# Patient Record
Sex: FEMALE | Race: WHITE | Employment: UNEMPLOYED | URBAN - METROPOLITAN AREA
[De-identification: names, ages, dates, MRNs, and addresses within clinical notes are randomized per-mention and may not be internally consistent; named-entity substitution may affect disease eponyms.]

---

## 2022-11-21 PROCEDURE — 96365 THER/PROPH/DIAG IV INF INIT: CPT

## 2022-11-21 PROCEDURE — 99285 EMERGENCY DEPT VISIT HI MDM: CPT

## 2022-11-22 ENCOUNTER — HOSPITAL ENCOUNTER (INPATIENT)
Age: 52
LOS: 1 days | Discharge: LEFT BEFORE DISCHARGE | DRG: 380 | End: 2022-11-22
Attending: INTERNAL MEDICINE | Admitting: INTERNAL MEDICINE
Payer: COMMERCIAL

## 2022-11-22 ENCOUNTER — APPOINTMENT (OUTPATIENT)
Dept: GENERAL RADIOLOGY | Age: 52
DRG: 380 | End: 2022-11-22
Payer: COMMERCIAL

## 2022-11-22 ENCOUNTER — APPOINTMENT (OUTPATIENT)
Dept: CT IMAGING | Age: 52
DRG: 380 | End: 2022-11-22
Payer: COMMERCIAL

## 2022-11-22 ENCOUNTER — HOSPITAL ENCOUNTER (EMERGENCY)
Age: 52
Discharge: ANOTHER HOSPITAL | DRG: 380 | End: 2022-11-22
Attending: EMERGENCY MEDICINE
Payer: COMMERCIAL

## 2022-11-22 ENCOUNTER — HOSPITAL ENCOUNTER (INPATIENT)
Age: 52
LOS: 1 days | Discharge: HOME | DRG: 380 | End: 2022-11-23
Attending: INTERNAL MEDICINE | Admitting: INTERNAL MEDICINE
Payer: COMMERCIAL

## 2022-11-22 VITALS
HEIGHT: 63 IN | TEMPERATURE: 98 F | SYSTOLIC BLOOD PRESSURE: 139 MMHG | RESPIRATION RATE: 18 BRPM | DIASTOLIC BLOOD PRESSURE: 66 MMHG | WEIGHT: 198 LBS | BODY MASS INDEX: 35.08 KG/M2 | HEART RATE: 75 BPM | OXYGEN SATURATION: 90 %

## 2022-11-22 VITALS
RESPIRATION RATE: 18 BRPM | DIASTOLIC BLOOD PRESSURE: 70 MMHG | TEMPERATURE: 98.5 F | OXYGEN SATURATION: 92 % | HEART RATE: 94 BPM | SYSTOLIC BLOOD PRESSURE: 168 MMHG

## 2022-11-22 DIAGNOSIS — L03.319 CELLULITIS OF TRUNK, UNSPECIFIED SITE OF TRUNK: ICD-10-CM

## 2022-11-22 DIAGNOSIS — L89.319 PRESSURE INJURY OF SKIN OF RIGHT BUTTOCK, UNSPECIFIED INJURY STAGE: Primary | ICD-10-CM

## 2022-11-22 DIAGNOSIS — L89.329 PRESSURE INJURY OF SKIN OF LEFT BUTTOCK, UNSPECIFIED INJURY STAGE: Primary | ICD-10-CM

## 2022-11-22 PROBLEM — L03.317 CELLULITIS AND ABSCESS OF BUTTOCK: Status: ACTIVE | Noted: 2022-11-22

## 2022-11-22 PROBLEM — L03.317 CELLULITIS OF BUTTOCK: Status: ACTIVE | Noted: 2022-11-22

## 2022-11-22 PROBLEM — L02.31 CELLULITIS AND ABSCESS OF BUTTOCK: Status: ACTIVE | Noted: 2022-11-22

## 2022-11-22 PROBLEM — L89.309 DECUBITUS ULCER, BUTTOCK: Status: ACTIVE | Noted: 2022-11-22

## 2022-11-22 LAB
A/G RATIO: 0.9 (ref 1.1–2.2)
A/G RATIO: 0.9 (ref 1.1–2.2)
ALBUMIN SERPL-MCNC: 3.3 G/DL (ref 3.4–5)
ALBUMIN SERPL-MCNC: 3.7 G/DL (ref 3.4–5)
ALP BLD-CCNC: 119 U/L (ref 40–129)
ALP BLD-CCNC: 97 U/L (ref 40–129)
ALT SERPL-CCNC: 12 U/L (ref 10–40)
ALT SERPL-CCNC: 8 U/L (ref 10–40)
ANION GAP SERPL CALCULATED.3IONS-SCNC: 7 MMOL/L (ref 3–16)
ANION GAP SERPL CALCULATED.3IONS-SCNC: 8 MMOL/L (ref 3–16)
AST SERPL-CCNC: 15 U/L (ref 15–37)
AST SERPL-CCNC: 16 U/L (ref 15–37)
BACTERIA: ABNORMAL /HPF
BASOPHILS ABSOLUTE: 0 K/UL (ref 0–0.2)
BASOPHILS ABSOLUTE: 0.1 K/UL (ref 0–0.2)
BASOPHILS RELATIVE PERCENT: 0.3 %
BASOPHILS RELATIVE PERCENT: 1.3 %
BILIRUB SERPL-MCNC: 0.8 MG/DL (ref 0–1)
BILIRUB SERPL-MCNC: 1 MG/DL (ref 0–1)
BILIRUBIN URINE: NEGATIVE
BLOOD, URINE: NEGATIVE
BUN BLDV-MCNC: 18 MG/DL (ref 7–20)
BUN BLDV-MCNC: 23 MG/DL (ref 7–20)
CALCIUM SERPL-MCNC: 9 MG/DL (ref 8.3–10.6)
CALCIUM SERPL-MCNC: 9.4 MG/DL (ref 8.3–10.6)
CHLORIDE BLD-SCNC: 94 MMOL/L (ref 99–110)
CHLORIDE BLD-SCNC: 97 MMOL/L (ref 99–110)
CLARITY: CLEAR
CO2: 33 MMOL/L (ref 21–32)
CO2: 36 MMOL/L (ref 21–32)
COLOR: YELLOW
CREAT SERPL-MCNC: 0.7 MG/DL (ref 0.6–1.1)
CREAT SERPL-MCNC: 0.8 MG/DL (ref 0.6–1.1)
EOSINOPHILS ABSOLUTE: 0.1 K/UL (ref 0–0.6)
EOSINOPHILS ABSOLUTE: 0.1 K/UL (ref 0–0.6)
EOSINOPHILS RELATIVE PERCENT: 1.7 %
EOSINOPHILS RELATIVE PERCENT: 1.8 %
EPITHELIAL CELLS, UA: ABNORMAL /HPF (ref 0–5)
GFR SERPL CREATININE-BSD FRML MDRD: >60 ML/MIN/{1.73_M2}
GFR SERPL CREATININE-BSD FRML MDRD: >60 ML/MIN/{1.73_M2}
GLUCOSE BLD-MCNC: 106 MG/DL (ref 70–99)
GLUCOSE BLD-MCNC: 115 MG/DL (ref 70–99)
GLUCOSE BLD-MCNC: 211 MG/DL (ref 70–99)
GLUCOSE BLD-MCNC: 216 MG/DL (ref 70–99)
GLUCOSE BLD-MCNC: 94 MG/DL (ref 70–99)
GLUCOSE URINE: NEGATIVE MG/DL
HCT VFR BLD CALC: 56.3 % (ref 36–48)
HCT VFR BLD CALC: 56.9 % (ref 36–48)
HEMOGLOBIN: 15.8 G/DL (ref 12–16)
HEMOGLOBIN: 16.6 G/DL (ref 12–16)
KETONES, URINE: NEGATIVE MG/DL
LEUKOCYTE ESTERASE, URINE: NEGATIVE
LIPASE: 9 U/L (ref 13–60)
LYMPHOCYTES ABSOLUTE: 0.6 K/UL (ref 1–5.1)
LYMPHOCYTES ABSOLUTE: 0.7 K/UL (ref 1–5.1)
LYMPHOCYTES RELATIVE PERCENT: 10.6 %
LYMPHOCYTES RELATIVE PERCENT: 12.6 %
MCH RBC QN AUTO: 20.9 PG (ref 26–34)
MCH RBC QN AUTO: 21.2 PG (ref 26–34)
MCHC RBC AUTO-ENTMCNC: 28.1 G/DL (ref 31–36)
MCHC RBC AUTO-ENTMCNC: 29.2 G/DL (ref 31–36)
MCV RBC AUTO: 72.7 FL (ref 80–100)
MCV RBC AUTO: 74.4 FL (ref 80–100)
MICROSCOPIC EXAMINATION: YES
MONOCYTES ABSOLUTE: 0.4 K/UL (ref 0–1.3)
MONOCYTES ABSOLUTE: 0.6 K/UL (ref 0–1.3)
MONOCYTES RELATIVE PERCENT: 6.6 %
MONOCYTES RELATIVE PERCENT: 9.6 %
NEUTROPHILS ABSOLUTE: 4.4 K/UL (ref 1.7–7.7)
NEUTROPHILS ABSOLUTE: 4.9 K/UL (ref 1.7–7.7)
NEUTROPHILS RELATIVE PERCENT: 74.7 %
NEUTROPHILS RELATIVE PERCENT: 80.8 %
NITRITE, URINE: NEGATIVE
PDW BLD-RTO: 22.2 % (ref 12.4–15.4)
PDW BLD-RTO: 22.2 % (ref 12.4–15.4)
PERFORMED ON: ABNORMAL
PERFORMED ON: ABNORMAL
PERFORMED ON: NORMAL
PH UA: 7.5 (ref 5–8)
PLATELET # BLD: 159 K/UL (ref 135–450)
PLATELET # BLD: 163 K/UL (ref 135–450)
PMV BLD AUTO: 8.2 FL (ref 5–10.5)
PMV BLD AUTO: 8.4 FL (ref 5–10.5)
POTASSIUM REFLEX MAGNESIUM: 4.4 MMOL/L (ref 3.5–5.1)
POTASSIUM REFLEX MAGNESIUM: 5 MMOL/L (ref 3.5–5.1)
PROTEIN UA: 30 MG/DL
RBC # BLD: 7.56 M/UL (ref 4–5.2)
RBC # BLD: 7.84 M/UL (ref 4–5.2)
RBC UA: ABNORMAL /HPF (ref 0–4)
SODIUM BLD-SCNC: 137 MMOL/L (ref 136–145)
SODIUM BLD-SCNC: 138 MMOL/L (ref 136–145)
SPECIFIC GRAVITY UA: 1.01 (ref 1–1.03)
TOTAL PROTEIN: 7.1 G/DL (ref 6.4–8.2)
TOTAL PROTEIN: 7.8 G/DL (ref 6.4–8.2)
URINE REFLEX TO CULTURE: ABNORMAL
URINE TYPE: ABNORMAL
UROBILINOGEN, URINE: 0.2 E.U./DL
WBC # BLD: 5.9 K/UL (ref 4–11)
WBC # BLD: 6.1 K/UL (ref 4–11)
WBC UA: ABNORMAL /HPF (ref 0–5)

## 2022-11-22 PROCEDURE — G0378 HOSPITAL OBSERVATION PER HR: HCPCS

## 2022-11-22 PROCEDURE — 2060000000 HC ICU INTERMEDIATE R&B

## 2022-11-22 PROCEDURE — 72193 CT PELVIS W/DYE: CPT

## 2022-11-22 PROCEDURE — 2580000003 HC RX 258: Performed by: PHYSICIAN ASSISTANT

## 2022-11-22 PROCEDURE — APPSS30 APP SPLIT SHARED TIME 16-30 MINUTES: Performed by: CLINICAL NURSE SPECIALIST

## 2022-11-22 PROCEDURE — 6360000004 HC RX CONTRAST MEDICATION: Performed by: EMERGENCY MEDICINE

## 2022-11-22 PROCEDURE — 81001 URINALYSIS AUTO W/SCOPE: CPT

## 2022-11-22 PROCEDURE — 87070 CULTURE OTHR SPECIMN AEROBIC: CPT

## 2022-11-22 PROCEDURE — 6360000002 HC RX W HCPCS: Performed by: PHYSICIAN ASSISTANT

## 2022-11-22 PROCEDURE — 6370000000 HC RX 637 (ALT 250 FOR IP): Performed by: INTERNAL MEDICINE

## 2022-11-22 PROCEDURE — G0379 DIRECT REFER HOSPITAL OBSERV: HCPCS

## 2022-11-22 PROCEDURE — 87186 SC STD MICRODIL/AGAR DIL: CPT

## 2022-11-22 PROCEDURE — 71045 X-RAY EXAM CHEST 1 VIEW: CPT

## 2022-11-22 PROCEDURE — 87205 SMEAR GRAM STAIN: CPT

## 2022-11-22 PROCEDURE — 0JB90ZZ EXCISION OF BUTTOCK SUBCUTANEOUS TISSUE AND FASCIA, OPEN APPROACH: ICD-10-PCS | Performed by: INTERNAL MEDICINE

## 2022-11-22 PROCEDURE — 99285 EMERGENCY DEPT VISIT HI MDM: CPT

## 2022-11-22 PROCEDURE — 87077 CULTURE AEROBIC IDENTIFY: CPT

## 2022-11-22 PROCEDURE — 1200000000 HC SEMI PRIVATE

## 2022-11-22 PROCEDURE — 2580000003 HC RX 258: Performed by: INTERNAL MEDICINE

## 2022-11-22 PROCEDURE — 36415 COLL VENOUS BLD VENIPUNCTURE: CPT

## 2022-11-22 PROCEDURE — 94761 N-INVAS EAR/PLS OXIMETRY MLT: CPT

## 2022-11-22 PROCEDURE — 80053 COMPREHEN METABOLIC PANEL: CPT

## 2022-11-22 PROCEDURE — 6360000002 HC RX W HCPCS: Performed by: EMERGENCY MEDICINE

## 2022-11-22 PROCEDURE — 96365 THER/PROPH/DIAG IV INF INIT: CPT

## 2022-11-22 PROCEDURE — 6360000002 HC RX W HCPCS: Performed by: INTERNAL MEDICINE

## 2022-11-22 PROCEDURE — 85025 COMPLETE CBC W/AUTO DIFF WBC: CPT

## 2022-11-22 PROCEDURE — 83690 ASSAY OF LIPASE: CPT

## 2022-11-22 PROCEDURE — 99254 IP/OBS CNSLTJ NEW/EST MOD 60: CPT | Performed by: SURGERY

## 2022-11-22 PROCEDURE — 94640 AIRWAY INHALATION TREATMENT: CPT

## 2022-11-22 PROCEDURE — 2580000003 HC RX 258: Performed by: EMERGENCY MEDICINE

## 2022-11-22 PROCEDURE — APPNB60 APP NON BILLABLE TIME 46-60 MINS: Performed by: CLINICAL NURSE SPECIALIST

## 2022-11-22 PROCEDURE — 2700000000 HC OXYGEN THERAPY PER DAY

## 2022-11-22 RX ORDER — INSULIN GLARGINE 100 [IU]/ML
20 INJECTION, SOLUTION SUBCUTANEOUS
COMMUNITY
Start: 2021-03-20

## 2022-11-22 RX ORDER — LEVOTHYROXINE SODIUM 0.03 MG/1
25 TABLET ORAL
Status: DISCONTINUED | OUTPATIENT
Start: 2022-11-22 | End: 2022-11-22 | Stop reason: HOSPADM

## 2022-11-22 RX ORDER — ATORVASTATIN CALCIUM 40 MG/1
40 TABLET, FILM COATED ORAL NIGHTLY
Status: DISCONTINUED | OUTPATIENT
Start: 2022-11-22 | End: 2022-11-23 | Stop reason: HOSPADM

## 2022-11-22 RX ORDER — SODIUM CHLORIDE 9 MG/ML
INJECTION, SOLUTION INTRAVENOUS PRN
Status: DISCONTINUED | OUTPATIENT
Start: 2022-11-22 | End: 2022-11-22 | Stop reason: HOSPADM

## 2022-11-22 RX ORDER — TORSEMIDE 20 MG/1
20 TABLET ORAL DAILY
Status: DISCONTINUED | OUTPATIENT
Start: 2022-11-22 | End: 2022-11-22 | Stop reason: HOSPADM

## 2022-11-22 RX ORDER — LEVOTHYROXINE SODIUM 0.03 MG/1
25 TABLET ORAL
Status: DISCONTINUED | OUTPATIENT
Start: 2022-11-23 | End: 2022-11-23 | Stop reason: HOSPADM

## 2022-11-22 RX ORDER — SODIUM CHLORIDE 0.9 % (FLUSH) 0.9 %
5-40 SYRINGE (ML) INJECTION PRN
Status: DISCONTINUED | OUTPATIENT
Start: 2022-11-22 | End: 2022-11-22 | Stop reason: HOSPADM

## 2022-11-22 RX ORDER — POLYETHYLENE GLYCOL 3350 17 G/17G
17 POWDER, FOR SOLUTION ORAL DAILY PRN
Status: DISCONTINUED | OUTPATIENT
Start: 2022-11-22 | End: 2022-11-22 | Stop reason: HOSPADM

## 2022-11-22 RX ORDER — ONDANSETRON 2 MG/ML
4 INJECTION INTRAMUSCULAR; INTRAVENOUS EVERY 6 HOURS PRN
Status: DISCONTINUED | OUTPATIENT
Start: 2022-11-22 | End: 2022-11-23 | Stop reason: HOSPADM

## 2022-11-22 RX ORDER — CARVEDILOL 6.25 MG/1
6.25 TABLET ORAL 2 TIMES DAILY WITH MEALS
Status: DISCONTINUED | OUTPATIENT
Start: 2022-11-22 | End: 2022-11-22 | Stop reason: HOSPADM

## 2022-11-22 RX ORDER — ALBUTEROL SULFATE 90 UG/1
1 AEROSOL, METERED RESPIRATORY (INHALATION) EVERY 4 HOURS PRN
Status: DISCONTINUED | OUTPATIENT
Start: 2022-11-22 | End: 2022-11-22 | Stop reason: HOSPADM

## 2022-11-22 RX ORDER — CARVEDILOL 6.25 MG/1
12.5 TABLET ORAL 2 TIMES DAILY WITH MEALS
Status: DISCONTINUED | OUTPATIENT
Start: 2022-11-22 | End: 2022-11-22

## 2022-11-22 RX ORDER — ENOXAPARIN SODIUM 100 MG/ML
40 INJECTION SUBCUTANEOUS DAILY
Status: DISCONTINUED | OUTPATIENT
Start: 2022-11-23 | End: 2022-11-23 | Stop reason: HOSPADM

## 2022-11-22 RX ORDER — LACTOBACILLUS RHAMNOSUS GG 10B CELL
1 CAPSULE ORAL
Status: DISCONTINUED | OUTPATIENT
Start: 2022-11-23 | End: 2022-11-23 | Stop reason: HOSPADM

## 2022-11-22 RX ORDER — SODIUM CHLORIDE 0.9 % (FLUSH) 0.9 %
5-40 SYRINGE (ML) INJECTION EVERY 12 HOURS SCHEDULED
Status: DISCONTINUED | OUTPATIENT
Start: 2022-11-22 | End: 2022-11-23 | Stop reason: HOSPADM

## 2022-11-22 RX ORDER — TORSEMIDE 20 MG/1
1 TABLET ORAL DAILY
COMMUNITY
Start: 2022-10-31

## 2022-11-22 RX ORDER — DULAGLUTIDE 0.75 MG/.5ML
0.75 INJECTION, SOLUTION SUBCUTANEOUS WEEKLY
COMMUNITY

## 2022-11-22 RX ORDER — ENOXAPARIN SODIUM 100 MG/ML
40 INJECTION SUBCUTANEOUS DAILY
Status: DISCONTINUED | OUTPATIENT
Start: 2022-11-22 | End: 2022-11-22 | Stop reason: HOSPADM

## 2022-11-22 RX ORDER — INSULIN GLARGINE 100 [IU]/ML
20 INJECTION, SOLUTION SUBCUTANEOUS DAILY
Status: DISCONTINUED | OUTPATIENT
Start: 2022-11-23 | End: 2022-11-23 | Stop reason: HOSPADM

## 2022-11-22 RX ORDER — INSULIN LISPRO 100 [IU]/ML
0-8 INJECTION, SOLUTION INTRAVENOUS; SUBCUTANEOUS
Status: DISCONTINUED | OUTPATIENT
Start: 2022-11-23 | End: 2022-11-23

## 2022-11-22 RX ORDER — ACETAMINOPHEN 325 MG/1
650 TABLET ORAL EVERY 6 HOURS PRN
Status: DISCONTINUED | OUTPATIENT
Start: 2022-11-22 | End: 2022-11-23 | Stop reason: HOSPADM

## 2022-11-22 RX ORDER — INSULIN LISPRO 100 [IU]/ML
7 INJECTION, SOLUTION INTRAVENOUS; SUBCUTANEOUS
COMMUNITY
Start: 2022-06-15

## 2022-11-22 RX ORDER — LORATADINE 10 MG/1
1 TABLET ORAL DAILY PRN
COMMUNITY
Start: 2022-07-01

## 2022-11-22 RX ORDER — INSULIN LISPRO 100 [IU]/ML
0-4 INJECTION, SOLUTION INTRAVENOUS; SUBCUTANEOUS NIGHTLY
Status: DISCONTINUED | OUTPATIENT
Start: 2022-11-22 | End: 2022-11-23

## 2022-11-22 RX ORDER — METHADONE HYDROCHLORIDE 10 MG/1
70 TABLET ORAL DAILY
Status: DISCONTINUED | OUTPATIENT
Start: 2022-11-22 | End: 2022-11-22

## 2022-11-22 RX ORDER — ASPIRIN 81 MG/1
81 TABLET ORAL DAILY
Status: DISCONTINUED | OUTPATIENT
Start: 2022-11-22 | End: 2022-11-22 | Stop reason: HOSPADM

## 2022-11-22 RX ORDER — ACETAMINOPHEN 650 MG/1
650 SUPPOSITORY RECTAL EVERY 6 HOURS PRN
Status: DISCONTINUED | OUTPATIENT
Start: 2022-11-22 | End: 2022-11-23 | Stop reason: HOSPADM

## 2022-11-22 RX ORDER — OXYCODONE HYDROCHLORIDE 5 MG/1
5 TABLET ORAL EVERY 4 HOURS PRN
Status: DISCONTINUED | OUTPATIENT
Start: 2022-11-22 | End: 2022-11-22 | Stop reason: HOSPADM

## 2022-11-22 RX ORDER — OLMESARTAN MEDOXOMIL 40 MG/1
1 TABLET ORAL DAILY
COMMUNITY
Start: 2022-10-31

## 2022-11-22 RX ORDER — SODIUM CHLORIDE 0.9 % (FLUSH) 0.9 %
5-40 SYRINGE (ML) INJECTION PRN
Status: DISCONTINUED | OUTPATIENT
Start: 2022-11-22 | End: 2022-11-23 | Stop reason: HOSPADM

## 2022-11-22 RX ORDER — LOSARTAN POTASSIUM 100 MG/1
100 TABLET ORAL DAILY
Status: DISCONTINUED | OUTPATIENT
Start: 2022-11-23 | End: 2022-11-23 | Stop reason: HOSPADM

## 2022-11-22 RX ORDER — POLYETHYLENE GLYCOL 3350 17 G/17G
17 POWDER, FOR SOLUTION ORAL DAILY PRN
Status: DISCONTINUED | OUTPATIENT
Start: 2022-11-22 | End: 2022-11-23 | Stop reason: HOSPADM

## 2022-11-22 RX ORDER — ALBUTEROL SULFATE 90 UG/1
1 AEROSOL, METERED RESPIRATORY (INHALATION) EVERY 4 HOURS PRN
Status: DISCONTINUED | OUTPATIENT
Start: 2022-11-22 | End: 2022-11-23 | Stop reason: HOSPADM

## 2022-11-22 RX ORDER — ONDANSETRON 4 MG/1
4 TABLET, ORALLY DISINTEGRATING ORAL EVERY 8 HOURS PRN
Status: DISCONTINUED | OUTPATIENT
Start: 2022-11-22 | End: 2022-11-23 | Stop reason: HOSPADM

## 2022-11-22 RX ORDER — INSULIN LISPRO 100 [IU]/ML
0-4 INJECTION, SOLUTION INTRAVENOUS; SUBCUTANEOUS NIGHTLY
Status: DISCONTINUED | OUTPATIENT
Start: 2022-11-22 | End: 2022-11-22 | Stop reason: HOSPADM

## 2022-11-22 RX ORDER — ATORVASTATIN CALCIUM 40 MG/1
1 TABLET, FILM COATED ORAL NIGHTLY
COMMUNITY
Start: 2022-08-26

## 2022-11-22 RX ORDER — METHADONE HYDROCHLORIDE 10 MG/1
40 TABLET ORAL DAILY
Status: DISCONTINUED | OUTPATIENT
Start: 2022-11-22 | End: 2022-11-22 | Stop reason: HOSPADM

## 2022-11-22 RX ORDER — ACETAMINOPHEN 650 MG/1
650 SUPPOSITORY RECTAL EVERY 6 HOURS PRN
Status: DISCONTINUED | OUTPATIENT
Start: 2022-11-22 | End: 2022-11-22 | Stop reason: HOSPADM

## 2022-11-22 RX ORDER — PROCHLORPERAZINE EDISYLATE 5 MG/ML
10 INJECTION INTRAMUSCULAR; INTRAVENOUS EVERY 6 HOURS PRN
Status: DISCONTINUED | OUTPATIENT
Start: 2022-11-22 | End: 2022-11-22 | Stop reason: HOSPADM

## 2022-11-22 RX ORDER — SODIUM CHLORIDE 9 MG/ML
INJECTION, SOLUTION INTRAVENOUS PRN
Status: DISCONTINUED | OUTPATIENT
Start: 2022-11-22 | End: 2022-11-23 | Stop reason: HOSPADM

## 2022-11-22 RX ORDER — CARVEDILOL 6.25 MG/1
2 TABLET ORAL 2 TIMES DAILY WITH MEALS
COMMUNITY
Start: 2022-01-18

## 2022-11-22 RX ORDER — INSULIN GLARGINE 100 [IU]/ML
20 INJECTION, SOLUTION SUBCUTANEOUS EVERY MORNING
Status: DISCONTINUED | OUTPATIENT
Start: 2022-11-22 | End: 2022-11-22 | Stop reason: HOSPADM

## 2022-11-22 RX ORDER — LOSARTAN POTASSIUM 100 MG/1
100 TABLET ORAL DAILY
Status: DISCONTINUED | OUTPATIENT
Start: 2022-11-22 | End: 2022-11-22 | Stop reason: HOSPADM

## 2022-11-22 RX ORDER — LEVOTHYROXINE SODIUM 0.03 MG/1
1 TABLET ORAL
COMMUNITY
Start: 2022-10-17

## 2022-11-22 RX ORDER — INSULIN LISPRO 100 [IU]/ML
7 INJECTION, SOLUTION INTRAVENOUS; SUBCUTANEOUS
Status: DISCONTINUED | OUTPATIENT
Start: 2022-11-22 | End: 2022-11-22

## 2022-11-22 RX ORDER — HYDRALAZINE HYDROCHLORIDE 20 MG/ML
5 INJECTION INTRAMUSCULAR; INTRAVENOUS EVERY 4 HOURS PRN
Status: DISCONTINUED | OUTPATIENT
Start: 2022-11-22 | End: 2022-11-22 | Stop reason: HOSPADM

## 2022-11-22 RX ORDER — SODIUM CHLORIDE 0.9 % (FLUSH) 0.9 %
5-40 SYRINGE (ML) INJECTION EVERY 12 HOURS SCHEDULED
Status: DISCONTINUED | OUTPATIENT
Start: 2022-11-22 | End: 2022-11-22 | Stop reason: HOSPADM

## 2022-11-22 RX ORDER — CARVEDILOL 12.5 MG/1
12.5 TABLET ORAL 2 TIMES DAILY WITH MEALS
Status: DISCONTINUED | OUTPATIENT
Start: 2022-11-23 | End: 2022-11-23 | Stop reason: HOSPADM

## 2022-11-22 RX ORDER — ATORVASTATIN CALCIUM 40 MG/1
40 TABLET, FILM COATED ORAL NIGHTLY
Status: DISCONTINUED | OUTPATIENT
Start: 2022-11-22 | End: 2022-11-22 | Stop reason: HOSPADM

## 2022-11-22 RX ORDER — TORSEMIDE 20 MG/1
20 TABLET ORAL DAILY
Status: DISCONTINUED | OUTPATIENT
Start: 2022-11-23 | End: 2022-11-23 | Stop reason: HOSPADM

## 2022-11-22 RX ORDER — ALBUTEROL SULFATE 90 UG/1
1 AEROSOL, METERED RESPIRATORY (INHALATION) EVERY 4 HOURS PRN
COMMUNITY
Start: 2017-12-07

## 2022-11-22 RX ORDER — DEXTROSE MONOHYDRATE 100 MG/ML
INJECTION, SOLUTION INTRAVENOUS CONTINUOUS PRN
Status: DISCONTINUED | OUTPATIENT
Start: 2022-11-22 | End: 2022-11-23 | Stop reason: HOSPADM

## 2022-11-22 RX ORDER — METHADONE HYDROCHLORIDE 10 MG/1
40 TABLET ORAL DAILY
Status: DISCONTINUED | OUTPATIENT
Start: 2022-11-23 | End: 2022-11-23 | Stop reason: HOSPADM

## 2022-11-22 RX ORDER — INSULIN LISPRO 100 [IU]/ML
0-8 INJECTION, SOLUTION INTRAVENOUS; SUBCUTANEOUS
Status: DISCONTINUED | OUTPATIENT
Start: 2022-11-22 | End: 2022-11-22 | Stop reason: HOSPADM

## 2022-11-22 RX ORDER — DEXTROSE MONOHYDRATE 100 MG/ML
INJECTION, SOLUTION INTRAVENOUS CONTINUOUS PRN
Status: DISCONTINUED | OUTPATIENT
Start: 2022-11-22 | End: 2022-11-22 | Stop reason: HOSPADM

## 2022-11-22 RX ORDER — ACETAMINOPHEN 325 MG/1
650 TABLET ORAL EVERY 6 HOURS PRN
Status: DISCONTINUED | OUTPATIENT
Start: 2022-11-22 | End: 2022-11-22 | Stop reason: HOSPADM

## 2022-11-22 RX ADMIN — LEVOTHYROXINE SODIUM 25 MCG: 0.03 TABLET ORAL at 10:12

## 2022-11-22 RX ADMIN — Medication 10 ML: at 10:27

## 2022-11-22 RX ADMIN — Medication 2 PUFF: at 08:19

## 2022-11-22 RX ADMIN — IOPAMIDOL 100 ML: 755 INJECTION, SOLUTION INTRAVENOUS at 02:36

## 2022-11-22 RX ADMIN — CEFEPIME 2000 MG: 2 INJECTION, POWDER, FOR SOLUTION INTRAVENOUS at 19:30

## 2022-11-22 RX ADMIN — ASPIRIN 81 MG: 81 TABLET, COATED ORAL at 10:11

## 2022-11-22 RX ADMIN — VANCOMYCIN HYDROCHLORIDE 1000 MG: 1 INJECTION, POWDER, LYOPHILIZED, FOR SOLUTION INTRAVENOUS at 10:24

## 2022-11-22 RX ADMIN — TIOTROPIUM BROMIDE AND OLODATEROL 1 PUFF: 3.124; 2.736 SPRAY, METERED RESPIRATORY (INHALATION) at 08:20

## 2022-11-22 RX ADMIN — METHADONE HYDROCHLORIDE 40 MG: 10 TABLET ORAL at 10:12

## 2022-11-22 RX ADMIN — LOSARTAN POTASSIUM 100 MG: 100 TABLET, FILM COATED ORAL at 10:12

## 2022-11-22 RX ADMIN — CARVEDILOL 6.25 MG: 6.25 TABLET, FILM COATED ORAL at 10:11

## 2022-11-22 RX ADMIN — CEFEPIME 2000 MG: 1 INJECTION, POWDER, FOR SOLUTION INTRAMUSCULAR; INTRAVENOUS at 04:56

## 2022-11-22 RX ADMIN — INSULIN GLARGINE 20 UNITS: 100 INJECTION, SOLUTION SUBCUTANEOUS at 10:15

## 2022-11-22 RX ADMIN — TORSEMIDE 20 MG: 20 TABLET ORAL at 10:12

## 2022-11-22 ASSESSMENT — ENCOUNTER SYMPTOMS
ABDOMINAL PAIN: 0
NAUSEA: 0
VOMITING: 0
BACK PAIN: 0
SORE THROAT: 0
COUGH: 0
EYE PAIN: 0
VOMITING: 0
ABDOMINAL PAIN: 0
CONSTIPATION: 0
DIARRHEA: 0
NAUSEA: 0
SHORTNESS OF BREATH: 0

## 2022-11-22 ASSESSMENT — PAIN DESCRIPTION - LOCATION: LOCATION: BUTTOCKS

## 2022-11-22 ASSESSMENT — PAIN SCALES - GENERAL
PAINLEVEL_OUTOF10: 8
PAINLEVEL_OUTOF10: 4

## 2022-11-22 ASSESSMENT — PAIN - FUNCTIONAL ASSESSMENT: PAIN_FUNCTIONAL_ASSESSMENT: 0-10

## 2022-11-22 NOTE — ED PROVIDER NOTES
[id number] [facility]  [facility]      Pt Name: Lupis Jones  MRN: [id number]  Armsyogigfandres 1970  Date ofevaluation: 11/21/2022  Provider: Aldo Maravilla MD    CHIEF COMPLAINT       Chief Complaint   Patient presents with    Abscess     On buttocks x2 weeks, draining, foul smell. HISTORY OF PRESENT ILLNESS   (Location/Symptom, Timing/Onset,Context/Setting, Quality, Duration, Modifying Factors, Severity)  Note limiting factors. Lupis Jones is a 45 y.o. female  who  has a past medical history of Anemia, Asthma, Cerebral artery occlusion with cerebral infarction ([address] CHF (congestive heart failure) ([address] COPD (chronic obstructive pulmonary disease) ([address] Diabetes ([address] Edema, Foot ulcer ([address] HTN (hypertension), benign, and Hyperlipidemia. who presents to the emergency department for evaluation of wound to the buttocks. Patient reports having an abscess to her buttocks which began draining a few days previously. She states that is persistently draining and is foul-smelling and wanted to be evaluated for concerns of infection. She denies fevers or changes in bowel or urine function. Denies a history of previous abscesses to the buttocks. On evaluation patient is an area of large ulceration with surrounding erythema and induration. Patient reports that she had not seen the area of abscess in the past few days. She reports that it has increased significantly is now a large ulceration when previously it appeared to be a small abscess with a head on it. Patient has a call to her PCP from 11/14 that states that at the time the abscess was a size of a quarter. HPI    NursingNotes were reviewed. REVIEW OF SYSTEMS    (2-9 systems for level 4, 10 or more for level 5)     Review of Systems   Constitutional:  Negative for fever. Gastrointestinal:  Negative for abdominal pain, nausea and vomiting. Skin:  Positive for wound.      Except as noted above the remainder of the review of systems was reviewed and negative. PAST MEDICAL HISTORY     Past Medical History:   Diagnosis Date    Anemia     Asthma     Cerebral artery occlusion with cerebral infarction (HCC)     CHF (congestive heart failure) (HCC)     COPD (chronic obstructive pulmonary disease) [facility]     Diabetes ([facility]     Edema     Foot ulcer ([facility]     HTN (hypertension), benign 11/19/2013    Hyperlipidemia          SURGICALHISTORY       Past Surgical History:   Procedure Laterality Date    HERNIA REPAIR      TUBAL LIGATION           CURRENT MEDICATIONS       Previous Medications    ACETAMINOPHEN (APAP EXTRA STRENGTH) 500 MG TABLET    Take 2 tablets by mouth every 6 hours as needed for Pain DO NOT TAKE WITH OTHER MEDICATIONS CONTAINING ACETAMINOPHEN. ASPIRIN 81 MG EC TABLET    Take 1 tablet by mouth daily for 30 days. BUDESONIDE-FORMOTEROL (SYMBICORT) 160-4.5 MCG/ACT AERO    Inhale 2 puffs into the lungs 2 times daily    IBUPROFEN (ADVIL;MOTRIN) 600 MG TABLET    Take 1 tablet by mouth every 6 hours as needed for Pain    IBUPROFEN (ADVIL;MOTRIN) 600 MG TABLET    Take 1 tablet by mouth every 6 hours as needed for Pain    INSULIN REGULAR (HUMULIN R;NOVOLIN R) 100 UNIT/ML INJECTION    Inject 15 Units into the skin See Admin Instructions Takes 15 units in the AM then 5 units before each meal  Weaning as pt loses weight    METHADONE (DOLOPHINE) 10 MG TABLET    Take 70 mg by mouth daily.             Codeine and Pcn [penicillins]    FAMILY HISTORY       Family History   Problem Relation Age of Onset    COPD Mother     Early Death Brother     Cancer Maternal Aunt     Cancer Maternal Grandmother           SOCIAL HISTORY       Social History     Socioeconomic History    Marital status: Single     Spouse name: None    Number of children: None    Years of education: None    Highest education level: None   Tobacco Use    Smoking status: Former     Packs/day: 1.00     Years: 25.00     Pack years: 25.00 Types: Cigarettes    Smokeless tobacco: Never    Tobacco comments:     quit 3 weeks ago   Substance and Sexual Activity    Alcohol use: No    Drug use: No    Sexual activity: Never     Partners: Male       SCREENINGS    Kamaljit Coma Scale  Eye Opening: Spontaneous  Best Verbal Response: Oriented  Best Motor Response: Obeys commands  Kamaljit Coma Scale Score: 15        PHYSICAL EXAM    (up to 7 for level 4, 8 or more for level 5)     ED Triage Vitals   BP Temp Temp Source Heart Rate Resp SpO2 Height Weight   11/22/22 0015 11/22/22 0013 11/22/22 0013 11/22/22 0013 11/22/22 0013 11/22/22 0013 -- --   (!) 175/84 98.5 °F (36.9 °C) Oral 94 18 90 %         Physical Exam  Constitutional:       Appearance: She is well-developed. HENT:      Head: Normocephalic and atraumatic. Mouth/Throat:      Mouth: Mucous membranes are moist.   Eyes:      Extraocular Movements: Extraocular movements intact. Conjunctiva/sclera: Conjunctivae normal.      Pupils: Pupils are equal, round, and reactive to light. Neck:      Trachea: No tracheal deviation. Cardiovascular:      Rate and Rhythm: Normal rate and regular rhythm. Heart sounds: Normal heart sounds. Pulmonary:      Effort: Pulmonary effort is normal.      Breath sounds: Normal breath sounds. Abdominal:      General: There is no distension. Palpations: Abdomen is soft. Tenderness: There is no abdominal tenderness. Musculoskeletal:         General: Normal range of motion. Cervical back: Normal range of motion. Skin:     General: Skin is warm and dry. Capillary Refill: Capillary refill takes less than 2 seconds. Findings: Lesion present. Neurological:      Mental Status: She is alert.        RESULTS     EKG: All EKG's are interpreted by the Emergency Department Physician who either signs or Co-signsthis chart in the absence of a cardiologist.      RADIOLOGY:   Non-plain filmimages such as CT, Ultrasound and MRI are read by the petra Valenzuela radiographic images are visualized and preliminarily interpreted by the emergency physician with the below findings:      Interpretation per the Radiologist below, if available at the time ofthis note:    XR CHEST PORTABLE   Final Result   No acute airspace disease identified. CT PELVIS W CONTRAST Additional Contrast? None    (Results Pending)         ED BEDSIDE ULTRASOUND:   Performed by ED Physician - none    LABS:  Labs Reviewed   CBC WITH AUTO DIFFERENTIAL - Abnormal; Notable for the following components:       Result Value    RBC 7.84 (*)     Hemoglobin 16.6 (*)     Hematocrit 56.9 (*)     MCV 72.7 (*)     MCH 21.2 (*)     MCHC 29.2 (*)     RDW 22.2 (*)     Lymphocytes Absolute 0.6 (*)     All other components within normal limits   COMPREHENSIVE METABOLIC PANEL W/ REFLEX TO MG FOR LOW K - Abnormal; Notable for the following components:    Chloride 97 (*)     CO2 33 (*)     Glucose 115 (*)     BUN 23 (*)     Albumin/Globulin Ratio 0.9 (*)     ALT 8 (*)     All other components within normal limits   LIPASE - Abnormal; Notable for the following components:    Lipase 9.0 (*)     All other components within normal limits   URINALYSIS WITH REFLEX TO CULTURE - Abnormal; Notable for the following components:    Protein, UA 30 (*)     All other components within normal limits   MICROSCOPIC URINALYSIS - Abnormal; Notable for the following components:    Bacteria, UA Rare (*)     All other components within normal limits   CULTURE, WOUND       All other labs were within normal range or not returned as of this dictation.     EMERGENCY DEPARTMENT COURSE and DIFFERENTIAL DIAGNOSIS/MDM:   Vitals:    Vitals:    11/22/22 0013 11/22/22 0015   BP:  (!) 175/84   Pulse: 94    Resp: 18    Temp: 98.5 °F (36.9 °C)    TempSrc: Oral    SpO2: 90%        Patient was given thefollowing medications:  Medications - No data to display    ED COURSE & MEDICAL DECISION MAKING    Pertinent Labs & Imaging studies reviewed. (See chart for details)   -  Patient seen and evaluated in the emergency department. -  Triage and nursing notes reviewed and incorporated. -  Old chart records reviewed and incorporated. -  Differential diagnosis includes: Differential diagnosis: necrotizing fasciitis, deep space soft tissue bacterial skin infection, viral rash, systemic infectious rash, aseptic cyst, malignancy, other    -  Work-up included:  See above  -  ED treatment included: See above  -  Results discussed with patient. Patient resents ED for evaluation of reported abscess to her right gluteal cheek. On exam patient has a very large open abscess with appears to be a necrotic eschar. Do not appreciate any crepitus on exam.  Laboratory work-up and imaging obtained to rule out necrotizing infection. labs show no emergent laboratory normalities. Imaging studies show no signs of subcutaneous air or deep space infection. Patient started on broad-spectrum antibiotics. Wound cultures were obtained. Due to the progressive nature of the patient's symptoms as well as diffuse cellulitis discussed admission with the hospitalist who agreed to accept the patient. patient feels well on reevaluation. The patient is agreeable with plan of care and disposition. Is this patient to be included in the SEP-1 Core Measure due to severe sepsis or septic shock? No   Exclusion criteria - the patient is NOT to be included for SEP-1 Core Measure due to:  2+ SIRS criteria are not met      REASSESSMENT          CRITICAL CARE TIME   Total Critical Care time was 10 minutes, excluding separately reportable procedures. There was a high probability of clinically significant/life threatening deterioration in the patient's condition which required my urgent intervention. CONSULTS:  None    PROCEDURES:  Unless otherwise noted below, none     Procedures    FINAL IMPRESSION      1. Pressure injury of skin of left buttock, unspecified injury stage    2. Cellulitis of trunk, unspecified site of trunk          DISPOSITION/PLAN   DISPOSITION        PATIENT REFERREDTO:  No follow-up provider specified.     DISCHARGEMEDICATIONS:  New Prescriptions    No medications on file          (Please note that portions of this note were completed with a voice recognition program.  Efforts were made to edit the dictations but occasionally words are mis-transcribed.)    Abel Watts MD (electronically signed)  Attending Emergency Physician          Abel Watts MD  11/22/22 3630

## 2022-11-22 NOTE — PROGRESS NOTES
Followed up with Gely Foss on 11/22/2022 at 12:59 PM. Patient leaving with a disposition of AMA. Patient cited personal obligations and long distance as reason. Notified attending. AMA form printed and signed by rn and pt. Advised patient to follow up with a primary care physician or return to the Emergency Department if symptoms worsen.    Elizabeth Kirkpatrick RN

## 2022-11-22 NOTE — H&P
Hospital Medicine History & Physical      PCP: [facility]    Date of Admission: 11/22/2022    Date of Service: Pt seen/examined on 11/22/22 and Admitted to Inpatient with expected LOS greater than two midnights due to medical therapy. Chief Complaint:  R buttock ulcer    History Of Present Illness: The patient is a pleasant 45 Y F with a h/o COPD on 3LNC, HTN, HLD, DM2 with partial L foot amputation, CAD, CHF, and CVA. Starting about a month ago the patient noticed a boil on her R buttock. Over the next couple of weeks it gradually developed into a quarter-sized wound. The area became very red, tender, and firm. She used warm compresses and says that she was able to get lots of pus to drain. Over the last few days, however, the wound had rapidly increased in size. There is now black, malodorous tissue sloughing from the wound. Surrounded by erythema. She hasn't had any fevers/chills, and denies any malaise or lethargy. She went to the [facility] ED because of the rapid worsening of the wound and foul odor. She was transferred here overnight. Past Medical History:          Diagnosis Date    Anemia     Asthma     Cerebral artery occlusion with cerebral infarction (HCC)     CHF (congestive heart failure) (HCC)     COPD (chronic obstructive pulmonary disease) ([facility]     Diabetes ([facility]     Edema     Foot ulcer ([facility]     HTN (hypertension), benign 11/19/2013    Hyperlipidemia        Past Surgical History:          Procedure Laterality Date    HERNIA REPAIR      TUBAL LIGATION         Medications Prior to Admission:      Prior to Admission medications    Medication Sig Start Date End Date Taking?  Authorizing Provider   tiotropium-olodaterol (STIOLTO RESPIMAT) 2.5-2.5 MCG/ACT AERS Inhale 1 puff into the lungs in the morning and at bedtime 3/10/21  Yes Historical Provider, MD   olmesartan (BENICAR) 40 MG tablet Take 1 tablet by mouth daily 10/31/22  Yes Historical Provider, MD   torsemide (DEMADEX) 20 MG tablet Take 1 tablet by mouth daily 10/31/22  Yes Historical Provider, MD   levothyroxine (SYNTHROID) 25 MCG tablet Take 1 tablet by mouth every morning (before breakfast) 10/17/22  Yes Historical Provider, MD   albuterol sulfate HFA (PROVENTIL;VENTOLIN;PROAIR) 108 (90 Base) MCG/ACT inhaler Inhale 1 puff into the lungs every 4 hours as needed 12/7/17  Yes Historical Provider, MD   insulin glargine (LANTUS SOLOSTAR) 100 UNIT/ML injection pen Inject 20 Units into the skin nightly 3/20/21  Yes Historical Provider, MD   atorvastatin (LIPITOR) 40 MG tablet Take 1 tablet by mouth nightly 8/26/22  Yes Historical Provider, MD   loratadine (CLARITIN) 10 MG tablet Take 1 tablet by mouth daily as needed 7/1/22  Yes Historical Provider, MD   insulin lispro (HUMALOG) 100 UNIT/ML SOLN injection vial Inject 7 Units into the skin 3 times daily (before meals) 6/15/22  Yes Historical Provider, MD   carvedilol (COREG) 6.25 MG tablet Take 2 tablets by mouth 2 times daily (with meals) 1/18/22  Yes Historical Provider, MD   budesonide-formoterol (SYMBICORT) 160-4.5 MCG/ACT AERO Inhale 2 puffs into the lungs 2 times daily 9/7/15   Ursula Earl MD   methadone (DOLOPHINE) 10 MG tablet Take 70 mg by mouth daily. Historical Provider, MD       Allergies:  Codeine and Pcn [penicillins]    Social History:      The patient currently lives at home    TOBACCO:   reports that she has quit smoking. Her smoking use included cigarettes. She has a 25.00 pack-year smoking history. She has never used smokeless tobacco.  ETOH:   reports no history of alcohol use. E-cigarette/Vaping       Questions Responses    E-cigarette/Vaping Use     Start Date     Passive Exposure     Quit Date     Counseling Given     Comments               Family History:      Reviewed and negative in regards to presenting illness/complaint.         Problem Relation Age of Onset    COPD Mother     Early Death Brother     Cancer Maternal Aunt     Cancer Maternal Grandmother        REVIEW OF SYSTEMS COMPLETED:   Pertinent positives as noted in the HPI. All other systems reviewed and negative. PHYSICAL EXAM PERFORMED:    BP (!) 173/67   Pulse 73   Temp 98 °F (36.7 °C) (Oral)   Resp 18   Ht 5' 3\" (1.6 m)   Wt 198 lb (89.8 kg)   SpO2 90%   BMI 35.07 kg/m²     General appearance:  No apparent distress, appears stated age and cooperative. HEENT:  Normal cephalic, atraumatic without obvious deformity. Pupils equal, round, and reactive to light. Extra ocular muscles intact. Conjunctivae/corneas clear. Neck: Supple, with full range of motion. No jugular venous distention. Trachea midline. Respiratory:  Normal respiratory effort. Clear to auscultation, bilaterally without Rales/Wheezes/Rhonchi. Cardiovascular:  Regular rate and rhythm with normal S1/S2 without murmurs, rubs or gallops. Abdomen: Soft, non-tender, non-distended with normal bowel sounds. Musculoskeletal:  No clubbing, cyanosis. Tense, weeping, BLE pitting edema. Full range of motion. Partial L foot amputation. Skin: Skin color, texture, turgor normal.  R buttock has a necrotic, malodorous wound, perhaps about 6 cm, surrounded by erythema. Neurologic:  Neurovascularly intact without any focal sensory/motor deficits. Cranial nerves: II-XII intact, grossly non-focal.  Psychiatric:  Alert and oriented, thought content appropriate, normal insight  Capillary Refill: Brisk,3 seconds, normal  Peripheral Pulses: +2 palpable, equal bilaterally       Labs:     Recent Labs     11/22/22  0120   WBC 6.1   HGB 16.6*   HCT 56.9*        Recent Labs     11/22/22  0140      K 5.0   CL 97*   CO2 33*   BUN 23*   CREATININE 0.8   CALCIUM 9.4     Recent Labs     11/22/22  0140   AST 15   ALT 8*   BILITOT 1.0   ALKPHOS 119     No results for input(s): INR in the last 72 hours. No results for input(s): Aleene Sauger in the last 72 hours.     Urinalysis:      Lab Results   Component Value Date/Time NITRU Negative 11/22/2022 01:20 AM    WBCUA 0-2 11/22/2022 01:20 AM    BACTERIA Rare 11/22/2022 01:20 AM    RBCUA 0-2 11/22/2022 01:20 AM    BLOODU Negative 11/22/2022 01:20 AM    SPECGRAV 1.015 11/22/2022 01:20 AM    GLUCOSEU Negative 11/22/2022 01:20 AM    GLUCOSEU 250 06/24/2010 04:50 AM       Radiology:     CXR: I have reviewed the CXR with the following interpretation: clear lungs  EKG:  I have reviewed the EKG with the following interpretation: nonspecific abnormalities    No orders to display       Consults:    [address]:    CYNDEE/Jes Sandoval [address] Problems    Diagnosis Date Noted    Cellulitis of buttock [B32.649] 11/22/2022     Priority: Medium         PLAN:      Infected R buttock wound, in part due to uncontrolled diabetes. No abscess or gas on CT. No SIRS. Empiric vanc and cefepime. F/u wound culture. General surgery consulted. DM2. Recent A1c 10.4. I do question her compliance, continue her same insulin regimen for now and reassess. She is on weekly dulaglutide as outpatient. Despite her CHF we will avoid SGLT2i's given her h/o partial foot amputation. HTN, chronic diastolic CHF. Cardiomyopathy has improved over the years. Continue torsemide, ARB per formulary, carvedilol. Of note, the patient says that since starting her water pills back in 9/2022 she has lost over 60 lbs. She says she used to be extremely swollen. Her legs are still very edematous but we will stick with her current PO regimen and let this continue to play out as outpatient. Her lungs are clear on exam and CXR, and much of her edema is likely from venous insufficiency. Elevation, compression, and avoidance of sodium will be key. CAD, h/o CVA. Unclear why she isn't on aspirin, started this. Continue statin. COPD. Baseline is 3LNC. Inhaled bronchodilators. Microcytosis.   In light of her weight loss, f/u iron studies and consider outpatient colonoscopy referral.        DVT Prophylaxis: enoxaparin  Diet: ADULT DIET; Regular; 4 carb choices (60 gm/meal); Low Sodium (2 gm)  Code Status: Full Code    PT/OT Eval Status: not indicated    Dispo - perhaps 11/24, pending improvement in cellulitis and general surgery input. She lives at home. Chapis Du MD    Thank you [facility] for the opportunity to be involved in this patient's care. If you have any questions or concerns please feel free to contact me at [phone].

## 2022-11-22 NOTE — CARE COORDINATION
CASE MANAGEMENT INITIAL ASSESSMENT      Reviewed chart and completed assessment with patient:Pt  Family present: None  Explained Case Management role/services. Yes    Primary contact information:Pt's daughter Ailyn Cedeño :   Primary Decision Maker: Joshua Austin - Child - [phone]    Secondary Decision Maker: Leigha Kenyon - Child - [phone]          Admit date/status:11/22/22  Diagnosis: abscess    Is this a Readmission?:  No      Insurance:[facility] required for SNF: Yes       3 night stay required: No    Living arrangements, Adls, care needs, prior to admission:Pt lives at home alone and states she is independent of ADL'S at home     [facility] at home:  Walker__Cane__RTS__ BSC__Shower Chair__  02_x 3LNC [employer] _ HHN__ CPAP__  BiPap__  Hospital Bed__ W/C___ Other_____    Services in the home and/or outpatient, prior to admission:None               Medications: Prescription coverage? No Will pt require financial assistance with medications No     Transportation needs: Family will transport      Dialysis Facility (if applicable)   Name:  Address:  Dialysis Schedule:  Phone:  Fax:    PT/OT recs: Not yet ordered     Hospital Exemption Notification (HEN):Needed for SNF     Barriers to discharge:None     Plan/comments:11/22/22 Pt's from home alone, independent of ADL'S per pt.  Pt is on 3LNC o2 at home, here for abscess general surgery consulted, watch for possible antibiotic/ wound needs       ECOC on chart for MD signature

## 2022-11-22 NOTE — ED NOTES
Report called called to Nguyen Lobo at [facility]. RN made aware  Of out standing  Vancomycin order.  Report also called given to EMS team that is to transport pt     Lisset Oscar, [facility]  11/22/22 2739

## 2022-11-22 NOTE — ED TRIAGE NOTES
Pt arrives via hospital wheelchair for eval of abscess which she was admitted to [facility] from [facility] and left ama  before surgery . Pt  is a/ox4, rsp nonlabored and pwd.

## 2022-11-22 NOTE — RT PROTOCOL NOTE
RT Inhaler-Nebulizer Bronchodilator Protocol Note    There is a bronchodilator order in the chart from a provider indicating to follow the RT Bronchodilator Protocol and there is an Initiate RT Inhaler-Nebulizer Bronchodilator Protocol order as well (see protocol at bottom of note). CXR Findings:  XR CHEST PORTABLE    Result Date: 11/22/2022  No acute airspace disease identified. The findings from the last RT Protocol Assessment were as follows:   History Pulmonary Disease: Smoker 15 pack years or more  Respiratory Pattern: Regular pattern and RR 12-20 bpm  Breath Sounds: Slightly diminished and/or crackles  Cough: Strong, spontaneous, non-productive  Indication for Bronchodilator Therapy: On home bronchodilators  Bronchodilator Assessment Score: 3    Aerosolized bronchodilator medication orders have been revised according to the RT Inhaler-Nebulizer Bronchodilator Protocol below. Respiratory Therapist to perform RT Therapy Protocol Assessment initially then follow the protocol. Repeat RT Therapy Protocol Assessment PRN for score 0-3 or on second treatment, BID, and PRN for scores above 3. No Indications - adjust the frequency to every 6 hours PRN wheezing or bronchospasm, if no treatments needed after 48 hours then discontinue using Per Protocol order mode. If indication present, adjust the RT bronchodilator orders based on the Bronchodilator Assessment Score as indicated below. Use Inhaler orders unless patient has one or more of the following: on home nebulizer, not able to hold breath for 10 seconds, is not alert and oriented, cannot activate and use MDI correctly, or respiratory rate 25 breaths per minute or more, then use the equivalent nebulizer order(s) with same Frequency and PRN reasons based on the score. If a patient is on this medication at home then do not decrease Frequency below that used at home.     0-3 - enter or revise RT bronchodilator order(s) to equivalent RT Bronchodilator order with Frequency of every 4 hours PRN for wheezing or increased work of breathing using Per Protocol order mode. 4-6 - enter or revise RT Bronchodilator order(s) to two equivalent RT bronchodilator orders with one order with BID Frequency and one order with Frequency of every 4 hours PRN wheezing or increased work of breathing using Per Protocol order mode. 7-10 - enter or revise RT Bronchodilator order(s) to two equivalent RT bronchodilator orders with one order with TID Frequency and one order with Frequency of every 4 hours PRN wheezing or increased work of breathing using Per Protocol order mode. 11-13 - enter or revise RT Bronchodilator order(s) to one equivalent RT bronchodilator order with QID Frequency and an Albuterol order with Frequency of every 4 hours PRN wheezing or increased work of breathing using Per Protocol order mode. Greater than 13 - enter or revise RT Bronchodilator order(s) to one equivalent RT bronchodilator order with every 4 hours Frequency and an Albuterol order with Frequency of every 2 hours PRN wheezing or increased work of breathing using Per Protocol order mode. RT to enter RT Home Evaluation for COPD & MDI Assessment order using Per Protocol order mode.     Electronically signed by Lori Lopez RCP on 11/22/2022 at 8:26 AM

## 2022-11-22 NOTE — CONSULTS
Department of General Surgery Consult    PATIENT NAME: Bianca Figueroa   YOB: 1970    ADMISSION DATE: 11/22/2022  6:16 AM      TODAY'S DATE: 11/22/2022    Reason for Consult:  buttock wound    Chief Complaint: pain, odor buttock    Requesting Physician:  Donna    HISTORY OF PRESENT ILLNESS:              The patient is a 45 y.o. female who presents with complaints of increase size of buttock wound with foul smelling odor. States this has been increasing in sie over the past week. She reports she has been sitting more lately. She denies fever or chills.      Past Medical History:        Diagnosis Date    Anemia     Asthma     Cerebral artery occlusion with cerebral infarction (HCC)     CHF (congestive heart failure) (HCC)     COPD (chronic obstructive pulmonary disease) ([facility] [address]     Diabetes ([facility] [address]     Edema     Foot ulcer ([facility] [address]     HTN (hypertension), benign 11/19/2013    Hyperlipidemia        Past Surgical History:        Procedure Laterality Date    HERNIA REPAIR      TUBAL LIGATION         Current Medications:   Current Facility-Administered Medications: albuterol sulfate HFA (PROVENTIL;VENTOLIN;PROAIR) 108 (90 Base) MCG/ACT inhaler 1 puff, 1 puff, Inhalation, Q4H PRN  atorvastatin (LIPITOR) tablet 40 mg, 40 mg, Oral, Nightly  aspirin EC tablet 81 mg, 81 mg, Oral, Daily  mometasone-formoterol (DULERA) 200-5 MCG/ACT inhaler 2 puff, 2 puff, Inhalation, BID  insulin glargine (LANTUS) injection vial 20 Units, 20 Units, SubCUTAneous, QAM  glucose chewable tablet 16 g, 4 tablet, Oral, PRN  dextrose bolus 10% 125 mL, 125 mL, IntraVENous, PRN **OR** dextrose bolus 10% 250 mL, 250 mL, IntraVENous, PRN  glucagon (rDNA) injection 1 mg, 1 mg, SubCUTAneous, PRN  dextrose 10 % infusion, , IntraVENous, Continuous PRN  sodium chloride flush 0.9 % injection 5-40 mL, 5-40 mL, IntraVENous, 2 times per day  sodium chloride flush 0.9 % injection 5-40 mL, 5-40 mL, IntraVENous, PRN  0.9 % sodium chloride infusion, , IntraVENous, PRN  enoxaparin (LOVENOX) injection 40 mg, 40 mg, SubCUTAneous, Daily  polyethylene glycol (GLYCOLAX) packet 17 g, 17 g, Oral, Daily PRN  acetaminophen (TYLENOL) tablet 650 mg, 650 mg, Oral, Q6H PRN **OR** acetaminophen (TYLENOL) suppository 650 mg, 650 mg, Rectal, Q6H PRN  levothyroxine (SYNTHROID) tablet 25 mcg, 25 mcg, Oral, QAM AC  losartan (COZAAR) tablet 100 mg, 100 mg, Oral, Daily  tiotropium-olodaterol (STIOLTO) 2.5-2.5 MCG/ACT inhaler 1 puff, 1 puff, Inhalation, BID  torsemide (DEMADEX) tablet 20 mg, 20 mg, Oral, Daily  prochlorperazine (COMPAZINE) injection 10 mg, 10 mg, IntraVENous, Q6H PRN  insulin lispro (HUMALOG) injection vial 0-8 Units, 0-8 Units, SubCUTAneous, TID WC  insulin lispro (HUMALOG) injection vial 0-4 Units, 0-4 Units, SubCUTAneous, Nightly  hydrALAZINE (APRESOLINE) injection 5 mg, 5 mg, IntraVENous, Q4H PRN  oxyCODONE (ROXICODONE) immediate release tablet 5 mg, 5 mg, Oral, Q4H PRN  methadone (DOLOPHINE) tablet 40 mg, 40 mg, Oral, Daily  cefepime (MAXIPIME) 2000 mg IVPB minibag, 2,000 mg, IntraVENous, Q12H  carvedilol (COREG) tablet 6.25 mg, 6.25 mg, Oral, BID WC  vancomycin 1000 mg IVPB in 250 mL D5W addavial, 1,000 mg, IntraVENous, Q12H  Prior to Admission medications    Medication Sig Start Date End Date Taking?  Authorizing Provider   tiotropium-olodaterol (STIOLTO RESPIMAT) 2.5-2.5 MCG/ACT AERS Inhale 1 puff into the lungs in the morning and at bedtime 3/10/21  Yes Historical Provider, MD   olmesartan (BENICAR) 40 MG tablet Take 1 tablet by mouth daily 10/31/22  Yes Historical Provider, MD   torsemide (DEMADEX) 20 MG tablet Take 1 tablet by mouth daily 10/31/22  Yes Historical Provider, MD   levothyroxine (SYNTHROID) 25 MCG tablet Take 1 tablet by mouth every morning (before breakfast) 10/17/22  Yes Historical Provider, MD   albuterol sulfate HFA (PROVENTIL;VENTOLIN;PROAIR) 108 (90 Base) MCG/ACT inhaler Inhale 1 puff into the lungs every 4 hours as needed 12/7/17  Yes Historical Provider, MD   insulin glargine (LANTUS SOLOSTAR) 100 UNIT/ML injection pen Inject 20 Units into the skin nightly 3/20/21  Yes Historical Provider, MD   atorvastatin (LIPITOR) 40 MG tablet Take 1 tablet by mouth nightly 8/26/22  Yes Historical Provider, MD   loratadine (CLARITIN) 10 MG tablet Take 1 tablet by mouth daily as needed 7/1/22  Yes Historical Provider, MD   insulin lispro (HUMALOG) 100 UNIT/ML SOLN injection vial Inject 7 Units into the skin 3 times daily (before meals) 6/15/22  Yes Historical Provider, MD   carvedilol (COREG) 6.25 MG tablet Take 2 tablets by mouth 2 times daily (with meals) 1/18/22  Yes Historical Provider, MD   methadone (DOLOPHINE) 10 MG tablet Take 40 mg by mouth daily. Historical Provider, MD        Allergies:  Codeine and Pcn [penicillins]    Social History:   TOBACCO:   reports that she has quit smoking. Her smoking use included cigarettes. She has a 25.00 pack-year smoking history. She has never used smokeless tobacco.  ETOH:   reports no history of alcohol use. DRUGS:   reports no history of drug use. Family History:        Problem Relation Age of Onset    COPD Mother     Early Death Brother     Cancer Maternal Aunt     Cancer Maternal Grandmother        REVIEW OF SYSTEMS:  CONSTITUTIONAL:  negative  HEENT:  negative  RESPIRATORY:  negative  CARDIOVASCULAR:  negative  GASTROINTESTINAL:  negative  GENITOURINARY:  negative  HEMATOLOGIC/LYMPHATIC:  negative  NEUROLOGICAL:  Negative  * All other ROS reviewed and negative. PHYSICAL EXAM:  VITALS:  BP (!) 148/71   Pulse 75   Temp 98 °F (36.7 °C) (Oral)   Resp 18   Ht 5' 3\" (1.6 m)   Wt 198 lb (89.8 kg)   SpO2 90%   BMI 35.07 kg/m²   24HR INTAKE/OUTPUT:    No intake/output data recorded. No intake/output data recorded.       CONSTITUTIONAL:  alert, no apparent distress and moderately obese  EYES:  PERRL, sclera clear  ENT:  Normocephalic,atraumatic, without obvious abnormality  NECK: supple, symmetrical, trachea midline  LUNGS: Resp effort easy and unlabored, no crackles or wheezing  CARDIOVASCULAR:  NO JVD, regular rate and rhythm   ABDOMEN: obese, benign  MUSCULOSKELETAL: right lower buttock with approx 5-6cm circumferential area of eschar with some fibrinous slough medially, foul odor, mild surrounding erythema  NEUROLOGIC:  Mental Status Exam:  Level of Alertness:   awake  PSYCHIATRIC:   person, place, time      DATA:    CBC:   Recent Labs     11/22/22 0120   WBC 6.1   HGB 16.6*   HCT 56.9*        BMP:    Recent Labs     11/22/22 0140      K 5.0   CL 97*   CO2 33*   BUN 23*   CREATININE 0.8   GLUCOSE 115*     Hepatic:   Recent Labs     11/22/22 0140   AST 15   ALT 8*   BILITOT 1.0   ALKPHOS 119     Mag:    No results for input(s): MG in the last 72 hours. Phos:   No results for input(s): PHOS in the last 72 hours. INR: No results for input(s): INR in the last 72 hours. Radiology Review: Images personally reviewed by me. EXAMINATION:   CT OF THE PELVIS WITH CONTRAST 11/22/2022 2:38 am       TECHNIQUE:   CT of the pelvis was performed with the administration of intravenous   contrast. Multiplanar reformatted images are provided for review. Automated   exposure control, iterative reconstruction, and/or weight based adjustment of   the mA/kV was utilized to reduce the radiation dose to as low as reasonably   achievable. COMPARISON:   None. HISTORY:   ORDERING SYSTEM PROVIDED HISTORY: gluteal abscess   TECHNOLOGIST PROVIDED HISTORY:   Reason for exam:->gluteal abscess   Additional Contrast?->None   Decision Support Exception - unselect if not a suspected or confirmed   emergency medical condition->Emergency Medical Condition (MA)   Reason for Exam: Patient says she has an abscess on her right buttocks x's 2   weeks   Relevant Medical/Surgical History: DM HTN       FINDINGS:   Mildly diffuse induration of the subcutaneous tissue.   No fluid collection or   gas identified. No perianal fluid collection identified. Mildly enlarged   bilateral inguinal lymph nodes are noted. Layering sludge versus stones in the gallbladder. No wall thickening   identified. Moderate stool burden throughout the colon. No bowel dilatation   or wall thickening identified in the field of view. Calcified atheromatous plaque. Occluded left SFA stent. Diffuse mild   induration of the subcutaneous fat. No soft tissue gas or fluid collection   identified           Impression   No fluid collection or soft tissue gas identified. IMPRESSION/RECOMMENDATIONS:    Right buttock ulcer with eschar and foul odor. Will need formal debridement in OR later today - pt did unfortunately eat breakfast early this AM, so will need to be done later this afternoon. Continue with IV antibiotics and off loading. Electronically signed by Edel Mathew [address] [place] [address] Surgery  [id number]    Surgery Staff    I have examined this patient, and read and agree with the note by Edel Mathew CNP from today; more than half of the total time was spent by me on the encounter. Moderate-sized decubitus wound of the right buttock along the reza cleft, with necrotic eschar. This will benefit from surgical debridement to clean the wound and better allow for full healing in the future. We discussed this plan in detail, including risks and complications such as bleeding and need for further debridement in the future. Patient indicates she understands, asks appropriate questions, and agrees to proceed.     Geraldine Chin MD

## 2022-11-22 NOTE — FLOWSHEET NOTE
11/22/22 0630   Vital Signs   Temp 98 °F (36.7 °C)   Temp Source Oral   Heart Rate 73   Heart Rate Source Monitor   Resp 18   BP (!) 173/67   MAP (Calculated) 102   MAP (mmHg) 102   BP Location Left upper arm   BP Method Automatic   Patient Position Sitting   Level of Consciousness 0   MEWS Score 1   Pain Assessment   Pain Assessment 0-10   Opioid-Induced Sedation   POSS Score 1   RASS   Cardenas Agitation Sedation Scale (RASS) 0   Oxygen Therapy   SpO2 90 %   Pulse Oximetry Type Intermittent   Pulse Oximeter Device Mode Intermittent   Pulse Oximeter Device Location Left;Hand   O2 Device Nasal cannula   Skin Assessment Clean, dry, & intact   O2 Flow Rate (L/min) 4 L/min   Height and Weight   Height 5' 3\" (1.6 m)   Weight 198 lb (89.8 kg)   Weight Method Stated   BSA (Calculated - sq m) 2 sq meters   BMI (Calculated) 35.1   Patient Observation   Observations admission   Patient arrived from [facility] ED, states she was supposed to go to [facility] [address] [facility] and not [facility] because this hospital is far from her family. Patient states she told the nurse at the ED [facility] [address] is the only hospital she would agree to be admitted at. VSS, no distress noted, no c/o pain.

## 2022-11-22 NOTE — PROGRESS NOTES
Patient leaving AMA. IV removed, telemetry box and leads removed and returned. All belongings gathered and returned to patient. No further needs.

## 2022-11-22 NOTE — ED PROVIDER NOTES
[address] [facility]        Pt Name: Anna Garcia  MRN: [id number]  [place] 1970  Date of evaluation: 11/22/2022  Provider: Suman Salmeron PA-C  PCP: [facility]  Note Started: 6:38 PM EST11/22/2022       BUTCH. I have evaluated this patient. My supervising physician was available for consultation. Triage CHIEF COMPLAINT       Chief Complaint   Patient presents with    Abscess     Pt arrives via hospital wheelchair for eval of abscess which she was admitted to [facility] from [address] [place] [address] and left ama  before surgery          HISTORY OF PRESENT ILLNESS   (Location/Symptom, Timing/Onset, Context/Setting, Quality, Duration, Modifying Factors, Severity)  Note limiting factors. Chief Complaint: ulcer    Anna Garcia is a 45 y.o. female who presents to the emergency department for reevaluation of the wound to her left buttocks that has been present over the past several weeks. Since 11/14 it has worsened. She states that it is started draining for the past several days and has a foul smelling odor. This is not happened in the past.  She endorses sitting quite often. She continues to deny fevers or chills, change in urination or bowel movements    Nursing Notes were all reviewed and agreed with or any disagreements were addressed in the HPI. REVIEW OF SYSTEMS    (2-9 systems for level 4, 10 or more for level 5)     Review of Systems   Constitutional:  Negative for chills and fever. HENT:  Negative for ear pain and sore throat. Eyes:  Negative for pain and visual disturbance. Respiratory:  Negative for cough and shortness of breath. Cardiovascular:  Negative for chest pain and leg swelling. Gastrointestinal:  Negative for abdominal pain, constipation, diarrhea, nausea and vomiting. Genitourinary:  Negative for dysuria and hematuria. Musculoskeletal:  Negative for back pain and neck pain.    Skin: Positive for rash and wound. Neurological:  Negative for light-headedness and headaches. PAST MEDICAL HISTORY     Past Medical History:   Diagnosis Date    Anemia     Asthma     Cerebral artery occlusion with cerebral infarction (HCC)     CHF (congestive heart failure) (HCC)     COPD (chronic obstructive pulmonary disease) ([facility]     Diabetes ([facility]     Edema     Foot ulcer ([facility]     HTN (hypertension), benign 11/19/2013    Hyperlipidemia        SURGICAL HISTORY     Past Surgical History:   Procedure Laterality Date    HERNIA REPAIR      TUBAL LIGATION         CURRENTMEDICATIONS       Previous Medications    ALBUTEROL SULFATE HFA (PROVENTIL;VENTOLIN;PROAIR) 108 (90 BASE) MCG/ACT INHALER    Inhale 1 puff into the lungs every 4 hours as needed    ATORVASTATIN (LIPITOR) 40 MG TABLET    Take 1 tablet by mouth nightly    CARVEDILOL (COREG) 6.25 MG TABLET    Take 2 tablets by mouth 2 times daily (with meals)    INSULIN GLARGINE (LANTUS SOLOSTAR) 100 UNIT/ML INJECTION PEN    Inject 20 Units into the skin nightly    INSULIN LISPRO (HUMALOG) 100 UNIT/ML SOLN INJECTION VIAL    Inject 7 Units into the skin 3 times daily (before meals)    LEVOTHYROXINE (SYNTHROID) 25 MCG TABLET    Take 1 tablet by mouth every morning (before breakfast)    LORATADINE (CLARITIN) 10 MG TABLET    Take 1 tablet by mouth daily as needed    METHADONE (DOLOPHINE) 10 MG TABLET    Take 40 mg by mouth daily.     OLMESARTAN (BENICAR) 40 MG TABLET    Take 1 tablet by mouth daily    TIOTROPIUM-OLODATEROL (STIOLTO RESPIMAT) 2.5-2.5 MCG/ACT AERS    Inhale 1 puff into the lungs in the morning and at bedtime    TORSEMIDE (DEMADEX) 20 MG TABLET    Take 1 tablet by mouth daily       ALLERGIES     Codeine and Pcn [penicillins]    FAMILYHISTORY       Family History   Problem Relation Age of Onset    COPD Mother     Early Death Brother     Cancer Maternal Aunt     Cancer Maternal Grandmother         SOCIAL HISTORY       Social History     Socioeconomic History Marital status: Single     Spouse name: None    Number of children: None    Years of education: None    Highest education level: None   Tobacco Use    Smoking status: Former     Packs/day: 1.00     Years: 25.00     Pack years: 25.00     Types: Cigarettes    Smokeless tobacco: Never    Tobacco comments:     quit 3 weeks ago   Substance and Sexual Activity    Alcohol use: No    Drug use: No    Sexual activity: Never     Partners: Male       SCREENINGS    Kamaljit Coma Scale  Eye Opening: Spontaneous  Best Verbal Response: Oriented  Best Motor Response: Obeys commands  [place] Coma Scale Score: 15        PHYSICAL EXAM    (up to 7 for level 4, 8 or more for level 5)     ED Triage Vitals [11/22/22 1700]   BP Temp Temp src Heart Rate Resp SpO2 Height Weight   (!) 154/77 98 °F (36.7 °C) -- 74 16 95 % -- 192 lb 14.4 oz (87.5 kg)       Physical Exam  Constitutional:       General: She is not in acute distress. Appearance: Normal appearance. She is not ill-appearing, toxic-appearing or diaphoretic. HENT:      Head: Normocephalic and atraumatic. Right Ear: External ear normal.      Left Ear: External ear normal.      Nose: Nose normal.   Eyes:      General:         Right eye: No discharge. Left eye: No discharge. Pulmonary:      Effort: Pulmonary effort is normal. No respiratory distress. Musculoskeletal:         General: Normal range of motion. Cervical back: Normal range of motion. Skin:     General: Skin is warm and dry. Neurological:      General: No focal deficit present. Mental Status: She is alert and oriented to person, place, and time.    Psychiatric:         Mood and Affect: Mood normal.         Behavior: Behavior normal.         DIAGNOSTIC RESULTS   LABS:    Labs Reviewed   CBC WITH AUTO DIFFERENTIAL - Abnormal; Notable for the following components:       Result Value    RBC 7.56 (*)     Hematocrit 56.3 (*)     MCV 74.4 (*)     MCH 20.9 (*)     MCHC 28.1 (*)     RDW 22.2 (*) Lymphocytes Absolute 0.7 (*)     All other components within normal limits   COMPREHENSIVE METABOLIC PANEL W/ REFLEX TO MG FOR LOW K - Abnormal; Notable for the following components:    Chloride 94 (*)     CO2 36 (*)     Glucose 216 (*)     Albumin 3.3 (*)     Albumin/Globulin Ratio 0.9 (*)     All other components within normal limits       When ordered, only abnormal lab results are displayed. All other labs were within normal range or not returned as of this dictation. EKG: When ordered, EKG's are interpreted by the Emergency Department Physician in the absence of a cardiologist.  Please see their note for interpretation of EKG. RADIOLOGY:   Non-plain film images such as CT, Ultrasound and MRI are read by the radiologist. Plain radiographic images are visualized and preliminarily interpreted by the  ED Provider with the below findings:        Interpretation perthe Radiologist below, if available at the time of this note:    No orders to display     CT PELVIS W CONTRAST Additional Contrast? None    Result Date: 11/22/2022  EXAMINATION: CT OF THE PELVIS WITH CONTRAST 11/22/2022 2:38 am TECHNIQUE: CT of the pelvis was performed with the administration of intravenous contrast. Multiplanar reformatted images are provided for review. Automated exposure control, iterative reconstruction, and/or weight based adjustment of the mA/kV was utilized to reduce the radiation dose to as low as reasonably achievable. COMPARISON: None. HISTORY: ORDERING SYSTEM PROVIDED HISTORY: gluteal abscess TECHNOLOGIST PROVIDED HISTORY: Reason for exam:->gluteal abscess Additional Contrast?->None Decision Support Exception - unselect if not a suspected or confirmed emergency medical condition->Emergency Medical Condition (MA) Reason for Exam: Patient says she has an abscess on her right buttocks x's 2 weeks Relevant Medical/Surgical History: DM HTN FINDINGS: Mildly diffuse induration of the subcutaneous tissue.   No fluid collection or gas identified. No perianal fluid collection identified. Mildly enlarged bilateral inguinal lymph nodes are noted. Layering sludge versus stones in the gallbladder. No wall thickening identified. Moderate stool burden throughout the colon. No bowel dilatation or wall thickening identified in the field of view. Calcified atheromatous plaque. Occluded left SFA stent. Diffuse mild induration of the subcutaneous fat. No soft tissue gas or fluid collection identified     No fluid collection or soft tissue gas identified. XR CHEST PORTABLE    Result Date: 11/22/2022  EXAMINATION: ONE XRAY VIEW OF THE CHEST 11/22/2022 1:01 am COMPARISON: 07/18/2017 HISTORY: ORDERING SYSTEM PROVIDED HISTORY: sob TECHNOLOGIST PROVIDED HISTORY: Reason for exam:->sob Reason for Exam: Abscess on buttocks x's 2 weeks, SOB, diabetic FINDINGS: Mild cardiac silhouette enlargement. There is no consolidation, pneumothorax or evidence for edema. No evidence for effusion. No acute osseous abnormality is identified. No acute airspace disease identified. PROCEDURES   Unless otherwise noted below, none     Procedures        CONSULTS:  IP CONSULT TO HOSPITALIST      EMERGENCY DEPARTMENT COURSE and DIFFERENTIAL DIAGNOSIS/MDM:   Vitals:    Vitals:    11/22/22 1700   BP: (!) 154/77   Pulse: 74   Resp: 16   Temp: 98 °F (36.7 °C)   SpO2: 95%   Weight: 192 lb 14.4 oz (87.5 kg)       Patient was given the following medications:  Medications   cefepime (MAXIPIME) 2000 mg IVPB minibag (has no administration in time range)         Is this patient to be included in the SEP-1 Core Measure due to severe sepsis or septic shock? No   Exclusion criteria - the patient is NOT to be included for SEP-1 Core Measure due to:  2+ SIRS criteria are not met    This is a 45y.o. year old female who presents to the ED with complaint of pressure ulcer that has been present for the past several weeks.  Patient was transferred from [address] [place] [address] last night after admission to [facility] where she was supposed to get surgery today. However patient left AGAINST MEDICAL ADVICE as she wanted to come to [facility] since it was closer to her house. She feels like she did not know she was being transferred to [facility] and that is why she was not comfortable there. States her family is over here. .   Vitals upon arrival show mildly hypertensive, otherwise within normal limits. Physical exam shows as above. Blood work was performed:  -No leukocytosis    Imaging was ordered and performed and reviewed and read by radiologist as above showing no fluid collection or soft tissue gas identified, which was performed at 0100 earlier today. On her initial visit in the ED. Patient was supposed to go to the operating room today with general surgery for debridement, however she left [address] prior to this. She comes here requesting admission as she would like the same treatment. She was last given cefepime at 0456 and vancomycin at 1024 today. Another dose of the cefepime was ordered, and the vancomycin was not ordered as she is not due. Discussed admission with her and she states that she only wanted to come to [facility] and she is willing to stay and have surgery if needed. She was admitted in stable condition. I am the Primary Clinician of Record. FINAL IMPRESSION      1. Pressure injury of skin of right buttock, unspecified injury stage          DISPOSITION/PLAN   DISPOSITION Admitted 11/22/2022 07:25:57 PM      PATIENT REFERREDTO:  No follow-up provider specified.     DISCHARGE MEDICATIONS:  New Prescriptions    No medications on file       DISCONTINUED MEDICATIONS:  Discontinued Medications    No medications on file              (Please note that portions ofthis note were completed with a voice recognition program.  Efforts were made to edit the dictations but occasionally words are mis-transcribed.)    Suman Salmeron PA-C (electronically signed)              Ry Bradshaw PA-C  11/22/22 1926

## 2022-11-22 NOTE — CONSULTS
Clinical Pharmacy Note  Vancomycin Consult    Pharmacy consult received for one-time dose of vancomycin in the Emergency Department per Dr. Rashaun Gaines. Ht Readings from Last 1 Encounters:   09/14/18 5' 3\" (1.6 m)        Wt Readings from Last 1 Encounters:   09/14/18 182 lb (82.6 kg)         Assessment/Plan:  Vancomycin 1500 mg x 1 in ED. If Vancomycin is to continue on admission and pharmacy is to manage dosing, please re-consult with admission orders.     Gregg Ca, PharmD

## 2022-11-22 NOTE — CONSULTS
Pharmacy Note  Vancomycin Consult    Marixa Waller is a 45 y.o. female started on Vancomycin for skin and soft tissue infection; consult received from Dr. Yajaira Nelson to manage therapy. Also receiving the following antibiotics: Cefepime. Allergies:  Codeine and Pcn [penicillins]     Tmax: 98    Recent Labs     11/22/22  0140   CREATININE 0.8       Recent Labs     11/22/22  0120   WBC 6.1       Estimated Creatinine Clearance: 86 mL/min (based on SCr of 0.8 mg/dL). No intake or output data in the 24 hours ending 11/22/22 0832    Wt Readings from Last 1 Encounters:   11/22/22 198 lb (89.8 kg)         Body mass index is 35.07 kg/m². Loading dose (critically ill or in ICU, require dialysis or renal replacement therapy): Vancomycin 25 mg/kg IVPB x 1 (maximum 2500 mg). Maintenance dose: 15 mg/kg (maximum: 2000 mg/dose and 4500 mg/day) starting at the next dosing interval determined by renal function  Pulse dose: fluctuating renal function, MARICRUZ, ESRD   Goal Vancomycin trough: 10-15 mcg/mL or 15-20 mcg/mL   Goal Vancomycin AUC: 400-600     Assessment/Plan:  Will initiate Vancomycin 1000 mg IV every 12 hours. Calculated . Vancomycin level ordered for 11/23 08888. Timing of trough level will be determined based on culture results, renal function, and clinical response. Thank you for the consult.   Keily Barakat, PharmD  11/22/2022 at 8:33 AM

## 2022-11-22 NOTE — CARE COORDINATION
GUILLERMO Consult:  Patient and her son in [facility] ED Lobby. They requested to talk to GUILLERMO. Patient and son reported that - [facility] and she needed to be admitted and they sent her to [facility], Patient reports that she ONLY likes [facility] so they left [place] [address] and came to [address] [place] [address] Rd explained to patient and her son that she was sent to [facility] because there are no inpatient beds here at [address] [place] [address] currently have 12 patients waiting for bed in the ED now. Son reported that his mother is suppose to have surgery. Encouraged them to sign in at registration and the staff will bring her back as soon as possible. GUILLERMO informed Charge RN of patient's situation.

## 2022-11-23 VITALS
TEMPERATURE: 97.7 F | HEART RATE: 74 BPM | SYSTOLIC BLOOD PRESSURE: 121 MMHG | WEIGHT: 191.36 LBS | DIASTOLIC BLOOD PRESSURE: 68 MMHG | HEIGHT: 63 IN | OXYGEN SATURATION: 92 % | BODY MASS INDEX: 33.91 KG/M2 | RESPIRATION RATE: 16 BRPM

## 2022-11-23 LAB
ANION GAP SERPL CALCULATED.3IONS-SCNC: 10 MMOL/L (ref 3–16)
BASOPHILS ABSOLUTE: 0 K/UL (ref 0–0.2)
BASOPHILS RELATIVE PERCENT: 0.2 %
BUN BLDV-MCNC: 20 MG/DL (ref 7–20)
C-REACTIVE PROTEIN: 12.7 MG/L (ref 0–5.1)
CALCIUM SERPL-MCNC: 9 MG/DL (ref 8.3–10.6)
CHLORIDE BLD-SCNC: 95 MMOL/L (ref 99–110)
CO2: 35 MMOL/L (ref 21–32)
CREAT SERPL-MCNC: 0.8 MG/DL (ref 0.6–1.1)
EOSINOPHILS ABSOLUTE: 0.1 K/UL (ref 0–0.6)
EOSINOPHILS RELATIVE PERCENT: 1.7 %
GFR SERPL CREATININE-BSD FRML MDRD: >60 ML/MIN/{1.73_M2}
GLUCOSE BLD-MCNC: 143 MG/DL (ref 70–99)
GLUCOSE BLD-MCNC: 156 MG/DL (ref 70–99)
GLUCOSE BLD-MCNC: 165 MG/DL (ref 70–99)
HCT VFR BLD CALC: 56.5 % (ref 36–48)
HEMOGLOBIN: 15.9 G/DL (ref 12–16)
LYMPHOCYTES ABSOLUTE: 0.5 K/UL (ref 1–5.1)
LYMPHOCYTES RELATIVE PERCENT: 9.7 %
MCH RBC QN AUTO: 21 PG (ref 26–34)
MCHC RBC AUTO-ENTMCNC: 28.2 G/DL (ref 31–36)
MCV RBC AUTO: 74.4 FL (ref 80–100)
MONOCYTES ABSOLUTE: 0.4 K/UL (ref 0–1.3)
MONOCYTES RELATIVE PERCENT: 7.5 %
MRSA SCREEN RT-PCR: NORMAL
NEUTROPHILS ABSOLUTE: 4.4 K/UL (ref 1.7–7.7)
NEUTROPHILS RELATIVE PERCENT: 80.9 %
PDW BLD-RTO: 21.9 % (ref 12.4–15.4)
PERFORMED ON: ABNORMAL
PERFORMED ON: ABNORMAL
PLATELET # BLD: 159 K/UL (ref 135–450)
PMV BLD AUTO: 8.4 FL (ref 5–10.5)
POTASSIUM REFLEX MAGNESIUM: 4.8 MMOL/L (ref 3.5–5.1)
RBC # BLD: 7.59 M/UL (ref 4–5.2)
SEDIMENTATION RATE, ERYTHROCYTE: 30 MM/HR (ref 0–30)
SODIUM BLD-SCNC: 140 MMOL/L (ref 136–145)
WBC # BLD: 5.5 K/UL (ref 4–11)

## 2022-11-23 PROCEDURE — 6370000000 HC RX 637 (ALT 250 FOR IP): Performed by: FAMILY MEDICINE

## 2022-11-23 PROCEDURE — 36415 COLL VENOUS BLD VENIPUNCTURE: CPT

## 2022-11-23 PROCEDURE — 87641 MR-STAPH DNA AMP PROBE: CPT

## 2022-11-23 PROCEDURE — 85025 COMPLETE CBC W/AUTO DIFF WBC: CPT

## 2022-11-23 PROCEDURE — 6360000002 HC RX W HCPCS: Performed by: NURSE PRACTITIONER

## 2022-11-23 PROCEDURE — 6370000000 HC RX 637 (ALT 250 FOR IP): Performed by: NURSE PRACTITIONER

## 2022-11-23 PROCEDURE — 80048 BASIC METABOLIC PNL TOTAL CA: CPT

## 2022-11-23 PROCEDURE — 2580000003 HC RX 258: Performed by: NURSE PRACTITIONER

## 2022-11-23 PROCEDURE — APPNB15 APP NON BILLABLE TIME 0-15 MINS: Performed by: NURSE PRACTITIONER

## 2022-11-23 PROCEDURE — 85652 RBC SED RATE AUTOMATED: CPT

## 2022-11-23 PROCEDURE — 86140 C-REACTIVE PROTEIN: CPT

## 2022-11-23 RX ORDER — LINEZOLID 600 MG/1
600 TABLET, FILM COATED ORAL 2 TIMES DAILY
Qty: 20 TABLET | Refills: 0 | Status: SHIPPED | OUTPATIENT
Start: 2022-11-23 | End: 2022-12-03

## 2022-11-23 RX ORDER — INSULIN GLARGINE 100 [IU]/ML
10 INJECTION, SOLUTION SUBCUTANEOUS DAILY
Status: COMPLETED | OUTPATIENT
Start: 2022-11-23 | End: 2022-11-23

## 2022-11-23 RX ORDER — SACCHAROMYCES BOULARDII 250 MG
250 CAPSULE ORAL 2 TIMES DAILY
Qty: 28 CAPSULE | Refills: 0 | Status: SHIPPED | OUTPATIENT
Start: 2022-11-23 | End: 2022-12-07

## 2022-11-23 RX ORDER — INSULIN LISPRO 100 [IU]/ML
0-4 INJECTION, SOLUTION INTRAVENOUS; SUBCUTANEOUS
Status: DISCONTINUED | OUTPATIENT
Start: 2022-11-23 | End: 2022-11-23 | Stop reason: HOSPADM

## 2022-11-23 RX ORDER — INSULIN LISPRO 100 [IU]/ML
0-4 INJECTION, SOLUTION INTRAVENOUS; SUBCUTANEOUS NIGHTLY
Status: DISCONTINUED | OUTPATIENT
Start: 2022-11-23 | End: 2022-11-23 | Stop reason: HOSPADM

## 2022-11-23 RX ADMIN — LEVOTHYROXINE SODIUM 25 MCG: 0.03 TABLET ORAL at 06:10

## 2022-11-23 RX ADMIN — VANCOMYCIN HYDROCHLORIDE 1250 MG: 1.25 INJECTION, POWDER, LYOPHILIZED, FOR SOLUTION INTRAVENOUS at 01:42

## 2022-11-23 RX ADMIN — Medication 1 CAPSULE: at 08:23

## 2022-11-23 RX ADMIN — ATORVASTATIN CALCIUM 40 MG: 40 TABLET, FILM COATED ORAL at 01:43

## 2022-11-23 RX ADMIN — INSULIN GLARGINE 10 UNITS: 100 INJECTION, SOLUTION SUBCUTANEOUS at 08:20

## 2022-11-23 RX ADMIN — CEFEPIME 2000 MG: 2 INJECTION, POWDER, FOR SOLUTION INTRAVENOUS at 06:09

## 2022-11-23 RX ADMIN — TIOTROPIUM BROMIDE AND OLODATEROL 1 PUFF: 3.124; 2.736 SPRAY, METERED RESPIRATORY (INHALATION) at 08:17

## 2022-11-23 RX ADMIN — CARVEDILOL 12.5 MG: 12.5 TABLET, FILM COATED ORAL at 08:23

## 2022-11-23 RX ADMIN — LOSARTAN POTASSIUM 100 MG: 100 TABLET, FILM COATED ORAL at 08:23

## 2022-11-23 RX ADMIN — Medication 10 ML: at 01:43

## 2022-11-23 RX ADMIN — METHADONE HYDROCHLORIDE 40 MG: 10 TABLET ORAL at 08:23

## 2022-11-23 RX ADMIN — TORSEMIDE 20 MG: 20 TABLET ORAL at 08:23

## 2022-11-23 ASSESSMENT — PAIN SCALES - GENERAL: PAINLEVEL_OUTOF10: 0

## 2022-11-23 NOTE — PROGRESS NOTES
Comprehensive Nutrition Assessment    Type and Reason for Visit:  Initial, Wound    Nutrition Recommendations/Plan:   Add 2 gm Na to diet order due to CHF  Add Florin bid  Add Ensure HP bid  Monitor meal and supplement intake     Malnutrition Assessment:  Malnutrition Status:  No malnutrition (11/23/22 1309)    Context:  Chronic Illness     Nutrition Assessment:    Pt was seen today b/o stage 3/4 decubitus ulcer. Pt had surgical debridement today and is now discharged. Pt also has multiple diabetic ulcer on both legs and L 4th toe. PMH includes: COPD,HTN, HLD, CAD, CVA, CHF. Added 2 gm Na order to diet 2/2 CHF. Provided pt with diet information for CHF and [facility] diet. Will start Florin bid and Ensure HP if pt does not leave today. Nutrition Related Findings:    BM 11/21,generalized non pitting and BLE +2 pitting,weeping edema,  Wound Type: Stage III, Stage IV       Current Nutrition Intake & Therapies:    Average Meal Intake: %  Average Supplements Intake: None Ordered  ADULT DIET; Regular; 5 carb choices (75 gm/meal)  ADULT ORAL NUTRITION SUPPLEMENT; Breakfast, Dinner; Wound Healing Oral Supplement  ADULT ORAL NUTRITION SUPPLEMENT; Lunch, Breakfast; Low Calorie/High Protein Oral Supplement    Anthropometric Measures:  Height: 5' 3\" (160 cm)  Ideal Body Weight (IBW): 115 lbs (52 kg)    Current Body Weight: 191 lb 5.8 oz (86.8 kg), 166.4 % IBW. Weight Source: Standing Scale  Current BMI (kg/m2): 33.9  Weight Adjustment For: No Adjustment  BMI Categories: Obese Class 1 (BMI 30.0-34. 9)    Estimated Daily Nutrient Needs:  Energy (kcal/day): 9474-0201( 15-18 x CBW 87  Protein (g/day):   Fluid (ml/day): per provider    Nutrition Diagnosis:   Increased nutrient needs related to inadequate protein-energy intake as evidenced by wounds    Nutrition Interventions:   Food and/or Nutrient Delivery: Modify Current Diet  Nutrition Education/Counseling: Education completed  Coordination of Nutrition Care: Continue to monitor while inpatient    Goals:  Goals: Meet at least 75% of estimated needs       Nutrition Monitoring and Evaluation:   Behavioral-Environmental Outcomes: None Identified  Food/Nutrient Intake Outcomes: Food and Nutrient Intake, Supplement Intake  Physical Signs/Symptoms Outcomes: Biochemical Data, Fluid Status or Edema, Nutrition Focused Physical Findings, Skin, Weight    Discharge Planning:    Continue Oral Nutrition Supplement     Darcy Babb, [address] [place] [address],   Contact: [phone]

## 2022-11-23 NOTE — ED NOTES
Pt ambulated to restroom. tolerated well. No additional needs at this time. Connected pt to cardiac monitor, pulse ox, and bp cuff. Ok to start antibiotic per PA.       Mirta Lai RN  11/22/22 2900

## 2022-11-23 NOTE — CARE COORDINATION
[facility]    DC order noted, all docs needed have been faxed to [facility] for home care services.     Home care to see patient within 24-48 hrs    Edy Fischer RN, BSN CTN  [facility] [phone]

## 2022-11-23 NOTE — H&P
Hospital Medicine History & Physical      PCP: [facility]ick [facility]    Date of Admission: 11/22/2022    Date of Service: Pt seen/examined on 11/22/2022 and Admitted to Inpatient     Chief Complaint:  buttock ulcer      History Of Present Illness: The patient is a 45 y.o. female who presents to [facility] with PMHx: CVA, CHF, COPD oxygen dependent, DM, chronic edema, chronic lower extremity ulcers, HTN, HLD    Lives at home  Oxygen therapy typically 2 L nasal cannula  CODE STATUS: Full      Seen at [facility] ED yesterday, admitted to [facility]-> scheduled for OR this AM and left AMA. Represented to the ED wanting to be admitted here. Patient reports that she has had a buttock ulcer developing over the course of the last couple of months. States that she sits in a chair and has felt like there is some kind of pressure on the buttock being created from a chair. She has not sought any prior medical treatment for this ulcer/decubitus that has been developing. She denies fever. Denies chills. Chronic bilateral lower extremity ulcers and wound she states that she manages at home by herself. She has been affiliated with [facility] wound care clinic but has not been there in quite some time. Past Medical History:        Diagnosis Date    Anemia     Asthma     Cerebral artery occlusion with cerebral infarction (HCC)     CHF (congestive heart failure) (HCC)     COPD (chronic obstructive pulmonary disease) ([address]     Diabetes ([facility] [address]     Edema     Foot ulcer ([facility] [address]     HTN (hypertension), benign 11/19/2013    Hyperlipidemia        Past Surgical History:        Procedure Laterality Date    HERNIA REPAIR      TUBAL LIGATION         Medications Prior to Admission:    Prior to Admission medications    Medication Sig Start Date End Date Taking?  Authorizing Provider   Dulaglutide (TRULICITY) 0.75 MG/0.5ML SOPN Inject 0.75 mg into the skin once a week   Yes Historical Provider, MD   tiotropium-olodaterol (STIOLTO) 2.5-2.5 MCG/ACT AERS Inhale 1 puff into the lungs in the morning and at bedtime 3/10/21   Historical Provider, MD   olmesartan (BENICAR) 40 MG tablet Take 1 tablet by mouth daily 10/31/22   Historical Provider, MD   torsemide (DEMADEX) 20 MG tablet Take 1 tablet by mouth daily 10/31/22   Historical Provider, MD   levothyroxine (SYNTHROID) 25 MCG tablet Take 1 tablet by mouth every morning (before breakfast) 10/17/22   Historical Provider, MD   albuterol sulfate HFA (PROVENTIL;VENTOLIN;PROAIR) 108 (90 Base) MCG/ACT inhaler Inhale 1 puff into the lungs every 4 hours as needed 12/7/17   Historical Provider, MD   insulin glargine (LANTUS SOLOSTAR) 100 UNIT/ML injection pen Inject 20 Units into the skin every morning (before breakfast) 3/20/21   Historical Provider, MD   atorvastatin (LIPITOR) 40 MG tablet Take 1 tablet by mouth nightly 8/26/22   Historical Provider, MD   loratadine (CLARITIN) 10 MG tablet Take 1 tablet by mouth daily as needed 7/1/22   Historical Provider, MD   insulin lispro (HUMALOG) 100 UNIT/ML SOLN injection vial Inject 7 Units into the skin 3 times daily (before meals) 6/15/22   Historical Provider, MD   carvedilol (COREG) 6.25 MG tablet Take 2 tablets by mouth 2 times daily (with meals) 1/18/22   Historical Provider, MD   methadone (DOLOPHINE) 10 MG tablet Take 40 mg by mouth daily. Historical Provider, MD       Allergies:  Codeine and Pcn [penicillins]    Social History:  The patient currently lives at home    TOBACCO:   reports that she has quit smoking. Her smoking use included cigarettes. She has a 25.00 pack-year smoking history. She has never used smokeless tobacco.  ETOH:   reports no history of alcohol use. Family History:  Reviewed in detail and negative for DM, Early CAD, Cancer, CVA.  Positive as follows:        Problem Relation Age of Onset    COPD Mother Early Death Brother     Cancer Maternal Aunt     Cancer Maternal Grandmother        REVIEW OF SYSTEMS: as noted in the HPI. All other systems reviewed and negative. PHYSICAL EXAM:    BP (!) 182/93   Pulse 69   Temp 97.9 °F (36.6 °C) (Oral)   Resp 18   Ht 5' 3\" (1.6 m)   Wt 192 lb 14.4 oz (87.5 kg)   SpO2 91%   BMI 34.17 kg/m²     General appearance: No apparent distress appears much older than stated age and chronically ill. She is cooperative. HEENT normocephalic atraumatic: PERRLA. Neck: Soft, supple, no pain  Lungs: Respirations easy regular nonlabored: Oxygen therapy 2 L nasal cannula in place. Speaks in full sentences  Heart: Regular rate and rhythm. No murmur rubs or gallop    Extremities: No clubbing, cyanosis, bilateral lower extremity edema 1-2+ pitting. Skin: Patient has dressings in place to the bilateral lower extremities therefore I cannot evaluate the chronic skin ulcers, there is evidence of hemosiderin and chronic edema. I did not actually evaluate the buttock ulcer, but this picture listed below was taken within the last 24 hours. There is evidence of demarcation, satellite lesions developing, eschar. Neurologic: Alert and oriented X 3, neurovascularly intact with sensory/motor intact upper extremities/lower extremities, bilaterally. Cranial nerves: II-XII intact, grossly non-focal.  Mental status: Alert, oriented, thought content appropriate.   Capillary Refill: Acceptable  < 3 seconds  Peripheral Pulses: +3 Easily felt, not easily obliterated with pressure        CXR:  I have reviewed the CXR with the following interpretation: N/A  EKG:  I have reviewed the EKG with the following interpretation: N/A    CBC   Recent Labs     11/22/22  0120 11/22/22  1749   WBC 6.1 5.9   HGB 16.6* 15.8   HCT 56.9* 56.3*    163      RENAL  Recent Labs     11/22/22  0140 11/22/22  1749    137   K 5.0 4.4   CL 97* 94*   CO2 33* 36*   BUN 23* 18   CREATININE 0.8 0.7     LFT'S  Recent Labs     11/22/22  0140 11/22/22  1749   AST 15 16   ALT 8* 12   BILITOT 1.0 0.8   ALKPHOS 119 97     COAG  No results for input(s): INR in the last 72 hours. CARDIAC ENZYMES  No results for input(s): CKTOTAL, CKMB, CKMBINDEX, TROPONINI in the last 72 hours. U/A:    Lab Results   Component Value Date/Time    COLORU Yellow 11/22/2022 01:20 AM    WBCUA 0-2 11/22/2022 01:20 AM    RBCUA 0-2 11/22/2022 01:20 AM    BACTERIA Rare 11/22/2022 01:20 AM    CLARITYU Clear 11/22/2022 01:20 AM    SPECGRAV 1.015 11/22/2022 01:20 AM    LEUKOCYTESUR Negative 11/22/2022 01:20 AM    BLOODU Negative 11/22/2022 01:20 AM    GLUCOSEU Negative 11/22/2022 01:20 AM    GLUCOSEU 250 06/24/2010 04:50 AM       ABG    Lab Results   Component Value Date/Time    BWO0XCM 35.4 09/05/2015 12:30 AM    BEART 8.0 09/05/2015 12:30 AM    N3QSRKOQ 82.0 09/05/2015 12:30 AM    PHART 7.397 09/05/2015 12:30 AM    THGBART 16.2 06/26/2010 05:43 AM    ETG1VYC 57.5 09/05/2015 12:30 AM    PO2ART 48.0 09/05/2015 12:30 AM    XUC4ABQ 37.0 09/05/2015 12:30 AM           Active Hospital Problems    Diagnosis Date Noted    Decubitus ulcer, buttock [T47.372] 11/22/2022     Priority: Medium           PHYSICIANS CERTIFICATION:    I certify that Analia Saldana is expected to be hospitalized for more than 2 midnights based on the following assessment and plan:      ASSESSMENT/PLAN:    Decubitus ulcer: Buttock, stage III-IV, eschar, satellite lesion noting  Dry gangrene, necrotizing fasciitis, MRSA, strep, Klebsiella or pseudomonal organisms certainly possible  Continue vancomycin and cefepime  General surgery consult  Wound care consult  Currently afebrile without tachycardia or hypotension, WBC 5.9  Initiate probiotic    Chronic lower extremity wounds and ulcers: Secondary to edema,?   PVD, chronic edema  Wound care consult    DM: Continue nightly Lantus, Accu-Cheks, diabetic diet, hypoglycemic protocol    HTN, HLD: Continue statin, losartan, Demadex, carvedilol per outpatient regimen    Chronic pain: Continue home methadone dosing    Hypothyroid: Continue levothyroxine    COPD: Oxygen dependent: Continue albuterol inhaler and tiotropium inhaler, oxygen therapy to maintain saturation greater than 90%    DVT Prophylaxis: Lovenox  Diet: Diet NPO  Code Status: Full Code  PT/OT Eval Status:     Dispo -admit, inpatient       Rand Rivas, APRN - CNP    Thank you [facility]an for the opportunity to be involved in this patient's care. If you have any questions or concerns please feel free to contact me at [phone]. This dictation was performed with a verbal recognition program (DRAGON) and it was checked for errors. It is possible that there are still dictated errors within this office note. If so, please bring any errors to my attention for an addendum. All efforts were made to ensure that this office note is accurate.

## 2022-11-23 NOTE — DISCHARGE SUMMARY
Hospital Medicine Discharge Summary    Patient: Mihai Bess     Gender: female  : 1970   Age: 45 y.o. MRN: [id number]    Code Status: Full Code     Primary Care Provider: Karina Silvestre Vick Direct    Admit Date: 2022   Discharge Date:   2022    Admitting Physician: Lois Glaser MD  Discharge Physician: Darrell Trujillo DO     Discharge Diagnoses: Active Hospital Problems    Diagnosis Date Noted    Decubitus ulcer, buttock [L89.309] 2022     Priority: Medium       Hospital Course:   45 y.o. female with PMHx: CVA, CHF, COPD oxygen dependent 2L, DM, chronic edema, chronic lower extremity ulcers, HTN, HLD, was admitted with a sacral ulcer. Seen at [facility] ED , admitted to [place]-> scheduled for OR  and left AMA. She then came to [address] [place] [address],[address] ed for admission. She has Chronic bilateral lower extremity ulcers that she manages at home by herself. She has been affiliated with [facility] wound care clinic but has not been there in quite some time. Work up completed, and improved with treatment as below. was discharged today in stable condition. Decubitus ulcer: Buttock, stage III-IV, eschar, satellite lesion   Dry gangrene - no signs of sepsis  - mrsa probe negative  - wound cultures from [facility]  with light growth s aureus, rare growth e faecalis. - crp only 12.7 and esr not elevated- likely chronic wound per surgery  - vanc, cefepime - has received 2 doses vanc since admission to [facility]. Will discharge with linezolid here  - surgery consulted, no need to acute surgical intervention, had bedside debridement here. ok to dc and follow up in the wound care clinic. She is not bed bound and is able to walk independently. Pt ,ot evaluated her and deemed her to stable to require rehab/anf.       Chronic lower extremity wounds and ulcers: Secondary to chronic venous stasis and lymphedema, PVD  Wound care consulted     Chronic conditions - continue home meds unless otherwise stated  Cerebral atherosclerosis   dm  Anemia  Chf  Copd    Disposition:  Home    Exam:     /68   Pulse 74   Temp 97.7 °F (36.5 °C) (Oral)   Resp 16   Ht 5' 3\" (1.6 m)   Wt 191 lb 5.8 oz (86.8 kg)   SpO2 92%   BMI 33.90 kg/m²     General appearance:  No apparent distress, appears stated age and cooperative. HEENT:  Normal cephalic, atraumatic without obvious deformity. Pupils equal, round, and reactive to light. Extra ocular muscles intact. Conjunctivae/corneas clear. Neck: Supple, with full range of motion. No jugular venous distention. Trachea midline. Respiratory:  Normal respiratory effort. Clear to auscultation, bilaterally without Rales/Wheezes/Rhonchi. Cardiovascular:  Regular rate and rhythm with normal S1/S2 without murmurs, rubs or gallops. Abdomen: Soft, non-tender, non-distended with normal bowel sounds. Musculoskeletal:  No clubbing, cyanosis or edema bilaterally. Skin: Skin color, texture, turgor normal.  Sacral wound without drainage, no cellulitis. Neurologic:  Neurovascularly intact without any focal sensory/motor deficits. Cranial nerves: II-XII intact, grossly non-focal.  Psychiatric:  Alert and oriented, thought content appropriate, normal insight    Consults:     IP CONSULT TO HOSPITALIST  IP CONSULT TO SOCIAL WORK  IP CONSULT TO GENERAL SURGERY  IP CONSULT TO PHARMACY  IP CONSULT TO HOME CARE NEEDS    Labs:  For convenience and continuity at follow-up the following most recent labs are provided:    Lab Results   Component Value Date/Time    WBC 5.5 11/23/2022 05:34 AM    HGB 15.9 11/23/2022 05:34 AM    HCT 56.5 11/23/2022 05:34 AM    MCV 74.4 11/23/2022 05:34 AM     11/23/2022 05:34 AM     11/23/2022 05:34 AM    K 4.8 11/23/2022 05:34 AM    CL 95 11/23/2022 05:34 AM    CO2 35 11/23/2022 05:34 AM    BUN 20 11/23/2022 05:34 AM    CREATININE 0.8 11/23/2022 05:34 AM    CALCIUM 9.0 11/23/2022 05:34 AM    PHOS 1.9 06/26/2010 04:05 AM    BNP 42 10/14/2013 01:05 PM ALKPHOS 97 11/22/2022 05:49 PM    ALT 12 11/22/2022 05:49 PM    AST 16 11/22/2022 05:49 PM    BILITOT 0.8 11/22/2022 05:49 PM    LABALBU 3.3 11/22/2022 05:49 PM    LDLCALC 90 11/18/2013 05:48 AM    TRIG 131 11/18/2013 05:48 AM     Lab Results   Component Value Date    INR 1.01 10/14/2013    INR 1.41 (H) 06/24/2010       Radiology:  No orders to display       Discharge Medications:   Current Discharge Medication List        START taking these medications    Details   linezolid (ZYVOX) 600 MG tablet Take 1 tablet by mouth 2 times daily for 10 days  Qty: 20 tablet, Refills: 0      saccharomyces boulardii (FLORASTOR) 250 MG capsule Take 1 capsule by mouth 2 times daily for 14 days  Qty: 28 capsule, Refills: 0           Current Discharge Medication List        Current Discharge Medication List        CONTINUE these medications which have NOT CHANGED    Details   Dulaglutide (TRULICITY) 1.94 OP/6.8ZN SOPN Inject 0.75 mg into the skin once a week      tiotropium-olodaterol (STIOLTO) 2.5-2.5 MCG/ACT AERS Inhale 1 puff into the lungs in the morning and at bedtime      olmesartan (BENICAR) 40 MG tablet Take 1 tablet by mouth daily      torsemide (DEMADEX) 20 MG tablet Take 1 tablet by mouth daily      levothyroxine (SYNTHROID) 25 MCG tablet Take 1 tablet by mouth every morning (before breakfast)      albuterol sulfate HFA (PROVENTIL;VENTOLIN;PROAIR) 108 (90 Base) MCG/ACT inhaler Inhale 1 puff into the lungs every 4 hours as needed      insulin glargine (LANTUS SOLOSTAR) 100 UNIT/ML injection pen Inject 20 Units into the skin every morning (before breakfast)      atorvastatin (LIPITOR) 40 MG tablet Take 1 tablet by mouth nightly      loratadine (CLARITIN) 10 MG tablet Take 1 tablet by mouth daily as needed      insulin lispro (HUMALOG) 100 UNIT/ML SOLN injection vial Inject 7 Units into the skin 3 times daily (before meals)      carvedilol (COREG) 6.25 MG tablet Take 2 tablets by mouth 2 times daily (with meals) methadone (DOLOPHINE) 10 MG tablet Take 40 mg by mouth daily. Current Discharge Medication List          Follow-up appointments:  one week    Provider Follow-up:    Pcp, surgery, wound care clinic    Condition at Discharge:  Stable    The patient was seen and examined on day of discharge and this discharge summary is in conjunction with any daily progress note from day of discharge. Time Spent on discharge is 45 minutes  in the examination, evaluation, counseling and review of medications and discharge plan. Signed:    Noemi Head DO   11/23/2022      Thank you [facility] for the opportunity to be involved in this patient's care. If you have any questions or concerns please feel free to contact me at [phone].

## 2022-11-23 NOTE — PROGRESS NOTES
Pharmacy Medication Reconciliation Note     List of medications Elif Garcia is currently taking is complete. Source of information:   1. Conversation with patient at bedside  2. EMR    Notes regarding home medications:   1. Patient reports taking all AM meds PTA in the ED  2. Unable to verify methadone dose  3. No recent fill history for carvedilol, but reports taking BID  4.  Reports NOT taking sertraline even though it was just filled this month     Patient denies taking any OTC or herbal medications    Brett Eddy, Pharmacy Intern  11/22/2022  7:56 PM
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
## 2022-11-23 NOTE — PROGRESS NOTES
Physical Therapy  No eval/discharge  Gely Foss  [id number]    PT orders received for this pt admitted with non-healing wound on her buttocks. The pt was found to be fully dressed and standing at her bedside table upon arrival to the room. The pt reports she does not have any PT needs and that she has been getting up on her own to and from the bathroom w/o difficulty. The pt also reports she is able to manage her O2 line safely on her own and she does not have equipment needs. Will d/c from acute care PT services due to no needs.    Electronically signed by Opal Burris, PT [id number] on 11/23/2022 at 1:13 PM

## 2022-11-23 NOTE — ACP (ADVANCE CARE PLANNING)
11/23 Chart reviewed. Patient was seen in [place] ER and transferred to [facility]. She left [facility] and came to [facility] to [facility]. Surg for Buttock wound. PT/OT. Plan TBD. Electronically signed by Nam Ring RN on 11/23/2022 at 8:56 AM

## 2022-11-23 NOTE — PROGRESS NOTES
Nutrition Education    Educated on [employer]  Learners: Patient  Readiness: Eager  Method: Explanation and handouts  Response: Verbalizes Understanding  Contact name and number provided. Heart Failure Diet Education:    Per hospital protocol or consult, patient being seen for Heart Failure diet guidelines. Learners: [x]Patient []Family []Caregiver [] Other: ______________  Methods: [x]Verbal Education  [x]Handouts  []Teachback     Patient's Lifestyle Questions:   Is HF a new Diagnosis? []Yes [x]No    Has patient had prior education? []Yes [x]No      Instructed the Patient on:   [x] High/Low Sodium foods    [] Moderation/portion control  [] Eating out  [] Fluid Restriction    [] Label reading  [x] Carb Counting   [] Other:      Educational Materials Provided:   [x] Nutrition Care Manual (NCM) Heart Failure Nutrition Therapy   [x] NCM Sodium (Salt) Content of Foods  [] NCM Sodium Free Flavoring Tips    [] Heart Healthy Eating Label Reading Tips   [] Healthy Eating when Eating Out  [] Controlling Your Fluids   [] Fast Food Guide (from [employer])  [x] NCM Carbohydrate Counting for People with Diabetes   [] Managing Your Diabetes Plate Method     -Diet Recommendations: 2000 mg Sodium/day, Fluid restriction was not ordered         Monitoring/Evaluation:   -Barriers: family prepares meals  -Evaluation of education: Indicates understanding.  -Expected compliance: good.         Total time involved in patient education: 20 minutes        Electronically signed by Melina Nolen RD, LD on 11/23/2022 at 1:19 PM               Melina Nolen RD, LD  Contact Number: [phone]

## 2022-11-23 NOTE — PROGRESS NOTES
CLINICAL PHARMACY NOTE: MEDS TO BEDS    Total # of Prescriptions Filled: 2   The following medications were delivered to the patient:  Linezolid  probotic    Additional Documentation:  Tubed to Janel Lorenzo

## 2022-11-23 NOTE — CONSULTS
Clinical Pharmacy Note  Vancomycin Consult    Zeenat Altamirano is a 45 y.o. female ordered Vancomycin for ducubitus ulcer; consult received from [facility] to manage therapy. Also receiving cefepime. Allergies:  Codeine and Pcn [penicillins]     Temp max:  Temp (24hrs), Av.1 °F (36.7 °C), Min:97.9 °F (36.6 °C), Max:98.5 °F (36.9 °C)      Recent Labs     22  0120 22  1749   WBC 6.1 5.9       Recent Labs     22  0140 22  1749   BUN 23* 18   CREATININE 0.8 0.7       No intake or output data in the 24 hours ending 22 0002    Culture Results:      Ht Readings from Last 1 Encounters:   22 5' 3\" (1.6 m)        Wt Readings from Last 1 Encounters:   22 192 lb 14.4 oz (87.5 kg)         Estimated Creatinine Clearance: 96 mL/min (based on SCr of 0.7 mg/dL). Assessment/Plan:  Day # 1 of vancomycin. Vancomycin 1250 mg IV every 12 hours ordered. Goal -600  Predicted     Thank you for the consult.    Kendra Montilla, PharmD

## 2022-11-23 NOTE — ED NOTES
Report given to Steven Flores. SBAR discussed. All questions answered. RN verbalized understanding.       Lizzette Beckford RN  11/22/22 4401

## 2022-11-23 NOTE — H&P
Pt has unstageable wound @ L buttocks. Educated pt on importance of putting a dressing on her wound. Attempted to put abd dressing with skin tape or a mepilex, but pt refused. Pt stated that her doctor told her not to put any tape on her skin.  Abd pad placed, but with no adhesive

## 2022-11-23 NOTE — PLAN OF CARE
Problem: Discharge Planning  Goal: Discharge to home or other facility with appropriate resources  Outcome: Progressing     Problem: Safety - Adult  Goal: Free from fall injury  Outcome: Progressing     Problem: ABCDS Injury Assessment  Goal: Absence of physical injury  Outcome: Progressing     Problem: Respiratory - Adult  Goal: Achieves optimal ventilation and oxygenation  Outcome: Progressing     Problem: Skin/Tissue Integrity - Adult  Goal: Skin integrity remains intact  Outcome: Progressing  Goal: Incisions, wounds, or drain sites healing without S/S of infection  Outcome: Progressing     Problem: Musculoskeletal - Adult  Goal: Return mobility to safest level of function  Outcome: Progressing  Goal: Maintain proper alignment of affected body part  Outcome: Progressing  Goal: Return ADL status to a safe level of function  Outcome: Progressing     Problem: Infection - Adult  Goal: Absence of infection at discharge  Outcome: Progressing  Goal: Absence of infection during hospitalization  Outcome: Progressing  Goal: Absence of fever/infection during anticipated neutropenic period  Outcome: Progressing     Problem: Metabolic/Fluid and Electrolytes - Adult  Goal: Electrolytes maintained within normal limits  Outcome: Progressing  Goal: Hemodynamic stability and optimal renal function maintained  Outcome: Progressing  Goal: Glucose maintained within prescribed range  Outcome: Progressing

## 2022-11-23 NOTE — CARE COORDINATION
The Plan for Transition of Care is related to the following treatment goals: Wound healing/Buttocks Decubitus    The Patient was provided with a choice of provider and agrees   with the discharge plan. [x] Yes [] No    Freedom of choice list was provided with basic dialogue that supports the patient's individualized plan of care/goals, treatment preferences and shares the quality data associated with the providers. [x] Yes [] No     Patient is agreeable to [address] [place] [address] for wound care. She has no preference for agency. Referral sent to [facility].  Electronically signed by Malia Staton RN on 11/23/2022 at 11:32 AM

## 2022-11-23 NOTE — PROGRESS NOTES
Patient discharged home per md order. Patient verbalizes understanding of all discharge instructions. All belongings with patient at discharge. IV removed intact.

## 2022-11-23 NOTE — CARE COORDINATION
[facility]    Referral received from CM to follow for home care services. I will follow for needs, and speak with patient to verify demos. Unable to send referral until confirmation of signing MD. Macie Gambino and spoke with Berry Lozada at [facility] primary care,  she will send msg to Dr Tony Aguirre, who is newly assigned PCP as Dr Julia Patterson has left the practice. Waiting response. CM notified.     Surgical team has agreed to sign for [facility] [place]. until patient has follow up with wound clinic      Gwendolyn Monday RN, BSN [address] [facility] [phone]

## 2022-11-23 NOTE — CARE COORDINATION
[facility] Wound Ostomy Continence Nurse  Consult Note       NAME:  Analia Saldana  MEDICAL RECORD NUMBER:  [id number]  AGE: 45 y.o. GENDER: female  : 1970  TODAY'S DATE:  2022    Subjective   Reason for WOCN Evaluation and Assessment: unstageable to right buttock and BLE wounds-all POA      Analia Saldana is a 45 y.o. female referred by:   [] Physician  [x] Nursing  [] Other:     Wound Identification:  Wound Type: venous, diabetic, and pressure  Contributing Factors: venous stasis, diabetes, and decreased mobility    Wound History: chronic wounds, pt tells me she has followed at [facility] wound clinic, but I do not see a note. The last visit note is from PCP in 2022. Was a no show 22 at [facility] internal med.    Current Wound Care Treatment:  ointment on her legs    Patient Goal of Care:  [x] Wound Healing  [] Odor Control  [] Palliative Care  [] Pain Control   [] Other:         PAST MEDICAL HISTORY        Diagnosis Date    Anemia     Asthma     Cerebral artery occlusion with cerebral infarction (HCC)     CHF (congestive heart failure) (HCC)     COPD (chronic obstructive pulmonary disease) [facility]     Diabetes ([facility]     Edema     Foot ulcer (HCC)     HTN (hypertension), benign 2013    Hyperlipidemia        PAST SURGICAL HISTORY    Past Surgical History:   Procedure Laterality Date    HERNIA REPAIR      TUBAL LIGATION         FAMILY HISTORY    Family History   Problem Relation Age of Onset    COPD Mother     Early Death Brother     Cancer Maternal Aunt     Cancer Maternal Grandmother        SOCIAL HISTORY    Social History     Tobacco Use    Smoking status: Former     Packs/day: 1.00     Years: 25.00     Pack years: 25.00     Types: Cigarettes    Smokeless tobacco: Never    Tobacco comments:     quit 3 weeks ago   Substance Use Topics    Alcohol use: No    Drug use: No       ALLERGIES    Allergies   Allergen Reactions    Codeine     Pcn [Penicillins]        MEDICATIONS    No current facility-administered medications on file prior to encounter. Current Outpatient Medications on File Prior to Encounter   Medication Sig Dispense Refill    Dulaglutide (TRULICITY) 1.66 HV/4.5TF SOPN Inject 0.75 mg into the skin once a week      tiotropium-olodaterol (STIOLTO) 2.5-2.5 MCG/ACT AERS Inhale 1 puff into the lungs in the morning and at bedtime      olmesartan (BENICAR) 40 MG tablet Take 1 tablet by mouth daily      torsemide (DEMADEX) 20 MG tablet Take 1 tablet by mouth daily      levothyroxine (SYNTHROID) 25 MCG tablet Take 1 tablet by mouth every morning (before breakfast)      albuterol sulfate HFA (PROVENTIL;VENTOLIN;PROAIR) 108 (90 Base) MCG/ACT inhaler Inhale 1 puff into the lungs every 4 hours as needed      insulin glargine (LANTUS SOLOSTAR) 100 UNIT/ML injection pen Inject 20 Units into the skin every morning (before breakfast)      atorvastatin (LIPITOR) 40 MG tablet Take 1 tablet by mouth nightly      loratadine (CLARITIN) 10 MG tablet Take 1 tablet by mouth daily as needed      insulin lispro (HUMALOG) 100 UNIT/ML SOLN injection vial Inject 7 Units into the skin 3 times daily (before meals)      carvedilol (COREG) 6.25 MG tablet Take 2 tablets by mouth 2 times daily (with meals)      methadone (DOLOPHINE) 10 MG tablet Take 40 mg by mouth daily.          Objective    /68   Pulse 74   Temp 97.7 °F (36.5 °C) (Oral)   Resp 16   Ht 5' 3\" (1.6 m)   Wt 191 lb 5.8 oz (86.8 kg)   SpO2 92%   BMI 33.90 kg/m²     LABS:  WBC:    Lab Results   Component Value Date/Time    WBC 5.5 11/23/2022 05:34 AM     H/H:    Lab Results   Component Value Date/Time    HGB 15.9 11/23/2022 05:34 AM    HCT 56.5 11/23/2022 05:34 AM     PTT:    Lab Results   Component Value Date/Time    APTT 31.7 10/14/2013 01:05 PM   [APTT}  PT/INR:    Lab Results   Component Value Date/Time    PROTIME 11.3 10/14/2013 01:05 PM    INR 1.01 10/14/2013 01:05 PM     HgBA1c:    Lab Results   Component Value Date/Time    LABA1C 10.4 09/07/2015 05:32 AM       Assessment   Jose Risk Score: Jose Scale Score: 18    Patient Active Problem List   Diagnosis Code    Vision disturbance following cerebrovascular accident I69.398, H53.9    DMII (diabetes mellitus, type 2) (HCC) E11.9    HTN (hypertension), benign I10    Decubitus ulcer of buttock, right, unstageable (HCC) L89.310    Cellulitis of buttock L03.317    Cellulitis and abscess of buttock L02.31, L03.317    Decubitus ulcer, buttock L89.309       Measurements:      Wound 11/23/22 Pretibial Proximal;Right (Active)   Wound Image   11/23/22 1100   Wound Etiology Venous 11/23/22 1100   Dressing Status New dressing applied;Clean;Dry; Intact 11/23/22 1100   Wound Cleansed Cleansed with saline 11/23/22 1100   Dressing/Treatment Hydrofiber Ag;Roll gauze; Ace wrap 11/23/22 1100   Dressing Change Due 11/26/22 11/23/22 1100   Wound Length (cm) 0.5 cm 11/23/22 1100   Wound Width (cm) 1 cm 11/23/22 1100   Wound Depth (cm) 0.1 cm 11/23/22 1100   Wound Surface Area (cm^2) 0.5 cm^2 11/23/22 1100   Change in Wound Size % (l*w) 87.5 11/23/22 1100   Wound Volume (cm^3) 0.05 cm^3 11/23/22 1100   Wound Assessment Pink/red 11/23/22 1100   Drainage Amount Scant 11/23/22 1100   Drainage Description Serosanguinous 11/23/22 1100   Odor None 11/23/22 1100   Lyssa-wound Assessment Hemosiderin staining (brown yellow) 11/23/22 1100   Margins Undefined edges 11/23/22 1100   Wound Thickness Description not for Pressure Injury Partial thickness 11/23/22 1100   Number of days: 0         Wound 11/23/22 Pretibial Left;Proximal (Active)   Wound Image   11/23/22 1100   Wound Etiology Venous 11/23/22 1100   Dressing Status New dressing applied;Clean;Dry; Intact 11/23/22 1100   Wound Cleansed Cleansed with saline 11/23/22 1100   Dressing/Treatment Hydrofiber Ag;Roll gauze; Ace wrap 11/23/22 1100   Dressing Change Due 11/26/22 11/23/22 1100   Wound Length (cm) 1 cm 11/23/22 1100   Wound Width (cm) 1 cm 11/23/22 1100   Wound Depth (cm) 0.1 cm 11/23/22 1100   Wound Surface Area (cm^2) 1 cm^2 11/23/22 1100   Change in Wound Size % (l*w) 75 11/23/22 1100   Wound Volume (cm^3) 0.1 cm^3 11/23/22 1100   Wound Assessment Pink/red 11/23/22 1100   Drainage Amount Scant 11/23/22 1100   Drainage Description Serosanguinous 11/23/22 1100   Odor None 11/23/22 1100   Lyssa-wound Assessment Hemosiderin staining (brown yellow) 11/23/22 1100   Margins Defined edges 11/23/22 1100   Wound Thickness Description not for Pressure Injury Partial thickness 11/23/22 1100   Number of days: 0         Wound 11/23/22 Buttocks Right (Active)   Wound Image   11/23/22 1100   Wound Etiology Pressure Stage 3 11/23/22 1100   Dressing Status New dressing applied;Clean;Dry; Intact 11/23/22 1100   Wound Cleansed Cleansed with saline 11/23/22 1100   Dressing/Treatment Moist to moist 11/23/22 1100   Dressing Change Due 11/24/22 11/23/22 1100   Wound Length (cm) 6 cm 11/23/22 1100   Wound Width (cm) 4.5 cm 11/23/22 1100   Wound Depth (cm) 1 cm 11/23/22 1100   Wound Surface Area (cm^2) 27 cm^2 11/23/22 1100   Change in Wound Size % (l*w) -12.5 11/23/22 1100   Wound Volume (cm^3) 27 cm^3 11/23/22 1100   Wound Assessment Pink/red, slough 11/23/22 1100   Drainage Amount Small 11/23/22 1100   Drainage Description Sanguinous 11/23/22 1100   Odor None 11/23/22 1100   Lyssa-wound Assessment Intact 11/23/22 1100   Number of days: 0         Wound 11/23/22 Toe (Comment  which one) Anterior; Left 4th toe (Active)   Wound Image   11/23/22 1100   Wound Etiology Diabetic 11/23/22 1100   Dressing Status New dressing applied;Clean;Dry; Intact 11/23/22 1100   Wound Cleansed Cleansed with saline 11/23/22 1100   Dressing/Treatment Hydrofiber Ag;Roll gauze; Ace wrap 11/23/22 1100   Dressing Change Due 11/26/22 11/23/22 1100   Wound Length (cm) 0.5 cm 11/23/22 1100   Wound Width (cm) 0.7 cm 11/23/22 1100   Wound Depth (cm) 0.1 cm 11/23/22 1100   Wound Surface Area (cm^2) 0.35 cm^2 11/23/22 1100   Change in Wound Size % (l*w) 65 11/23/22 1100   Wound Volume (cm^3) 0.035 cm^3 11/23/22 1100   Wound Assessment Pink/red;Dry 11/23/22 1100   Drainage Amount None 11/23/22 1100   Odor None 11/23/22 1100   Number of days: 0      Pt seen with NP. Right buttock wound being debrided. Moist saline dressing applied. Pt agreeable to go to wound clinic here. OK with NP for pt to go home with saline dressings and follow up at clinic. BLE with venous ulcers and hemosiderin staining. Left 5th toe amputated, 4th toe with diabetic ulcer. Wounds cleansed with saline. Blue silicone lotion applied. Damp Aquacel ag applied to wounds and legs wrapped with roll gauze and ace wraps. Response to treatment:  Well tolerated by patient. Pain Assessment:  Severity:  0 / 10  Quality of pain: N/A    Plan   Plan of Care:   -Right buttock - moist saline dressing daily  -BLE-cleanse with saline. Lightly dampen Aquacel ag and apply to wounds on BLE. Wrap with roll gauze and ace wraps from toes to knees. Specialty Bed Required : No   [] Low Air Loss   [] Pressure Redistribution  [] Fluid Immersion  [] Bariatric  [] Total Pressure Relief  [] Other:     Current Diet: ADULT DIET;  Regular; 5 carb choices (75 gm/meal)  Dietician consult:  Yes    Discharge Plan:  Placement for patient upon discharge: home with support    Patient appropriate for Outpatient [address]: Yes-pt agreeable to go to clinic here    Referrals:  [x]   [] [facility]  [] Supplies  [] Other    Patient/Caregiver Teaching:  Level of patient/caregiver understanding able to:   [] Indicates understanding       [] Needs reinforcement  [] Unsuccessful      [x] Verbal Understanding  [] Demonstrated understanding       [] No evidence of learning  [] Refused teaching         [] N/A       Electronically signed by TENZIN Akhtar on 11/23/2022 at 12:26 PM

## 2022-11-23 NOTE — CARE COORDINATION
DISCHARGE SUMMARY     DATE OF DISCHARGE: 11/23/22    DISCHARGE DESTINATION: Home    HOME CARE: Yes  Discharging to Facility/ Agency   Name:  [facility] care   Address: [address] [place] [address]., [address] [place] [address]  Phone: [phone]  Fax: [phone]      HEMODIALYSIS: No    TRANSPORTATION: Private Car    NEW DME ORDERED: no    COMMENTS: Discharge to home with [facility].  Wound Care Clinic Information is on [address] [place] [address] for patient follow-up at [address] [place] [address] on Monday 11/28/22 at 2:15 PM. Electronically signed by Tereza Bello RN on 11/23/2022 at 12:55 PM

## 2022-11-23 NOTE — PROGRESS NOTES
Occupational Therapy  No Eval/Discharge    Flash Marga  11/23/2022    OT orders received and chart reviewed. OT to pt's room for eval. Pt found to be UAL fully dressed for discharge on arrival, reports she is being discharged home and denies any therapy needs. Pt reports UAL in room completing ADLs and fxl mobility independently. No acute OT services indicated, will sign off.     Susan Amaral, OTR/L [id number]

## 2022-11-23 NOTE — CARE COORDINATION
11/23/22 0831   Readmission Assessment   Number of Days since last admission? 1-7 days   Previous Disposition Home with Family   Who is being Interviewed Patient   What was the patient's/caregiver's perception as to why they think they needed to return back to the hospital? [place] discharge on prior admission   Did you visit your Primary Care Physician after you left the hospital, before you returned this time? No   Why weren't you able to visit your PCP? Other (Comment)  (left AMA)   Did you see a specialist, such as Cardiac, Pulmonary, Orthopedic Physician, etc. after you left the hospital? No   Who advised the patient to return to the hospital? Self-referral   Does the patient report anything that got in the way of taking their medications? No   In our efforts to provide the best possible care to you and others like you, can you think of anything that we could have done to help you after you left the hospital the first time, so that you might not have needed to return so soon?  Other (Comment)  (left ama)

## 2022-11-23 NOTE — DISCHARGE INSTR - COC
Continuity of Care Form    Patient Name: Effie Wolf   :  1970  MRN:  [id number]    Admit date:  2022  Discharge date:  22    Code Status Order: Full Code   Advance Directives:     Admitting Physician:  Ramandeep Gibson MD  PCP: Karina Vick Direct    Discharging Nurse: Ortega Peña 23 Unit/Room#: J0O-1594/7643-22  Discharging Unit Phone Number: [phone]    Emergency Contact:   Extended Emergency Contact Information  Primary Emergency Contact: Lukasz Mcwilliams [address] Phone: [phone]  Relation: Child  Secondary Emergency Contact: zenaida vasquez  Home Phone: [phone]  Relation: Child    Past Surgical History:  Past Surgical History:   Procedure Laterality Date    HERNIA REPAIR      TUBAL LIGATION         Immunization History: There is no immunization history on file for this patient.     Active Problems:  Patient Active Problem List   Diagnosis Code    Vision disturbance following cerebrovascular accident I69.398, H53.9    DMII (diabetes mellitus, type 2) ([facility]) E11.9    HTN (hypertension), benign I10    Decubitus ulcer of buttock, right, unstageable ([facility]) L89.310    Cellulitis of buttock L03.317    Cellulitis and abscess of buttock L02.31, L03.317    Decubitus ulcer, buttock L89.309       Isolation/Infection:   Isolation            No Isolation          Patient Infection Status       None to display            Nurse Assessment:  Last Vital Signs: /68   Pulse 74   Temp 97.7 °F (36.5 °C) (Oral)   Resp 16   Ht 5' 3\" (1.6 m)   Wt 191 lb 5.8 oz (86.8 kg)   SpO2 92%   BMI 33.90 kg/m²     Last documented pain score (0-10 scale): Pain Level: 0  Last Weight:   Wt Readings from Last 1 Encounters:   22 191 lb 5.8 oz (86.8 kg)     Mental Status:  oriented and alert    IV Access:  - None    Nursing Mobility/ADLs:  Walking   Independent  Transfer  Independent  Bathing  Independent  Dressing  Independent  Toileting  Independent  Feeding Independent  Med Admin  Independent  Med Delivery   whole    Wound Care Documentation and Therapy:  Wound 11/23/22 Pretibial Proximal;Right (Active)   Wound Image   11/23/22 1100   Wound Etiology Venous 11/23/22 1100   Dressing Status New dressing applied;Clean;Dry; Intact 11/23/22 1100   Wound Cleansed Cleansed with saline 11/23/22 1100   Dressing/Treatment Hydrofiber Ag;Roll gauze; Ace wrap 11/23/22 1100   Dressing Change Due 11/26/22 11/23/22 1100   Wound Length (cm) 0.5 cm 11/23/22 1100   Wound Width (cm) 1 cm 11/23/22 1100   Wound Depth (cm) 0.1 cm 11/23/22 1100   Wound Surface Area (cm^2) 0.5 cm^2 11/23/22 1100   Change in Wound Size % (l*w) 87.5 11/23/22 1100   Wound Volume (cm^3) 0.05 cm^3 11/23/22 1100   Wound Assessment Pink/red 11/23/22 1100   Drainage Amount Scant 11/23/22 1100   Drainage Description Serosanguinous 11/23/22 1100   Odor None 11/23/22 1100   Lyssa-wound Assessment Hemosiderin staining (brown yellow) 11/23/22 1100   Margins Undefined edges 11/23/22 1100   Wound Thickness Description not for Pressure Injury Partial thickness 11/23/22 1100   Number of days: 0       Wound 11/23/22 Pretibial Left;Proximal (Active)   Wound Image   11/23/22 1100   Wound Etiology Venous 11/23/22 1100   Dressing Status New dressing applied;Clean;Dry; Intact 11/23/22 1100   Wound Cleansed Cleansed with saline 11/23/22 1100   Dressing/Treatment Hydrofiber Ag;Roll gauze; Ace wrap 11/23/22 1100   Dressing Change Due 11/26/22 11/23/22 1100   Wound Length (cm) 1 cm 11/23/22 1100   Wound Width (cm) 1 cm 11/23/22 1100   Wound Depth (cm) 0.1 cm 11/23/22 1100   Wound Surface Area (cm^2) 1 cm^2 11/23/22 1100   Change in Wound Size % (l*w) 75 11/23/22 1100   Wound Volume (cm^3) 0.1 cm^3 11/23/22 1100   Wound Assessment Pink/red 11/23/22 1100   Drainage Amount Scant 11/23/22 1100   Drainage Description Serosanguinous 11/23/22 1100   Odor None 11/23/22 1100   Lyssa-wound Assessment Hemosiderin staining (brown yellow) 11/23/22 1100 Margins Defined edges 11/23/22 1100   Wound Thickness Description not for Pressure Injury Partial thickness 11/23/22 1100   Number of days: 0       Wound 11/23/22 Buttocks Right (Active)   Wound Image   11/23/22 1100   Wound Etiology Pressure Stage 3 11/23/22 1100   Dressing Status New dressing applied;Clean;Dry; Intact 11/23/22 1100   Wound Cleansed Cleansed with saline 11/23/22 1100   Dressing/Treatment Moist to moist 11/23/22 1100   Dressing Change Due 11/24/22 11/23/22 1100   Wound Length (cm) 6 cm 11/23/22 1100   Wound Width (cm) 4.5 cm 11/23/22 1100   Wound Depth (cm) 1 cm 11/23/22 1100   Wound Surface Area (cm^2) 27 cm^2 11/23/22 1100   Change in Wound Size % (l*w) -12.5 11/23/22 1100   Wound Volume (cm^3) 27 cm^3 11/23/22 1100   Wound Assessment Pink/red;Slough 11/23/22 1100   Drainage Amount Small 11/23/22 1100   Drainage Description Sanguinous 11/23/22 1100   Odor None 11/23/22 1100   Lyssa-wound Assessment Intact 11/23/22 1100   Number of days: 0       Wound 11/23/22 Toe (Comment  which one) Anterior; Left 4th toe (Active)   Wound Image   11/23/22 1100   Wound Etiology Diabetic 11/23/22 1100   Dressing Status New dressing applied;Clean;Dry; Intact 11/23/22 1100   Wound Cleansed Cleansed with saline 11/23/22 1100   Dressing/Treatment Hydrofiber Ag;Roll gauze; Ace wrap 11/23/22 1100   Dressing Change Due 11/26/22 11/23/22 1100   Wound Length (cm) 0.5 cm 11/23/22 1100   Wound Width (cm) 0.7 cm 11/23/22 1100   Wound Depth (cm) 0.1 cm 11/23/22 1100   Wound Surface Area (cm^2) 0.35 cm^2 11/23/22 1100   Change in Wound Size % (l*w) 65 11/23/22 1100   Wound Volume (cm^3) 0.035 cm^3 11/23/22 1100   Wound Assessment Pink/red;Dry 11/23/22 1100   Drainage Amount None 11/23/22 1100   Odor None 11/23/22 1100   Lyssa-wound Assessment Dry/flaky; Blanchable erythema 11/23/22 1100   Number of days: 0   Wound Care:  -Right buttock - moist saline dressing daily-until seen by wound clinic  -BLE-cleanse with saline.  Lightly dampen Aquacel ag and apply to wounds on BLE. Wrap with roll gauze and ace wraps from toes to knees. Elimination:  Continence: Bowel: Yes  Bladder: Yes  Urinary Catheter: None   Colostomy/Ileostomy/Ileal Conduit: No       Date of Last BM:     Intake/Output Summary (Last 24 hours) at 11/23/2022 1249  Last data filed at 11/23/2022 1108  Gross per 24 hour   Intake 360 ml   Output --   Net 360 ml     I/O last 3 completed shifts: In: 120 [P.O.:120]  Out: -     Safety Concerns:     None    Impairments/Disabilities:      None    Nutrition Therapy:  Current Nutrition Therapy:   - Oral Diet:  General    Routes of Feeding: Oral  Liquids: Thin Liquids  Daily Fluid Restriction: no  Last Modified Barium Swallow with Video (Video Swallowing Test): not done    Treatments at the Time of Hospital Discharge:   Respiratory Treatments:   Oxygen Therapy:  is on oxygen at 3 L/min per nasal cannula. Ventilator:    - No ventilator support    Rehab Therapies: Wound Care  Weight Bearing Status/Restrictions: No weight bearing restrictions  Other Medical Equipment (for information only, NOT a DME order):     Other Treatments:     Patient's personal belongings (please select all that are sent with patient):  None    RN SIGNATURE:  Electronically signed by Nathen Terrazas RN on 11/23/22 at 2:25 PM EST    CASE MANAGEMENT/SOCIAL WORK SECTION    Inpatient Status Date: 11/22/22    Readmission Risk Assessment Score:  Readmission Risk              Risk of Unplanned Readmission:  11           Discharging to Facility/ Agency   Name: Discharging to Facility/ Agency   Name:  [facility] care    Address: [address] [place] [address], [address] [place] [address]  Phone: [phone]  Fax: [phone]      Dialysis Facility (if applicable)   Name:  Address:  Dialysis Schedule:  Phone:  Fax:    / signature: Electronically signed by Aby Vargas RN on 11/23/22 at 1:01 PM EST    PHYSICIAN SECTION    Prognosis: Good    Condition at Discharge: Stable    Rehab Potential (if transferring to Rehab): Good    Recommended Labs or Other Treatments After Discharge:   Wound location:   -Remove old packing or dressing from wound(s). Note drainage color/odor.   -Cleanse wound with normal saline. OK to shower. -Moisten fresh gauze with normal saline and squeeze until it is no longer dripping.   -fill the wound bed   -May use hydrogel or calcium alginate dressing and change dressing every 2-3 days.   -Cover wound with dry gauze, border gauze, ABD pad, or similar. Secure with paper tape. Change outer dressing as needed if it becomes saturated. Do not allow a saturated outer dressing to remain on intact skin for long.   -Call [address] with questions [phone] Fax [phone]   -Patient/Caregiver to perform wound care in the absence of nurse when competent.   -The surgeon will want to see the wound at office follow up visit, so a home care visit will not be necessary on those days. -follow up with the wound care center as well. Physician Certification: I certify the above information and transfer of Nolan Perkins  is necessary for the continuing treatment of the diagnosis listed and that she requires Home Care for less 30 days.      Update Admission H&P: No change in H&P    PHYSICIAN SIGNATURE:  Electronically signed by Beryle Pester, APRN - CNP on 11/23/22 at 2:34 PM EST

## 2022-11-25 LAB
GRAM STAIN RESULT: ABNORMAL
ORGANISM: ABNORMAL
WOUND/ABSCESS: ABNORMAL

## 2022-11-28 ENCOUNTER — HOSPITAL ENCOUNTER (OUTPATIENT)
Dept: WOUND CARE | Age: 52
Discharge: HOME | End: 2022-11-28
Payer: COMMERCIAL

## 2022-11-28 VITALS
WEIGHT: 193.12 LBS | BODY MASS INDEX: 34.22 KG/M2 | SYSTOLIC BLOOD PRESSURE: 146 MMHG | RESPIRATION RATE: 20 BRPM | HEART RATE: 89 BPM | TEMPERATURE: 98.2 F | HEIGHT: 63 IN | DIASTOLIC BLOOD PRESSURE: 84 MMHG

## 2022-11-28 DIAGNOSIS — E11.621 DIABETIC ULCER OF TOE OF LEFT FOOT ASSOCIATED WITH TYPE 2 DIABETES MELLITUS, WITH FAT LAYER EXPOSED (HCC): ICD-10-CM

## 2022-11-28 DIAGNOSIS — L97.522 DIABETIC ULCER OF TOE OF LEFT FOOT ASSOCIATED WITH TYPE 2 DIABETES MELLITUS, WITH FAT LAYER EXPOSED (HCC): ICD-10-CM

## 2022-11-28 DIAGNOSIS — L89.313 PRESSURE ULCER OF RIGHT BUTTOCK, STAGE 3 (HCC): ICD-10-CM

## 2022-11-28 DIAGNOSIS — M79.89 LEG SWELLING: ICD-10-CM

## 2022-11-28 DIAGNOSIS — L97.911 SKIN ULCER OF RIGHT LOWER LEG, LIMITED TO BREAKDOWN OF SKIN (HCC): ICD-10-CM

## 2022-11-28 DIAGNOSIS — L97.921 SKIN ULCER OF LEFT LOWER LEG, LIMITED TO BREAKDOWN OF SKIN (HCC): ICD-10-CM

## 2022-11-28 PROCEDURE — 11042 DBRDMT SUBQ TIS 1ST 20SQCM/<: CPT

## 2022-11-28 PROCEDURE — 11045 DBRDMT SUBQ TISS EACH ADDL: CPT

## 2022-11-28 PROCEDURE — 97597 DBRDMT OPN WND 1ST 20 CM/<: CPT

## 2022-11-28 RX ORDER — GINSENG 100 MG
CAPSULE ORAL ONCE
OUTPATIENT
Start: 2022-11-28 | End: 2022-11-28

## 2022-11-28 RX ORDER — LIDOCAINE 40 MG/G
CREAM TOPICAL ONCE
OUTPATIENT
Start: 2022-11-28 | End: 2022-11-28

## 2022-11-28 RX ORDER — CLOBETASOL PROPIONATE 0.5 MG/G
OINTMENT TOPICAL ONCE
OUTPATIENT
Start: 2022-11-28 | End: 2022-11-28

## 2022-11-28 RX ORDER — BACITRACIN, NEOMYCIN, POLYMYXIN B 400; 3.5; 5 [USP'U]/G; MG/G; [USP'U]/G
OINTMENT TOPICAL ONCE
OUTPATIENT
Start: 2022-11-28 | End: 2022-11-28

## 2022-11-28 RX ORDER — GENTAMICIN SULFATE 1 MG/G
OINTMENT TOPICAL ONCE
OUTPATIENT
Start: 2022-11-28 | End: 2022-11-28

## 2022-11-28 RX ORDER — BACITRACIN ZINC AND POLYMYXIN B SULFATE 500; 1000 [USP'U]/G; [USP'U]/G
OINTMENT TOPICAL ONCE
OUTPATIENT
Start: 2022-11-28 | End: 2022-11-28

## 2022-11-28 RX ORDER — BETAMETHASONE DIPROPIONATE 0.05 %
OINTMENT (GRAM) TOPICAL ONCE
OUTPATIENT
Start: 2022-11-28 | End: 2022-11-28

## 2022-11-28 RX ORDER — LIDOCAINE HYDROCHLORIDE 20 MG/ML
JELLY TOPICAL ONCE
OUTPATIENT
Start: 2022-11-28 | End: 2022-11-28

## 2022-11-28 RX ORDER — LIDOCAINE HYDROCHLORIDE 40 MG/ML
SOLUTION TOPICAL ONCE
OUTPATIENT
Start: 2022-11-28 | End: 2022-11-28

## 2022-11-28 RX ORDER — LIDOCAINE 50 MG/G
OINTMENT TOPICAL ONCE
OUTPATIENT
Start: 2022-11-28 | End: 2022-11-28

## 2022-11-28 ASSESSMENT — PAIN SCALES - GENERAL
PAINLEVEL_OUTOF10: 0
PAINLEVEL_OUTOF10: 0

## 2022-11-28 NOTE — DISCHARGE INSTRUCTIONS
[address] [place] [address] Physician Orders and Discharge Instructions  [address] [place] [address] Drive, [address], [address]  Telephone: [phone]  [address] 8:30 am - 4:30 pm and Friday 8:30 am - 1:00 pm.        NAME:  Katie Vidales  YOB: 1970  MEDICAL RECORD NUMBER:  [id number]  DATE:  11/28/2022    Important Reminders:   Please wash hands with soap and water before and after every dressing change. Do not scrub wounds. Keep wounds dry in shower unless otherwise instructed by the physician. SMOKING can slow would healing. Stop smoking as soon as possible to improve healing and prevent further complications associated with smoking. Lyssa-Wound Topical Treatments:  Do not apply lotions, creams, or ointments to wound bed unless directed. [] Apply moisturizing lotion to skin surrounding the wound prior to dressing change.  [] Apply antifungal ointment to skin surrounding the wound prior to dressing change.  [] Apply thin film of no sting moisture barrier ointment to skin immediately around wound. [] Apply thick film of zinc paste to skin immediately around wound.   _______________________________________________________  WOUND LOCATION  BUTTOCKS      Wound Cleansing: Normal Saline        PRIMARY DRESSING:              [x] ALGINATE AG                  SECONDARY DRESSING:               [x] Gauze                               [x] Cover Roll Tape                     HOW OFTEN TO CHANGE DRESSINGS:     [x] EVERY OTHER DAY OR Daily IF SOILED                   _____________________________________________________________________________________________________    WOUND LOCATION:   RIGHT AND LEFT LOWER LEG, LEFT 3RD TOE WOUND    Wound Cleansing: Normal Saline      PRIMARY DRESSING:               [x] ALGINATE AG              SECONDARY DRESSING:               [x] Gauze                              [x] Rolled Gauze                         HOW OFTEN TO CHANGE DRESSINGS:   [] Daily              [x] Three times per week:   [x] Monday- Wednesday- Friday       Compression and Edema Control:  []  [x] Ace Wrap Toes to Knee to Left Leg and Right Leg        Elevate leg(s) above the level of the heart when sitting. Avoid prolonged standing in one place.   ________________________________________________________________    Dietary:   Continue your diet as tolerated. Protein is a key nutrient in helping to repair damaged tissue and promote new tissue growth. Good sources of protein include milk, yogurt, cheese, fish, lean meat and beans. If you are DIABETIC, having diabetes can make it hard for wounds to heal. Try to keep your blood sugar within it's target range. Limit Sodium, Alcohol and Sugar.  _________________________________________________________  PRESSURE RELIEF AND OFF-LOADING:     Pressure Relief and Off Loading:  [] Off-loading when [] walking  [] in bed [] sitting   Turn every 2 hours when in bed   Avoid putting direct pressure on the site of the wound. Limit side lying to 30 degree tilt. Limit elevating the head of the bed greater than 30 degrees. Activity: Activity as Tolerated    [] Assistive Devices     please continue to use any assistive devices advised by the provider discussed during your visit      [] Surgical shoe    [] Podus Boot(s)   [] Foam Boot(s)    [] Etta Jamil   ________________________________________________________________  PAIN:    Please note, A small amount of pain, drainage and/or bleeding from this process might be expected, and is NORMAL. Elevate the affected limb. Use over-the-counter medications you would normally use for pain as permitted by your family doctor. For persistent pain not relieved by the above interventions, please call your family doctor. Call your doctor now or seek immediate medical care if:    You have symptoms of infection, such as: Increased pain, swelling, warmth, or redness.   Red streaks leading from the area. Pus draining from the area. A fever. ________________________________________________________________    Return Appointment:  DME/Wound Dressing Supplies Provided by:   (Supply companies distribute one month supply at a time. Please call them directly to reorder supplies when you run out)     ECF or Home Healthcare: [address] [place] [address] is responsible to order your supplies. Return Appointment: With Nahid Madera CNP  in  [address] [place] [address])                  [] Orders placed during your visit:           Electronically signed by : Amanda Hutchinson on 11/28/2022 at 4:06 PM     _______________________________________________________________  [facility] Information: Should you experience any significant changes in your wound(s) or have questions about your wound care, please contact the [address] [place] [address] at [address] 8:30 am - 4:30 pm and Friday 8:30 am - 1:00 pm.  If you need help with your wound outside these hours and cannot wait until we are again available, contact your PCP or go to the hospital emergency room. PLEASE NOTE: IF YOU ARE UNABLE TO OBTAIN WOUND SUPPLIES, CONTINUE TO USE THE SUPPLIES YOU HAVE AVAILABLE UNTIL YOU ARE ABLE TO REACH US. KEEP THE WOUND COVERED AT ALL TIMES.          Physician Signature:_______________________    Date: ___________ Time:  ____________       [x] Luz Ross CNP     [] Dr Phyllis Romo    [] Nahid Madera CNP

## 2022-11-28 NOTE — LETTER
[address]  [address] OFFICE [address] [facility]  [phone]  Dept: [phone]   TODAYS DATE: 11/23/2022    [facility] Wound Care   Appointment Treatment Guidelines  Welcome Ms. Alberto Dowell to the [address] [place] [address] Pkwy. We appreciate the confidence you have shown in choosing us as your wound care provider. Our goal is to heal your wound(s) as quickly as possible. Please read the items below regarding the nature of your appointments. 1. We will make every effort to schedule appointments that are convenient for you. Certain days and times may not be available, depending on your providers office hours and details of your care. 2. Patients will not necessarily be brought to an exam room in the order in which they arrive. Many providers work out of this office and patients are here for different procedures. Our goal is to serve you as quickly as possible. 3. We acknowledge that your time is valuable. Please remember that wound healing takes time and we appreciate your understanding that the length of each patients appointment will vary depending upon their need. 4. It is for your protection that we ask for insurance cards, photo ID, and new consent forms on your first visit and periodically throughout your treatment at all our facilities. 5. Wound Care treatment is known to be most effective when provided on a regular basis. Missed appointments, and not following the recommended plan of care can result in ineffective treatment and a poor outcome. If you find it difficult to keep appointments or to follow the recommended plan of care, it is your responsibility to let the staff know, so that we can work with you toward a solution. 6. If you need to miss an appointment, please call to let us know. We expect 24 hours notice for all cancellations.  We also expect missed visits to be rescheduled as soon as possible, preferably within the same week to promote the most effective healing time for your wound(s). 7. If you will be late for an appointment, please call our center to be sure that the provider can still see you when you arrive. If you are more than 15 minutes late your appointment may need to be rescheduled. 8. If two (2) appointments are missed without notifying us, your care plan may be discontinued. The same may happen if multiple visits are cancelled or rescheduled, even with notice. A missed visit is time when another patient, who also needs care, could have been seen. Thank you for your understanding and consideration.

## 2022-11-28 NOTE — PLAN OF CARE
Patient Name:  Zeenat Altamirano  YOB: 1970  Today's Date:  November 28, 2022  Medical Record Number:  [id number]  Provider:    [address] [place] [address]   Appointment Treatment Guidelines        The [address] [place] [address] Appointment Treatment Guidelines were reviewed on November 28, 2022 with the patient. Ms. Rabia Blackmon understanding of the [address] [place] [address] Appointment Treatment Guidelines.       Electronically signed by Apple Barraza RN on 11/28/22 at 3:53 PM EST

## 2022-11-29 NOTE — PROGRESS NOTES
[address]   Progress Note and Procedure Note      Sam Vick RECORD NUMBER:  [id number]  AGE: 45 y.o. GENDER: female  : 1970  EPISODE DATE:  2022    Subjective:     Chief Complaint   Patient presents with    Wound Check     Initial visit - wounds to right buttock, bilateral legs and left 4th toe. States buttock wound present since about 10/1/2022 - unknown cause. Bilateral leg wounds since about 10/1/2022 and states started after swelling. States left toe wound present since about 2022 - unknown cause. Using cream form another wound care center. Was recently in hospital for buttock wound and is on PO Zyvox. HISTORY of PRESENT ILLNESS HPI     oRsy Travis is a 45 y.o. female who presents today for wound/ulcer evaluation. History of Wound Context: Pressure ulcer to right buttock. Venous ulcers to bilateral LE's and pressure ulcer to left 4th toe, dorsal aspect. Right buttock and lower leg venous ulcers have been present since 10/1/22, while left 4th toe ulcer has been present since 22.   Wound/Ulcer Pain Timing/Severity: none  Quality of pain: N/A  Severity:  0 / 10   Modifying Factors: None  Associated Signs/Symptoms: edema and drainage    Ulcer Identification:  Ulcer Type: venous, diabetic, and pressure    Contributing Factors: edema, venous stasis, and diabetes    Acute Wound: N/A not an acute wound    PAST MEDICAL HISTORY        Diagnosis Date    Anemia     Asthma     Cerebral artery occlusion with cerebral infarction (HCC)     CHF (congestive heart failure) (HCC)     COPD (chronic obstructive pulmonary disease) ([address]     Diabetes ([address]     Edema     Foot ulcer ([address]     HTN (hypertension), benign 2013    Hyperlipidemia     PAD (peripheral artery disease) (HCC)     Pressure ulcer of right buttock, stage 3 ([address] 2022    Thyroid disease        PAST SURGICAL HISTORY    Past Surgical History:   Procedure Laterality Date    HERNIA REPAIR      TUBAL LIGATION      VASCULAR SURGERY Left     left arterial stent       FAMILY HISTORY    Family History   Problem Relation Age of Onset    COPD Mother     Early Death Brother     Cancer Maternal Aunt     Cancer Maternal Grandmother        SOCIAL HISTORY    Social History     Tobacco Use    Smoking status: Former     Packs/day: 1.00     Years: 25.00     Pack years: 25.00     Types: Cigarettes     Quit date:      Years since quittin.9    Smokeless tobacco: Never    Tobacco comments:     quit 3 weeks ago   Vaping Use    Vaping Use: Never used   Substance Use Topics    Alcohol use: No    Drug use: No       ALLERGIES    Allergies   Allergen Reactions    Codeine     Pcn [Penicillins]        MEDICATIONS    Current Outpatient Medications on File Prior to Encounter   Medication Sig Dispense Refill    linezolid (ZYVOX) 600 MG tablet Take 1 tablet by mouth 2 times daily for 10 days 20 tablet 0    saccharomyces boulardii (FLORASTOR) 250 MG capsule Take 1 capsule by mouth 2 times daily for 14 days 28 capsule 0    tiotropium-olodaterol (STIOLTO) 2.5-2.5 MCG/ACT AERS Inhale 1 puff into the lungs in the morning and at bedtime      olmesartan (BENICAR) 40 MG tablet Take 1 tablet by mouth daily      torsemide (DEMADEX) 20 MG tablet Take 1 tablet by mouth daily      levothyroxine (SYNTHROID) 25 MCG tablet Take 1 tablet by mouth every morning (before breakfast)      albuterol sulfate HFA (PROVENTIL;VENTOLIN;PROAIR) 108 (90 Base) MCG/ACT inhaler Inhale 1 puff into the lungs every 4 hours as needed      insulin glargine (LANTUS SOLOSTAR) 100 UNIT/ML injection pen Inject 20 Units into the skin every morning (before breakfast)      atorvastatin (LIPITOR) 40 MG tablet Take 1 tablet by mouth nightly      loratadine (CLARITIN) 10 MG tablet Take 1 tablet by mouth daily as needed      insulin lispro (HUMALOG) 100 UNIT/ML SOLN injection vial Inject 7 Units into the skin 3 times daily (before meals) carvedilol (COREG) 6.25 MG tablet Take 2 tablets by mouth 2 times daily (with meals)      Dulaglutide (TRULICITY) 6.42 HH/4.3PA SOPN Inject 0.75 mg into the skin once a week      methadone (DOLOPHINE) 10 MG tablet Take 40 mg by mouth daily. No current facility-administered medications on file prior to encounter. REVIEW OF SYSTEMS    Pertinent items are noted in HPI.       Objective:      BP (!) 146/84   Pulse 89   Temp 98.2 °F (36.8 °C) (Temporal)   Resp 20   Ht 5' 3\" (1.6 m)   Wt 193 lb 2 oz (87.6 kg)   BMI 34.21 kg/m²     Wt Readings from Last 3 Encounters:   11/28/22 193 lb 2 oz (87.6 kg)   11/23/22 191 lb 5.8 oz (86.8 kg)   11/22/22 198 lb (89.8 kg)       PHYSICAL EXAM    General Appearance: alert and oriented to person, place and time, well developed and well- nourished, in no acute distress  Skin: warm and dry  Head: normocephalic and atraumatic  Eyes: pupils equal, round, and reactive to light, extraocular eye movements intact, conjunctivae normal  Neck: supple   Pulmonary/Chest: clear to auscultation bilaterally- no wheezes, rales or rhonchi, normal air movement, no respiratory distress  Cardiovascular: normal rate, regular rhythm, normal S1 and S2, no murmurs, rubs, clicks, or gallops, bilateral DP's +1, no carotid bruits  Extremities: no cyanosis or clubbing; RLE +1 pitting edema, LLE +2 pitting edema  Musculoskeletal: normal range of motion, no joint swelling, deformity or tenderness  Neurologic: reflexes normal and symmetric, no cranial nerve deficit, gait, coordination and speech normal      Assessment:        Problem List Items Addressed This Visit            Skin ulcer of left lower leg, limited to breakdown of skin (HCC)    Skin ulcer of right lower leg, limited to breakdown of skin (HCC)    Diabetic ulcer of toe of left foot associated with type 2 diabetes mellitus, with fat layer exposed (HCC)    Leg swelling     Other Visit Diagnoses       Pressure ulcer of right buttock, stage 3 (HCC)                 Procedure Note  Indications:  Based on my examination of this patient's wound(s)/ulcer(s) today, debridement is required to promote healing and evaluate the wound base. Performed by: TOO Raya CNP    Consent obtained:  Yes    Time out taken:  Yes    Pain Control: Anesthetic  Anesthetic: 4% Lidocaine Liquid Topical (5ml)       Debridement: Excisional Debridement    Using curette the wound(s)/ulcer(s) was/were debrided down through and including the removal of epidermis, dermis, and subcutaneous tissue.         Devitalized Tissue Debrided:  fibrin, biofilm, slough, and necrotic/eschar    Pre Debridement Measurements:  Are located in the [facility]  Documentation Flow Sheet    Diabetic/Pressure/Non Pressure Ulcers only:  Ulcer:  Right buttock Pressure ulcer, Stage 3     Wound/Ulcer #: 5    Post Debridement Measurements:  Wound/Ulcer Descriptions are Pre Debridement except measurements:        Wound 11/28/22 Buttocks Right #5 ( snce about 10/1/2022) (Active)   Wound Image   11/28/22 1458   Wound Etiology Pressure Stage 3 11/28/22 1458   Dressing/Treatment Alginate with Ag;Gauze dressing/dressing sponge;Tape/Soft cloth adhesive tape;Barrier film 11/28/22 1645   Wound Length (cm) 6 cm 11/28/22 1458   Wound Width (cm) 4.5 cm 11/28/22 1458   Wound Depth (cm) 0.6 cm 11/28/22 1458   Wound Surface Area (cm^2) 27 cm^2 11/28/22 1458   Wound Volume (cm^3) 16.2 cm^3 11/28/22 1458   Post-Procedure Length (cm) 6.1 cm 11/28/22 1601   Post-Procedure Width (cm) 4.6 cm 11/28/22 1601   Post-Procedure Depth (cm) 0.6 cm 11/28/22 1601   Post-Procedure Surface Area (cm^2) 28.06 cm^2 11/28/22 1601   Post-Procedure Volume (cm^3) 16.836 cm^3 11/28/22 1601   Wound Assessment Granulation tissue;Slough 11/28/22 1458   Drainage Amount Large 11/28/22 1458   Drainage Description Serosanguinous 11/28/22 1458   Odor None 11/28/22 1458   Lyssa-wound Assessment Dry/flaky 11/28/22 1455   Margins Attached edges 11/28/22 1458   Wound Thickness Description not for Pressure Injury Full thickness 11/28/22 1458   Number of days: 0          Total Surface Area Debrided:  28.06 sq cm     Estimated Blood Loss:  Minimal    Hemostasis Achieved:  by pressure    Procedural Pain:  0  / 10     Post Procedural Pain:  0 / 10     Response to treatment:  Well tolerated by patient. Non-Excisional Debridement Procedure Note    Performed by: TOO Gaming CNP    Consent obtained:  Yes    Time out taken:  Yes     Pain Control: Anesthetic: 4% Lidocaine Liquid Topical (5ml)     Debridement: Non-excisional Debridement    Devitalized Tissue Debrided: slough      Instruments Used:  curette and forceps     Pre Debridement Measurements:  Are located in the Wound/Ulcer Documentation Flow Sheet  Wound/Ulcer #: 1, 2, 3, and 4     Post  Debridement Measurements:  Wound 11/28/22 Leg Right; Anterior;Proximal #1 ( since about 10/1/2022) (Active)   Wound Image   11/28/22 1458   Wound Etiology Venous 11/28/22 1458   Dressing/Treatment Alginate with Ag;Gauze dressing/dressing sponge;Roll gauze 11/28/22 1645   Wound Length (cm) 0.4 cm 11/28/22 1458   Wound Width (cm) 0.6 cm 11/28/22 1458   Wound Depth (cm) 0.1 cm 11/28/22 1458   Wound Surface Area (cm^2) 0.24 cm^2 11/28/22 1458   Wound Volume (cm^3) 0.024 cm^3 11/28/22 1458   Post-Procedure Length (cm) 0.5 cm 11/28/22 1601   Post-Procedure Width (cm) 0.7 cm 11/28/22 1601   Post-Procedure Depth (cm) 0.2 cm 11/28/22 1601   Post-Procedure Surface Area (cm^2) 0.35 cm^2 11/28/22 1601   Post-Procedure Volume (cm^3) 0.07 cm^3 11/28/22 1601   Wound Assessment Granulation tissue;Slough 11/28/22 1458   Drainage Amount Small 11/28/22 1458   Drainage Description Serosanguinous 11/28/22 1458   Odor None 11/28/22 1458   Lyssa-wound Assessment Dry/flaky 11/28/22 1458   Margins Attached edges 11/28/22 1458   Wound Thickness Description not for Pressure Injury Full thickness 11/28/22 1458   Number of days: 2 Wound 11/28/22 Leg Right; Anterior;Distal #2 ( since about 10/1/2022) (Active)   Wound Image   11/28/22 1458   Wound Etiology Venous 11/28/22 1458   Dressing/Treatment Alginate with Ag;Gauze dressing/dressing sponge;Roll gauze 11/28/22 1645   Wound Length (cm) 1.8 cm 11/28/22 1458   Wound Width (cm) 1.6 cm 11/28/22 1458   Wound Depth (cm) 0.1 cm 11/28/22 1458   Wound Surface Area (cm^2) 2.88 cm^2 11/28/22 1458   Wound Volume (cm^3) 0.288 cm^3 11/28/22 1458   Post-Procedure Length (cm) 1.9 cm 11/28/22 1601   Post-Procedure Width (cm) 1.7 cm 11/28/22 1601   Post-Procedure Depth (cm) 0.2 cm 11/28/22 1601   Post-Procedure Surface Area (cm^2) 3.23 cm^2 11/28/22 1601   Post-Procedure Volume (cm^3) 0.646 cm^3 11/28/22 1601   Wound Assessment Granulation tissue;Slough 11/28/22 1458   Drainage Amount Small 11/28/22 1458   Drainage Description Serosanguinous 11/28/22 1458   Odor None 11/28/22 1458   Lyssa-wound Assessment Dry/flaky 11/28/22 1458   Margins Undefined edges 11/28/22 1458   Wound Thickness Description not for Pressure Injury Full thickness 11/28/22 1458   Number of days: 2       Wound 11/28/22 Leg Left; Anterior #3 ( since about 10/1/2022) (Active)   Wound Image   11/28/22 1458   Wound Etiology Venous 11/28/22 1458   Dressing/Treatment Alginate with Ag;Gauze dressing/dressing sponge;Roll gauze 11/28/22 1645   Wound Length (cm) 0.9 cm 11/28/22 1458   Wound Width (cm) 1.3 cm 11/28/22 1458   Wound Depth (cm) 0.3 cm 11/28/22 1458   Wound Surface Area (cm^2) 1.17 cm^2 11/28/22 1458   Wound Volume (cm^3) 0.351 cm^3 11/28/22 1458   Post-Procedure Length (cm) 1 cm 11/28/22 1601   Post-Procedure Width (cm) 1.4 cm 11/28/22 1601   Post-Procedure Depth (cm) 0.4 cm 11/28/22 1601   Post-Procedure Surface Area (cm^2) 1.4 cm^2 11/28/22 1601   Post-Procedure Volume (cm^3) 0.56 cm^3 11/28/22 1601   Wound Assessment Granulation tissue;Slough 11/28/22 1458   Drainage Amount Large 11/28/22 1458   Drainage Description Yellow;Green 11/28/22 1458   Odor None 11/28/22 1458   Lyssa-wound Assessment Dry/flaky 11/28/22 1458   Margins Attached edges 11/28/22 1458   Wound Thickness Description not for Pressure Injury Full thickness 11/28/22 1458   Number of days: 2       Wound 11/28/22 Toe (Comment  which one) Left;Dorsal #4 left 4th toe ( since about 11/1/2022) (Active)   Wound Image   11/28/22 1458   Wound Etiology Other 11/28/22 1458   Dressing/Treatment Alginate with Ag;Gauze dressing/dressing sponge;Roll gauze 11/28/22 1645   Wound Length (cm) 0.3 cm 11/28/22 1458   Wound Width (cm) 0.4 cm 11/28/22 1458   Wound Depth (cm) 0.1 cm 11/28/22 1458   Wound Surface Area (cm^2) 0.12 cm^2 11/28/22 1458   Wound Volume (cm^3) 0.012 cm^3 11/28/22 1458   Post-Procedure Length (cm) 0.4 cm 11/28/22 1601   Post-Procedure Width (cm) 0.5 cm 11/28/22 1601   Post-Procedure Depth (cm) 0.2 cm 11/28/22 1601   Post-Procedure Surface Area (cm^2) 0.2 cm^2 11/28/22 1601   Post-Procedure Volume (cm^3) 0.04 cm^3 11/28/22 1601   Wound Assessment Granulation tissue;Slough 11/28/22 1458   Drainage Amount Small 11/28/22 1458   Drainage Description Serosanguinous 11/28/22 1458   Odor None 11/28/22 1458   Lyssa-wound Assessment Dry/flaky 11/28/22 1458   Margins Attached edges 11/28/22 1458   Wound Thickness Description not for Pressure Injury Full thickness 11/28/22 1458   Number of days: 2           Percent of Wound/Ulcer Debrided:  100%    Total Surface Area Debrided:  5.18 sq cm    Diabetic/Pressure/Non Pressure Ulcers only:  Ulcer: Left 4th toe Pressure ulcer, Stage 2, wound #4, from hammer toe joint hitting top of shoe    Bleeding:  Minimal    Hemostasis Achieved:  by pressure    Procedural Pain: 0  / 10     Post Procedural Pain:  0 / 10     Response to treatment:  Well tolerated by patient. Plan:     Treatment Note please see attached Discharge Instructions    Written patient dismissal instructions given to patient and signed by patient or POA.          Discharge [address] [place] [address] Wound Care Physician Orders and Discharge Instructions  [address] [place] [address], [address] [place] [address]  Telephone: [phone]  [address] 8:30 am - 4:30 pm and Friday 8:30 am - 1:00 pm.        NAME:  Flash Gresham  YOB: 1970  MEDICAL RECORD NUMBER:  [id number]  DATE:  11/28/2022    Important Reminders:   Please wash hands with soap and water before and after every dressing change. Do not scrub wounds. Keep wounds dry in shower unless otherwise instructed by the physician. SMOKING can slow would healing. Stop smoking as soon as possible to improve healing and prevent further complications associated with smoking. Lyssa-Wound Topical Treatments:  Do not apply lotions, creams, or ointments to wound bed unless directed. [] Apply moisturizing lotion to skin surrounding the wound prior to dressing change.  [] Apply antifungal ointment to skin surrounding the wound prior to dressing change.  [] Apply thin film of no sting moisture barrier ointment to skin immediately around wound. [] Apply thick film of zinc paste to skin immediately around wound.   _______________________________________________________  WOUND LOCATION  BUTTOCKS      Wound Cleansing: Normal Saline        PRIMARY DRESSING:              [x] ALGINATE AG                  SECONDARY DRESSING:               [x] Gauze                               [x] Cover Roll Tape                     HOW OFTEN TO CHANGE DRESSINGS:     [x] EVERY OTHER DAY OR Daily IF SOILED                   _____________________________________________________________________________________________________    WOUND LOCATION:   RIGHT AND LEFT LOWER LEG, LEFT 3RD TOE WOUND    Wound Cleansing: Normal Saline      PRIMARY DRESSING:               [x] ALGINATE AG              SECONDARY DRESSING:               [x] Gauze                              [x] Rolled Gauze HOW OFTEN TO CHANGE DRESSINGS:   [] Daily              [x] Three times per week:   [x] Monday- Wednesday- Friday       Compression and Edema Control:  []  [x] Ace Wrap Toes to Knee to Left Leg and Right Leg        Elevate leg(s) above the level of the heart when sitting. Avoid prolonged standing in one place.   ________________________________________________________________    Dietary:   Continue your diet as tolerated. Protein is a key nutrient in helping to repair damaged tissue and promote new tissue growth. Good sources of protein include milk, yogurt, cheese, fish, lean meat and beans. If you are DIABETIC, having diabetes can make it hard for wounds to heal. Try to keep your blood sugar within it's target range. Limit Sodium, Alcohol and Sugar.  _________________________________________________________  PRESSURE RELIEF AND OFF-LOADING:     Pressure Relief and Off Loading:  [] Off-loading when [] walking  [] in bed [] sitting   Turn every 2 hours when in bed   Avoid putting direct pressure on the site of the wound. Limit side lying to 30 degree tilt. Limit elevating the head of the bed greater than 30 degrees. Activity: Activity as Tolerated    [] Assistive Devices     please continue to use any assistive devices advised by the provider discussed during your visit      [] Surgical shoe    [] Podus Boot(s)   [] Foam Boot(s)    [] Raisa Martinez   ________________________________________________________________  PAIN:    Please note, A small amount of pain, drainage and/or bleeding from this process might be expected, and is NORMAL. Elevate the affected limb. Use over-the-counter medications you would normally use for pain as permitted by your family doctor. For persistent pain not relieved by the above interventions, please call your family doctor. Call your doctor now or seek immediate medical care if:    You have symptoms of infection, such as:   Increased pain, swelling, warmth, or redness. Red streaks leading from the area. Pus draining from the area. A fever. ________________________________________________________________    Return Appointment:  DME/Wound Dressing Supplies Provided by:   (Supply companies distribute one month supply at a time. Please call them directly to reorder supplies when you run out)     ECF or Home Healthcare: [address] [place] [address] is responsible to order your supplies. Return Appointment: With Malcolm Hurd CNP  in  [address] [place] [address])                  [] Orders placed during your visit:           Electronically signed by : Lorenzo Keith on 11/28/2022 at 4:06 PM     _______________________________________________________________  [facility] Information: Should you experience any significant changes in your wound(s) or have questions about your wound care, please contact the [address] [place] [address] at [address] 8:30 am - 4:30 pm and Friday 8:30 am - 1:00 pm.  If you need help with your wound outside these hours and cannot wait until we are again available, contact your PCP or go to the hospital emergency room. PLEASE NOTE: IF YOU ARE UNABLE TO OBTAIN WOUND SUPPLIES, CONTINUE TO USE THE SUPPLIES YOU HAVE AVAILABLE UNTIL YOU ARE ABLE TO REACH US. KEEP THE WOUND COVERED AT ALL TIMES.          Physician Signature:_______________________    Date: ___________ Time:  ____________       [x] Yvonne Daniels CNP     [] Dr Wilber Montoya    [] Malcolm Hurd CNP                             Electronically signed by TOO Iglesias CNP on 11/28/2022 at 11:45 PM

## 2022-12-05 ENCOUNTER — HOSPITAL ENCOUNTER (OUTPATIENT)
Dept: WOUND CARE | Age: 52
Discharge: HOME | End: 2022-12-05
Payer: COMMERCIAL

## 2022-12-05 VITALS
HEART RATE: 94 BPM | TEMPERATURE: 97.2 F | SYSTOLIC BLOOD PRESSURE: 153 MMHG | RESPIRATION RATE: 20 BRPM | DIASTOLIC BLOOD PRESSURE: 88 MMHG

## 2022-12-05 DIAGNOSIS — L97.911 SKIN ULCER OF RIGHT LOWER LEG, LIMITED TO BREAKDOWN OF SKIN (HCC): ICD-10-CM

## 2022-12-05 DIAGNOSIS — L97.921 SKIN ULCER OF LEFT LOWER LEG, LIMITED TO BREAKDOWN OF SKIN (HCC): Primary | ICD-10-CM

## 2022-12-05 DIAGNOSIS — L97.522 DIABETIC ULCER OF TOE OF LEFT FOOT ASSOCIATED WITH TYPE 2 DIABETES MELLITUS, WITH FAT LAYER EXPOSED (HCC): ICD-10-CM

## 2022-12-05 DIAGNOSIS — E11.621 DIABETIC ULCER OF TOE OF LEFT FOOT ASSOCIATED WITH TYPE 2 DIABETES MELLITUS, WITH FAT LAYER EXPOSED (HCC): ICD-10-CM

## 2022-12-05 DIAGNOSIS — M79.89 LEG SWELLING: ICD-10-CM

## 2022-12-05 DIAGNOSIS — L89.313 PRESSURE ULCER OF RIGHT BUTTOCK, STAGE 3 (HCC): ICD-10-CM

## 2022-12-05 PROCEDURE — 11042 DBRDMT SUBQ TIS 1ST 20SQCM/<: CPT

## 2022-12-05 PROCEDURE — 11045 DBRDMT SUBQ TISS EACH ADDL: CPT

## 2022-12-05 RX ORDER — LIDOCAINE HYDROCHLORIDE 40 MG/ML
SOLUTION TOPICAL ONCE
Status: COMPLETED | OUTPATIENT
Start: 2022-12-05 | End: 2022-12-05

## 2022-12-05 RX ORDER — GENTAMICIN SULFATE 1 MG/G
OINTMENT TOPICAL ONCE
OUTPATIENT
Start: 2022-12-05 | End: 2022-12-05

## 2022-12-05 RX ORDER — CLOBETASOL PROPIONATE 0.5 MG/G
OINTMENT TOPICAL ONCE
OUTPATIENT
Start: 2022-12-05 | End: 2022-12-05

## 2022-12-05 RX ORDER — BACITRACIN, NEOMYCIN, POLYMYXIN B 400; 3.5; 5 [USP'U]/G; MG/G; [USP'U]/G
OINTMENT TOPICAL ONCE
OUTPATIENT
Start: 2022-12-05 | End: 2022-12-05

## 2022-12-05 RX ORDER — LIDOCAINE HYDROCHLORIDE 40 MG/ML
SOLUTION TOPICAL ONCE
OUTPATIENT
Start: 2022-12-05 | End: 2022-12-05

## 2022-12-05 RX ORDER — GINSENG 100 MG
CAPSULE ORAL ONCE
OUTPATIENT
Start: 2022-12-05 | End: 2022-12-05

## 2022-12-05 RX ORDER — LIDOCAINE HYDROCHLORIDE 20 MG/ML
JELLY TOPICAL ONCE
OUTPATIENT
Start: 2022-12-05 | End: 2022-12-05

## 2022-12-05 RX ORDER — BETAMETHASONE DIPROPIONATE 0.05 %
OINTMENT (GRAM) TOPICAL ONCE
OUTPATIENT
Start: 2022-12-05 | End: 2022-12-05

## 2022-12-05 RX ORDER — LIDOCAINE 40 MG/G
CREAM TOPICAL ONCE
OUTPATIENT
Start: 2022-12-05 | End: 2022-12-05

## 2022-12-05 RX ORDER — BACITRACIN ZINC AND POLYMYXIN B SULFATE 500; 1000 [USP'U]/G; [USP'U]/G
OINTMENT TOPICAL ONCE
OUTPATIENT
Start: 2022-12-05 | End: 2022-12-05

## 2022-12-05 RX ORDER — LIDOCAINE 50 MG/G
OINTMENT TOPICAL ONCE
OUTPATIENT
Start: 2022-12-05 | End: 2022-12-05

## 2022-12-05 RX ADMIN — LIDOCAINE HYDROCHLORIDE 15 ML: 40 SOLUTION TOPICAL at 14:29

## 2022-12-05 ASSESSMENT — PAIN SCALES - GENERAL
PAINLEVEL_OUTOF10: 0
PAINLEVEL_OUTOF10: 0

## 2022-12-05 NOTE — DISCHARGE INSTRUCTIONS
Discharge [address] [place] [address] Physician Orders and Discharge Instructions  [address] [facility], [address] [place] [address]  Telephone: [phone]  Bridgett Rios 8:30 am - 4:30 pm and Friday 8:30 am - 1:00 pm.          NAME:  Effie Wolf  YOB: 1970  MEDICAL RECORD NUMBER:  [id number]  DATE:  12/5/2022     Important Reminders:   Please wash hands with soap and water before and after every dressing change. Do not scrub wounds. Keep wounds dry in shower unless otherwise instructed by the physician. SMOKING can slow would healing. Stop smoking as soon as possible to improve healing and prevent further complications associated with smoking. DENSE FOAM - 3-4 IN      Lyssa-Wound Topical Treatments:  Do not apply lotions, creams, or ointments to wound bed unless directed. [] Apply moisturizing lotion to skin surrounding the wound prior to dressing change.  [] Apply antifungal ointment to skin surrounding the wound prior to dressing change.  [] Apply thin film of no sting moisture barrier ointment to skin immediately around wound. [] Apply thick film of zinc paste to skin immediately around wound.   _______________________________________________________  WOUND LOCATION  BUTTOCKS        Wound Cleansing: Normal Saline        PRIMARY DRESSING:              [x] ALGINATE AG                    SECONDARY DRESSING:                [x] Gauze                                                [x] Cover Roll Tape                       HOW OFTEN TO CHANGE DRESSINGS:                [x] EVERY OTHER DAY OR Daily IF SOILED                                  _____________________________________________________________  WOUND LOCATION:   LEFT 3RD TOE WOUND    Wound Cleansing: Normal Saline      PRIMARY DRESSING:               [x] BETADINE               SECONDARY DRESSING:                [x] Gauze []                           HOW OFTEN TO CHANGE DRESSINGS:             [] Daily                            [x] Three times per week:   [x] Monday- Wednesday- Friday    _____________________________________________________________________________________________________     WOUND LOCATION:   RIGHT AND LEFT LOWER LEG  Wound Cleansing: Normal Saline        PRIMARY DRESSING:               [x] ALGINATE AG                SECONDARY DRESSING:                [x] Gauze                                               [x] Rolled Gauze                           HOW OFTEN TO CHANGE DRESSINGS:             [] Daily                            [x] Three times per week:   [x] Monday- Wednesday- Friday         Compression and Edema Control:    [x] Ace Wrap STARTING FROM  Toes ALL THE WAY to THE Knee to Left Leg and Right Leg          Elevate leg(s) above the level of the heart when sitting. Avoid prolonged standing in one place.   ________________________________________________________________     Dietary:   Continue your diet as tolerated. Protein is a key nutrient in helping to repair damaged tissue and promote new tissue growth. Good sources of protein include milk, yogurt, cheese, fish, lean meat and beans. If you are DIABETIC, having diabetes can make it hard for wounds to heal. Try to keep your blood sugar within it's target range. Limit Sodium, Alcohol and Sugar.  _________________________________________________________  PRESSURE RELIEF AND OFF-LOADING:      Pressure Relief and Off Loading:  [] Off-loading when       [] walking     [] in bed       [] sitting   Turn every 2 hours when in bed     Avoid putting direct pressure on the site of the wound. Limit side lying to 30 degree tilt. Limit elevating the head of the bed greater than 30 degrees.         Activity: Activity as Tolerated     [] Assistive Devices     please continue to use any assistive devices advised by the provider discussed during your visit       [] Surgical shoe    [] Podus Boot(s)   [] Foam Boot(s)    [] CROW Boot   ________________________________________________________________  PAIN:     Please note, A small amount of pain, drainage and/or bleeding from this process might be expected, and is NORMAL. Elevate the affected limb. Use over-the-counter medications you would normally use for pain as permitted by your family doctor. For persistent pain not relieved by the above interventions, please call your family doctor. Call your doctor now or seek immediate medical care if:    You have symptoms of infection, such as: Increased pain, swelling, warmth, or redness. Red streaks leading from the area. Pus draining from the area. A fever. ________________________________________________________________     Return Appointment:  DME/Wound Dressing Supplies Provided by:   (Supply companies distribute one month supply at a time. Please call them directly to reorder supplies when you run out)     Counts include 234 beds at the [facility] or Home Healthcare: [address] [place] [address] is responsible to order your supplies. Return Appointment: With Kate Vega CNP  in  [address] [place] [address])                   [] Orders placed during your visit:             Electronically signed by : Fredrick Neil on 12/5/2022 at 2:50 PM     _______________________________________________________________  [facility] Information: Should you experience any significant changes in your wound(s) or have questions about your wound care, please contact the [address] [place] [address] at [address] 8:30 am - 4:30 pm and Friday 8:30 am - 1:00 pm.  If you need help with your wound outside these hours and cannot wait until we are again available, contact your PCP or go to the hospital emergency room. PLEASE NOTE: IF YOU ARE UNABLE TO OBTAIN WOUND SUPPLIES, CONTINUE TO USE THE SUPPLIES YOU HAVE AVAILABLE UNTIL YOU ARE ABLE TO REACH US.  KEEP THE WOUND COVERED AT ALL TIMES.            Physician Signature:_______________________     Date: ___________ Time:  ____________                    [] Emory Schirmer CNP     [] Dr Kathaleen Schaumann    [x] Everett Rodriguez CNP

## 2022-12-07 NOTE — PROGRESS NOTES
[address]   Progress Note and Procedure Note      Sam Vick RECORD NUMBER:  [id number]  AGE: 45 y.o. GENDER: female  : 1970  EPISODE DATE:  2022    Subjective:     Chief Complaint   Patient presents with    Wound Check     Follow up right and left leg and buttock         HISTORY of PRESENT ILLNESS HPI   Nina Monroe is a 45 y.o. female who presents today for wound/ulcer evaluation. History of Wound Context: Pressure ulcer to right buttock. Venous ulcers to bilateral LE's and pressure ulcer to left 4th toe, dorsal aspect. Right buttock and lower leg venous ulcers have been present since 10/1/22, while left 4th toe ulcer has been present since 22.  Wearing nasal oxygen on visit  Wound/Ulcer Pain Timing/Severity: none  Quality of pain: N/A  Severity:  0 / 10   Modifying Factors: None  Associated Signs/Symptoms: edema and drainage     Ulcer Identification:  Ulcer Type: venous, diabetic, and pressure     Contributing Factors: edema, venous stasis, and diabetes     Acute Wound: N/A not an acute wound  PAST MEDICAL HISTORY        Diagnosis Date    Anemia     Asthma     Cerebral artery occlusion with cerebral infarction (HCC)     CHF (congestive heart failure) (HCC)     COPD (chronic obstructive pulmonary disease) ([address]     Diabetes ([address]     Edema     Foot ulcer ([address]     HTN (hypertension), benign 2013    Hyperlipidemia     PAD (peripheral artery disease) (HCC)     Pressure ulcer of right buttock, stage 3 ([address] 2022    Thyroid disease        PAST SURGICAL HISTORY    Past Surgical History:   Procedure Laterality Date    HERNIA REPAIR      TUBAL LIGATION      VASCULAR SURGERY Left     left arterial stent       FAMILY HISTORY    Family History   Problem Relation Age of Onset    COPD Mother     Early Death Brother     Cancer Maternal Aunt     Cancer Maternal Grandmother        SOCIAL HISTORY    Social History     Tobacco Use Smoking status: Former     Packs/day: 1.00     Years: 25.00     Pack years: 25.00     Types: Cigarettes     Quit date: 2017     Years since quittin.9    Smokeless tobacco: Never    Tobacco comments:     quit 3 weeks ago   Vaping Use    Vaping Use: Never used   Substance Use Topics    Alcohol use: No    Drug use: No       ALLERGIES    Allergies   Allergen Reactions    Codeine     Pcn [Penicillins]        MEDICATIONS    Current Outpatient Medications on File Prior to Encounter   Medication Sig Dispense Refill    saccharomyces boulardii (FLORASTOR) 250 MG capsule Take 1 capsule by mouth 2 times daily for 14 days 28 capsule 0    tiotropium-olodaterol (STIOLTO) 2.5-2.5 MCG/ACT AERS Inhale 1 puff into the lungs in the morning and at bedtime      olmesartan (BENICAR) 40 MG tablet Take 1 tablet by mouth daily      torsemide (DEMADEX) 20 MG tablet Take 1 tablet by mouth daily      levothyroxine (SYNTHROID) 25 MCG tablet Take 1 tablet by mouth every morning (before breakfast)      albuterol sulfate HFA (PROVENTIL;VENTOLIN;PROAIR) 108 (90 Base) MCG/ACT inhaler Inhale 1 puff into the lungs every 4 hours as needed      insulin glargine (LANTUS SOLOSTAR) 100 UNIT/ML injection pen Inject 20 Units into the skin every morning (before breakfast)      atorvastatin (LIPITOR) 40 MG tablet Take 1 tablet by mouth nightly      loratadine (CLARITIN) 10 MG tablet Take 1 tablet by mouth daily as needed      insulin lispro (HUMALOG) 100 UNIT/ML SOLN injection vial Inject 7 Units into the skin 3 times daily (before meals)      carvedilol (COREG) 6.25 MG tablet Take 2 tablets by mouth 2 times daily (with meals)      Dulaglutide (TRULICITY) 2.42 LX/1.9MK SOPN Inject 0.75 mg into the skin once a week      methadone (DOLOPHINE) 10 MG tablet Take 40 mg by mouth daily. No current facility-administered medications on file prior to encounter. REVIEW OF SYSTEMS  Review of Systems    Pertinent items are noted in HPI.     Objective:      BP (!) 153/88   Pulse 94   Temp 97.2 °F (36.2 °C) (Temporal)   Resp 20     Wt Readings from Last 3 Encounters:   11/28/22 193 lb 2 oz (87.6 kg)   11/23/22 191 lb 5.8 oz (86.8 kg)   11/22/22 198 lb (89.8 kg)       PHYSICAL EXAM  Physical Exam    General Appearance: alert and oriented to person, place and time  Skin: warm and dry  Head: normocephalic and atraumatic  Eyes: pupils equal, round, and reactive to light  Pulmonary/Chest:  normal air movement, no respiratory distress  Cardiovascular: normal rate, regular rhythm, normal S1 and S2, no murmurs, rubs, clicks or gallops, distal pulses intact, no carotid bruits and normal rate  Extremities: no cyanosis and no clubbing      Assessment:        Problem List Items Addressed This Visit       Pressure ulcer of right buttock, stage 3 (HCC)    Skin ulcer of left lower leg, limited to breakdown of skin (HCC) - Primary    Skin ulcer of right lower leg, limited to breakdown of skin (HCC)    Diabetic ulcer of toe of left foot associated with type 2 diabetes mellitus, with fat layer exposed ([facility]    Leg swelling        Procedure Note  Indications:  Based on my examination of this patient's wound(s)/ulcer(s) today, debridement is required to promote healing and evaluate the wound base. Performed by: TOO Miranda - CNP    Consent obtained:  Yes    Time out taken:  Yes    Pain Control: Anesthetic  Anesthetic: 4% Lidocaine Liquid Topical (15 ml)       Debridement: Excisional Debridement    Using curette and forceps the wound(s)/ulcer(s) was/were debrided down through and including the removal of epidermis, dermis, and subcutaneous tissue.         Devitalized Tissue Debrided:  fibrin, biofilm, and slough    Pre Debridement Measurements:  Are located in the [facility]  Documentation Flow Sheet    Diabetic/Pressure/Non Pressure Ulcers only:  Ulcer: Diabetic ulcer, fat layer exposed and Non-Pressure ulcer, fat layer exposed     Wound/Ulcer #: 1, 2, 3, and 5    Post Debridement Measurements:  Wound/Ulcer Descriptions are Pre Debridement except measurements:    Wound 11/28/22 Leg Right; Anterior;Proximal #1 ( since about 10/1/2022) (Active)   Wound Image   12/05/22 1430   Wound Etiology Venous 11/28/22 1458   Dressing/Treatment Alginate with Ag;Gauze dressing/dressing sponge;Roll gauze; Ace wrap 12/05/22 1528   Wound Length (cm) 2 cm 12/05/22 1430   Wound Width (cm) 1.3 cm 12/05/22 1430   Wound Depth (cm) 0.1 cm 12/05/22 1430   Wound Surface Area (cm^2) 2.6 cm^2 12/05/22 1430   Change in Wound Size % (l*w) -983.33 12/05/22 1430   Wound Volume (cm^3) 0.26 cm^3 12/05/22 1430   Wound Healing % -983 12/05/22 1430   Post-Procedure Length (cm) 2.1 cm 12/05/22 1448   Post-Procedure Width (cm) 1.4 cm 12/05/22 1448   Post-Procedure Depth (cm) 0.2 cm 12/05/22 1448   Post-Procedure Surface Area (cm^2) 2.94 cm^2 12/05/22 1448   Post-Procedure Volume (cm^3) 0.588 cm^3 12/05/22 1448   Wound Assessment Granulation tissue;Slough 12/05/22 1430   Drainage Amount Small 12/05/22 1430   Drainage Description Serosanguinous 12/05/22 1430   Odor None 12/05/22 1430   Lyssa-wound Assessment Blanchable erythema;Dry/flaky 12/05/22 1430   Margins Attached edges 12/05/22 1430   Wound Thickness Description not for Pressure Injury Full thickness 12/05/22 1430   Number of days: 8       Wound 11/28/22 Leg Right; Anterior;Distal #2 ( since about 10/1/2022) (Active)   Wound Image   12/05/22 1430   Wound Etiology Venous 11/28/22 1458   Dressing/Treatment Alginate with Ag;Gauze dressing/dressing sponge;Roll gauze; Ace wrap 12/05/22 1528   Wound Length (cm) 0.7 cm 12/05/22 1430   Wound Width (cm) 0.5 cm 12/05/22 1430   Wound Depth (cm) 0.1 cm 12/05/22 1430   Wound Surface Area (cm^2) 0.35 cm^2 12/05/22 1430   Change in Wound Size % (l*w) 87.85 12/05/22 1430   Wound Volume (cm^3) 0.035 cm^3 12/05/22 1430   Wound Healing % 88 12/05/22 1430   Post-Procedure Length (cm) 0.8 cm 12/05/22 1448   Post-Procedure Width (cm) 0.6 cm 12/05/22 1448   Post-Procedure Depth (cm) 0.2 cm 12/05/22 1448   Post-Procedure Surface Area (cm^2) 0.48 cm^2 12/05/22 1448   Post-Procedure Volume (cm^3) 0.096 cm^3 12/05/22 1448   Wound Assessment Eschar dry 12/05/22 1430   Drainage Amount Small 12/05/22 1430   Drainage Description Serosanguinous 12/05/22 1430   Odor None 12/05/22 1430   Lyssa-wound Assessment Dry/flaky; Blanchable erythema 12/05/22 1430   Margins Undefined edges 12/05/22 1430   Wound Thickness Description not for Pressure Injury Full thickness 12/05/22 1430   Number of days: 8       Wound 11/28/22 Leg Left; Anterior #3 ( since about 10/1/2022) (Active)   Wound Image   12/05/22 1430   Wound Etiology Venous 11/28/22 1458   Dressing/Treatment Alginate with Ag;Gauze dressing/dressing sponge;Roll gauze; Ace wrap 12/05/22 1528   Wound Length (cm) 1.1 cm 12/05/22 1430   Wound Width (cm) 1.1 cm 12/05/22 1430   Wound Depth (cm) 0.1 cm 12/05/22 1430   Wound Surface Area (cm^2) 1.21 cm^2 12/05/22 1430   Change in Wound Size % (l*w) -3.42 12/05/22 1430   Wound Volume (cm^3) 0.121 cm^3 12/05/22 1430   Wound Healing % 66 12/05/22 1430   Post-Procedure Length (cm) 1.2 cm 12/05/22 1448   Post-Procedure Width (cm) 1.2 cm 12/05/22 1448   Post-Procedure Depth (cm) 0.2 cm 12/05/22 1448   Post-Procedure Surface Area (cm^2) 1.44 cm^2 12/05/22 1448   Post-Procedure Volume (cm^3) 0.288 cm^3 12/05/22 1448   Wound Assessment Granulation tissue;Slough 12/05/22 1430   Drainage Amount Small 12/05/22 1430   Drainage Description Serosanguinous 12/05/22 1430   Odor None 12/05/22 1430   Lyssa-wound Assessment Dry/flaky; Blanchable erythema 12/05/22 1430   Margins Attached edges 12/05/22 1430   Wound Thickness Description not for Pressure Injury Full thickness 12/05/22 1430   Number of days: 8       Wound 11/28/22 Toe (Comment  which one) Left;Dorsal #4 left 4th toe ( since about 11/1/2022) (Active)   Wound Image   12/05/22 1430   Wound Etiology Other 11/28/22 1458   Dressing/Treatment Betadine swabs/povidone iodine 12/05/22 1528   Wound Length (cm) 0.5 cm 12/05/22 1430   Wound Width (cm) 0.8 cm 12/05/22 1430   Wound Depth (cm) 0.1 cm 12/05/22 1430   Wound Surface Area (cm^2) 0.4 cm^2 12/05/22 1430   Change in Wound Size % (l*w) -233.33 12/05/22 1430   Wound Volume (cm^3) 0.04 cm^3 12/05/22 1430   Wound Healing % -233 12/05/22 1430   Post-Procedure Length (cm) 0.5 cm 12/05/22 1448   Post-Procedure Width (cm) 0.8 cm 12/05/22 1448   Post-Procedure Depth (cm) 0.1 cm 12/05/22 1448   Post-Procedure Surface Area (cm^2) 0.4 cm^2 12/05/22 1448   Post-Procedure Volume (cm^3) 0.04 cm^3 12/05/22 1448   Wound Assessment Eschar dry 12/05/22 1430   Drainage Amount None 12/05/22 1430   Drainage Description Serosanguinous 11/28/22 1458   Odor None 12/05/22 1430   Lyssa-wound Assessment Dry/flaky 12/05/22 1430   Margins Undefined edges 12/05/22 1430   Wound Thickness Description not for Pressure Injury Full thickness 12/05/22 1430   Number of days: 8       Wound 11/28/22 Buttocks Right #5 ( snce about 10/1/2022) (Active)   Wound Image   12/05/22 1426   Wound Etiology Pressure Stage 3 11/28/22 1458   Dressing/Treatment Alginate with Ag;Gauze dressing/dressing sponge;Tape/Soft cloth adhesive tape;Barrier film 12/05/22 1528   Wound Length (cm) 6 cm 12/05/22 1426   Wound Width (cm) 3.2 cm 12/05/22 1426   Wound Depth (cm) 0.4 cm 12/05/22 1426   Wound Surface Area (cm^2) 19.2 cm^2 12/05/22 1426   Change in Wound Size % (l*w) 28.89 12/05/22 1426   Wound Volume (cm^3) 7.68 cm^3 12/05/22 1426   Wound Healing % 53 12/05/22 1426   Post-Procedure Length (cm) 6.1 cm 12/05/22 1448   Post-Procedure Width (cm) 3.3 cm 12/05/22 1448   Post-Procedure Depth (cm) 0.4 cm 12/05/22 1448   Post-Procedure Surface Area (cm^2) 20.13 cm^2 12/05/22 1448   Post-Procedure Volume (cm^3) 8.052 cm^3 12/05/22 1448   Wound Assessment Granulation tissue;Slough; Hyper granulation tissue 12/05/22 1426   Drainage Amount Large 12/05/22 1426   Drainage Description Serosanguinous; Other (Comment) 12/05/22 1426   Odor None 12/05/22 1426   Lyssa-wound Assessment Dry/flaky 12/05/22 1426   Margins Attached edges 12/05/22 1426   Wound Thickness Description not for Pressure Injury Full thickness 12/05/22 1426   Number of days: 8          Total Surface Area Debrided:  24.99 sq cm     Estimated Blood Loss:  Minimal    Hemostasis Achieved:  by pressure    Procedural Pain:  2  / 10     Post Procedural Pain:  0 / 10     Response to treatment:  Well tolerated by patient. Plan:     Treatment Note please see attached Discharge Instructions    Written patient dismissal instructions given to patient and signed by patient or POA. Discharge Instructions           Discharge [facility] Physician Orders and Discharge Instructions  [facility] [facility], [facility]. , [place] [address]  Telephone: [phone] [address] 8:30 am - 4:30 pm and Friday 8:30 am - 1:00 pm.          NAME:  Sarah Browning  YOB: 1970  MEDICAL RECORD NUMBER:  [id number]  DATE:  12/5/2022     Important Reminders:   Please wash hands with soap and water before and after every dressing change. Do not scrub wounds. Keep wounds dry in shower unless otherwise instructed by the physician. SMOKING can slow would healing. Stop smoking as soon as possible to improve healing and prevent further complications associated with smoking. DENSE FOAM - 3-4 IN      Lyssa-Wound Topical Treatments:  Do not apply lotions, creams, or ointments to wound bed unless directed. [] Apply moisturizing lotion to skin surrounding the wound prior to dressing change.  [] Apply antifungal ointment to skin surrounding the wound prior to dressing change.  [] Apply thin film of no sting moisture barrier ointment to skin immediately around wound.   [] Apply thick film of zinc paste to skin immediately around and beans. If you are DIABETIC, having diabetes can make it hard for wounds to heal. Try to keep your blood sugar within it's target range. Limit Sodium, Alcohol and Sugar.  _________________________________________________________  PRESSURE RELIEF AND OFF-LOADING:      Pressure Relief and Off Loading:  [] Off-loading when       [] walking     [] in bed       [] sitting   Turn every 2 hours when in bed     Avoid putting direct pressure on the site of the wound. Limit side lying to 30 degree tilt. Limit elevating the head of the bed greater than 30 degrees. Activity: Activity as Tolerated     [] Assistive Devices     please continue to use any assistive devices advised by the provider discussed during your visit       [] Surgical shoe    [] Podus Boot(s)   [] Foam Boot(s)    [] Vincenza Holter   ________________________________________________________________  PAIN:     Please note, A small amount of pain, drainage and/or bleeding from this process might be expected, and is NORMAL. Elevate the affected limb. Use over-the-counter medications you would normally use for pain as permitted by your family doctor. For persistent pain not relieved by the above interventions, please call your family doctor. Call your doctor now or seek immediate medical care if:    You have symptoms of infection, such as: Increased pain, swelling, warmth, or redness. Red streaks leading from the area. Pus draining from the area. A fever. ________________________________________________________________     Return Appointment:  DME/Wound Dressing Supplies Provided by:   (Supply companies distribute one month supply at a time. Please call them directly to reorder supplies when you run out)     ECF or Home Healthcare: [address] [place] [address] is responsible to order your supplies.       Return Appointment: With Tam Dear MARLENA  in  1  [facility])                   [] Orders placed during your visit: Electronically signed by : Tyrel Pimentel on 12/5/2022 at 2:50 PM     _______________________________________________________________  [facility] Information: Should you experience any significant changes in your wound(s) or have questions about your wound care, please contact the [address] [place] [address] at [address] 8:30 am - 4:30 pm and Friday 8:30 am - 1:00 pm.  If you need help with your wound outside these hours and cannot wait until we are again available, contact your PCP or go to the hospital emergency room. PLEASE NOTE: IF YOU ARE UNABLE TO OBTAIN WOUND SUPPLIES, CONTINUE TO USE THE SUPPLIES YOU HAVE AVAILABLE UNTIL YOU ARE ABLE TO REACH US. KEEP THE WOUND COVERED AT ALL TIMES.            Physician Signature:_______________________     Date: ___________ Time:  ____________                    [] Irais Moreno CNP     [] Dr Ankit Red    [x] Rodolfo Cavanaugh CNP             Electronically signed by TOO Bermudez CNP on 12/7/2022 at 9:59 AM

## 2022-12-12 ENCOUNTER — HOSPITAL ENCOUNTER (OUTPATIENT)
Dept: WOUND CARE | Age: 52
Discharge: HOME | End: 2022-12-12
Payer: COMMERCIAL

## 2022-12-12 VITALS
TEMPERATURE: 97.2 F | DIASTOLIC BLOOD PRESSURE: 77 MMHG | RESPIRATION RATE: 18 BRPM | HEART RATE: 94 BPM | SYSTOLIC BLOOD PRESSURE: 162 MMHG

## 2022-12-12 DIAGNOSIS — L97.911 SKIN ULCER OF RIGHT LOWER LEG, LIMITED TO BREAKDOWN OF SKIN (HCC): ICD-10-CM

## 2022-12-12 DIAGNOSIS — M79.89 LEG SWELLING: ICD-10-CM

## 2022-12-12 DIAGNOSIS — L89.313 PRESSURE ULCER OF RIGHT BUTTOCK, STAGE 3 (HCC): ICD-10-CM

## 2022-12-12 DIAGNOSIS — E11.621 DIABETIC ULCER OF TOE OF LEFT FOOT ASSOCIATED WITH TYPE 2 DIABETES MELLITUS, WITH FAT LAYER EXPOSED (HCC): ICD-10-CM

## 2022-12-12 DIAGNOSIS — I73.9 PAD (PERIPHERAL ARTERY DISEASE) (HCC): ICD-10-CM

## 2022-12-12 DIAGNOSIS — L97.522 DIABETIC ULCER OF TOE OF LEFT FOOT ASSOCIATED WITH TYPE 2 DIABETES MELLITUS, WITH FAT LAYER EXPOSED (HCC): ICD-10-CM

## 2022-12-12 DIAGNOSIS — L97.921 SKIN ULCER OF LEFT LOWER LEG, LIMITED TO BREAKDOWN OF SKIN (HCC): Primary | ICD-10-CM

## 2022-12-12 PROCEDURE — 11042 DBRDMT SUBQ TIS 1ST 20SQCM/<: CPT | Performed by: NURSE PRACTITIONER

## 2022-12-12 PROCEDURE — 11042 DBRDMT SUBQ TIS 1ST 20SQCM/<: CPT

## 2022-12-12 RX ORDER — LIDOCAINE 40 MG/G
CREAM TOPICAL ONCE
OUTPATIENT
Start: 2022-12-12 | End: 2022-12-12

## 2022-12-12 RX ORDER — BACITRACIN, NEOMYCIN, POLYMYXIN B 400; 3.5; 5 [USP'U]/G; MG/G; [USP'U]/G
OINTMENT TOPICAL ONCE
OUTPATIENT
Start: 2022-12-12 | End: 2022-12-12

## 2022-12-12 RX ORDER — GENTAMICIN SULFATE 1 MG/G
OINTMENT TOPICAL ONCE
OUTPATIENT
Start: 2022-12-12 | End: 2022-12-12

## 2022-12-12 RX ORDER — LIDOCAINE HYDROCHLORIDE 20 MG/ML
JELLY TOPICAL ONCE
OUTPATIENT
Start: 2022-12-12 | End: 2022-12-12

## 2022-12-12 RX ORDER — BACITRACIN ZINC AND POLYMYXIN B SULFATE 500; 1000 [USP'U]/G; [USP'U]/G
OINTMENT TOPICAL ONCE
OUTPATIENT
Start: 2022-12-12 | End: 2022-12-12

## 2022-12-12 RX ORDER — CLOBETASOL PROPIONATE 0.5 MG/G
OINTMENT TOPICAL ONCE
OUTPATIENT
Start: 2022-12-12 | End: 2022-12-12

## 2022-12-12 RX ORDER — LIDOCAINE HYDROCHLORIDE 40 MG/ML
SOLUTION TOPICAL ONCE
Status: COMPLETED | OUTPATIENT
Start: 2022-12-12 | End: 2022-12-12

## 2022-12-12 RX ORDER — GINSENG 100 MG
CAPSULE ORAL ONCE
OUTPATIENT
Start: 2022-12-12 | End: 2022-12-12

## 2022-12-12 RX ORDER — LIDOCAINE 50 MG/G
OINTMENT TOPICAL ONCE
OUTPATIENT
Start: 2022-12-12 | End: 2022-12-12

## 2022-12-12 RX ORDER — LIDOCAINE HYDROCHLORIDE 40 MG/ML
SOLUTION TOPICAL ONCE
OUTPATIENT
Start: 2022-12-12 | End: 2022-12-12

## 2022-12-12 RX ORDER — BETAMETHASONE DIPROPIONATE 0.05 %
OINTMENT (GRAM) TOPICAL ONCE
OUTPATIENT
Start: 2022-12-12 | End: 2022-12-12

## 2022-12-12 RX ADMIN — LIDOCAINE HYDROCHLORIDE: 40 SOLUTION TOPICAL at 14:28

## 2022-12-12 ASSESSMENT — PAIN SCALES - GENERAL: PAINLEVEL_OUTOF10: 0

## 2022-12-12 NOTE — DISCHARGE INSTRUCTIONS
[address] Physician Orders and Discharge Instructions  [address], [address]  Telephone: [phone]  [address] 8:30 am - 4:30 pm and Friday 8:30 am - 1:00 pm.          NAME:  Viri Calvo  YOB: 1970  MEDICAL RECORD NUMBER:  [id number]  DATE:  12/12/2022     Important Reminders:   Please wash hands with soap and water before and after every dressing change. Do not scrub wounds. Keep wounds dry in shower unless otherwise instructed by the physician. SMOKING can slow would healing. Stop smoking as soon as possible to improve healing and prevent further complications associated with smoking. DENSE FOAM - 3-4 IN      Lyssa-Wound Topical Treatments:    [x] Apply moisturizing lotion to skin surrounding the wound prior to dressing change.     WOUND LOCATION  BUTTOCKS     Wound Cleansing: Normal Saline     PRIMARY DRESSING:              [x] ALGINATE AG                  SECONDARY DRESSING:                [x] Gauze                                                [x] Cover Roll Tape                    HOW OFTEN TO CHANGE DRESSINGS:                [x] EVERY OTHER DAY OR Daily IF SOILED            Per Tam Tillman CNP                       _____________________________________________________________  WOUND LOCATION:   LEFT 3RD TOE WOUND    Wound Cleansing: Normal Saline      PRIMARY DRESSING:               [x] polysporin              SECONDARY DRESSING:                [x] Gauze                                               [x]  tape                      HOW OFTEN TO CHANGE DRESSINGS:             [] Daily                            [x] Three times per week:   [x] Monday- Wednesday- Friday    _____________________________________________________________________________________________________     WOUND LOCATION:   LEFT LOWER LEG  Wound Cleansing: Normal Saline        PRIMARY DRESSING:               [x] ALGINATE AG SECONDARY DRESSING:                [x] Gauze                                               [x] Rolled Gauze                           HOW OFTEN TO CHANGE DRESSINGS:             [] Daily                            [x] Three times per week:   [x] Monday- Wednesday- Friday         Compression and Edema Control:     [x] Ace Wrap STARTING FROM  Toes ALL THE WAY to THE Knee to Left Leg and Right Leg          Elevate leg(s) above the level of the heart when sitting. Avoid prolonged standing in one place.   ________________________________________________________________     Dietary:   Continue your diet as tolerated. Protein is a key nutrient in helping to repair damaged tissue and promote new tissue growth. Good sources of protein include milk, yogurt, cheese, fish, lean meat and beans. If you are DIABETIC, having diabetes can make it hard for wounds to heal. Try to keep your blood sugar within it's target range. Limit Sodium, Alcohol and Sugar.  _________________________________________________________  PRESSURE RELIEF AND OFF-LOADING:      Pressure Relief and Off Loading:  [x] Off-loading when       [] walking     [x] in bed       [x] sitting   Turn every 2 hours when in bed     Avoid putting direct pressure on the site of the wound. Limit side lying to 30 degree tilt. Limit elevating the head of the bed greater than 30 degrees. Activity: Activity as Tolerated     [] Assistive Devices     please continue to use any assistive devices advised by the provider discussed during your visit       [] Surgical shoe    [] Podus Boot(s)   [] Foam Boot(s)    [] Elaine Larry   ________________________________________________________________  PAIN:     Please note, A small amount of pain, drainage and/or bleeding from this process might be expected, and is NORMAL. Elevate the affected limb. Use over-the-counter medications you would normally use for pain as permitted by your family doctor.   For persistent pain not relieved by the above interventions, please call your family doctor. Call your doctor now or seek immediate medical care if:    You have symptoms of infection, such as: Increased pain, swelling, warmth, or redness. Red streaks leading from the area. Pus draining from the area. A fever. ________________________________________________________________     Return Appointment:     AMOS or Sai 113: [facility] is responsible to order your supplies. Return Appointment: With Dr Howard Damon  in  [address] [place] [address]) Wednesday                    [] Orders placed during your visit:            Electronically signed by : Willie Parham on 12/12/2022 at 2:33 PM       _______________________________________________________________  [address]: Should you experience any significant changes in your wound(s) or have questions about your wound care, please contact the [address] [place] [address] at [address] 8:30 am - 4:30 pm and Friday 8:30 am - 1:00 pm.  If you need help with your wound outside these hours and cannot wait until we are again available, contact your PCP or go to the hospital emergency room. PLEASE NOTE: IF YOU ARE UNABLE TO OBTAIN WOUND SUPPLIES, CONTINUE TO USE THE SUPPLIES YOU HAVE AVAILABLE UNTIL YOU ARE ABLE TO REACH US. KEEP THE WOUND COVERED AT ALL TIMES.            Physician Signature:_______________________     Date: ___________ Time:  ____________                     [x] Everett Rodriguez CNP

## 2022-12-13 NOTE — PROGRESS NOTES
[address]   Progress Note and Procedure Note      Sam Vick RECORD NUMBER:  [id number]  AGE: 45 y.o. GENDER: female  : 1970  EPISODE DATE:  2022    Subjective:     Chief Complaint   Patient presents with    Wound Check     Follow Up on Bilateral Lower Legs and Buttock         HISTORY of PRESENT ILLNESS HPI   Kanchan Art is a 45 y.o. female who presents today for wound/ulcer evaluation. History of Wound Context: Pressure ulcer to right buttock. Venous ulcers to bilateral LE's and pressure ulcer to left 4th toe, dorsal aspect. Right buttock and lower leg venous ulcers have been present since 10/1/22, while left 4th toe ulcer has been present since 22. Wearing nasal oxygen on visit. Today, wounds right leg closed. Pt is asking for Rx for diabetic shoes and needs trimming of toenails which are overgrown.   She has not seen a podiatrist.     Wound/Ulcer Pain Timing/Severity: none  Quality of pain: N/A  Severity:  0 / 10   Modifying Factors: None  Associated Signs/Symptoms: edema and drainage     Ulcer Identification:  Ulcer Type: venous, diabetic, and pressure     Contributing Factors: edema, venous stasis, and diabetes     Acute Wound: N/A not an acute wound  PAST MEDICAL HISTORY        Diagnosis Date    Anemia     Asthma     Cerebral artery occlusion with cerebral infarction (HCC)     CHF (congestive heart failure) (HCC)     COPD (chronic obstructive pulmonary disease) ([address]     Diabetes ([address]     Edema     Foot ulcer (HCC)     HTN (hypertension), benign 2013    Hyperlipidemia     PAD (peripheral artery disease) (HCC)     Pressure ulcer of right buttock, stage 3 ([address] 2022    Thyroid disease        PAST SURGICAL HISTORY    Past Surgical History:   Procedure Laterality Date    HERNIA REPAIR      TUBAL LIGATION      VASCULAR SURGERY Left     left arterial stent       FAMILY HISTORY    Family History   Problem Relation Age of Onset    COPD Mother     Early Death Brother     Cancer Maternal Aunt     Cancer Maternal Grandmother        SOCIAL HISTORY    Social History     Tobacco Use    Smoking status: Former     Packs/day: 1.00     Years: 25.00     Pack years: 25.00     Types: Cigarettes     Quit date:      Years since quittin.9    Smokeless tobacco: Never    Tobacco comments:     quit 3 weeks ago   Vaping Use    Vaping Use: Never used   Substance Use Topics    Alcohol use: No    Drug use: No       ALLERGIES    Allergies   Allergen Reactions    Codeine     Pcn [Penicillins]        MEDICATIONS    Current Outpatient Medications on File Prior to Encounter   Medication Sig Dispense Refill    tiotropium-olodaterol (STIOLTO) 2.5-2.5 MCG/ACT AERS Inhale 1 puff into the lungs in the morning and at bedtime      olmesartan (BENICAR) 40 MG tablet Take 1 tablet by mouth daily      torsemide (DEMADEX) 20 MG tablet Take 1 tablet by mouth daily      levothyroxine (SYNTHROID) 25 MCG tablet Take 1 tablet by mouth every morning (before breakfast)      albuterol sulfate HFA (PROVENTIL;VENTOLIN;PROAIR) 108 (90 Base) MCG/ACT inhaler Inhale 1 puff into the lungs every 4 hours as needed      insulin glargine (LANTUS SOLOSTAR) 100 UNIT/ML injection pen Inject 20 Units into the skin every morning (before breakfast)      atorvastatin (LIPITOR) 40 MG tablet Take 1 tablet by mouth nightly      loratadine (CLARITIN) 10 MG tablet Take 1 tablet by mouth daily as needed      insulin lispro (HUMALOG) 100 UNIT/ML SOLN injection vial Inject 7 Units into the skin 3 times daily (before meals)      carvedilol (COREG) 6.25 MG tablet Take 2 tablets by mouth 2 times daily (with meals)      Dulaglutide (TRULICITY) 2.00 EZ/9.4JP SOPN Inject 0.75 mg into the skin once a week      methadone (DOLOPHINE) 10 MG tablet Take 40 mg by mouth daily. No current facility-administered medications on file prior to encounter.        REVIEW OF SYSTEMS  Review of Systems    Pertinent items are noted in HPI. Objective:      BP (!) 162/77   Pulse 94   Temp 97.2 °F (36.2 °C) (Temporal)   Resp 18     Wt Readings from Last 3 Encounters:   11/28/22 193 lb 2 oz (87.6 kg)   11/23/22 191 lb 5.8 oz (86.8 kg)   11/22/22 198 lb (89.8 kg)       PHYSICAL EXAM  Physical Exam    General Appearance: alert and oriented to person, place and time, well-developed and well-nourished, in no acute distress  Skin: warm and dry  Head: normocephalic and atraumatic  Eyes: pupils equal, round, and reactive to light  Pulmonary/Chest: normal air movement, no respiratory distress, wearing portable oxygen  Cardiovascular: normal rate and regular rhythm      Assessment:        Problem List Items Addressed This Visit       Pressure ulcer of right buttock, stage 3 (HCC)    Skin ulcer of left lower leg, limited to breakdown of skin ([address] - Primary    Skin ulcer of right lower leg, limited to breakdown of skin (HCC)    Diabetic ulcer of toe of left foot associated with type 2 diabetes mellitus, with fat layer exposed ([address]    Leg swelling    PAD (peripheral artery disease) ([address]        Procedure Note  Indications:  Based on my examination of this patient's wound(s)/ulcer(s) today, debridement is required to promote healing and evaluate the wound base. Performed by: TOO Almaguer CNP    Consent obtained:  Yes    Time out taken:  Yes    Pain Control: Anesthetic  Anesthetic: 4% Lidocaine Liquid Topical       Debridement: Excisional Debridement    Using curette and forceps the wound(s)/ulcer(s) was/were debrided down through and including the removal of epidermis, dermis, and subcutaneous tissue.         Devitalized Tissue Debrided:  fibrin, biofilm, and slough    Pre Debridement Measurements:  Are located in the [place]  Documentation Flow Sheet    Diabetic/Pressure/Non Pressure Ulcers only:  Ulcer: Pressure ulcer, Stage 3     Wound/Ulcer #: 3, 4, and 5    Post Debridement Measurements:  Wound/Ulcer Descriptions are Pre Debridement except measurements:    Wound 11/28/22 Leg Right; Anterior;Proximal #1 ( since about 10/1/2022) (Active)   Wound Image   12/12/22 1410   Wound Etiology Venous 11/28/22 1458   Dressing/Treatment Alginate with Ag;Gauze dressing/dressing sponge;Roll gauze; Ace wrap 12/05/22 1528   Wound Length (cm) 0 cm 12/12/22 1410   Wound Width (cm) 0 cm 12/12/22 1410   Wound Depth (cm) 0 cm 12/12/22 1410   Wound Surface Area (cm^2) 0 cm^2 12/12/22 1410   Change in Wound Size % (l*w) 100 12/12/22 1410   Wound Volume (cm^3) 0 cm^3 12/12/22 1410   Wound Healing % 100 12/12/22 1410   Post-Procedure Length (cm) 0 cm 12/12/22 1435   Post-Procedure Width (cm) 0 cm 12/12/22 1435   Post-Procedure Depth (cm) 0 cm 12/12/22 1435   Post-Procedure Surface Area (cm^2) 0 cm^2 12/12/22 1435   Post-Procedure Volume (cm^3) 0 cm^3 12/12/22 1435   Wound Assessment Epithelialization 12/12/22 1410   Drainage Amount Small 12/05/22 1430   Drainage Description Serosanguinous 12/05/22 1430   Odor None 12/05/22 1430   Lyssa-wound Assessment Blanchable erythema;Dry/flaky 12/05/22 1430   Margins Attached edges 12/05/22 1430   Wound Thickness Description not for Pressure Injury Full thickness 12/05/22 1430   Number of days: 14       Wound 11/28/22 Leg Right; Anterior;Distal #2 ( since about 10/1/2022) (Active)   Wound Image   12/12/22 1410   Wound Etiology Venous 11/28/22 1458   Dressing/Treatment Alginate with Ag;Gauze dressing/dressing sponge;Roll gauze; Ace wrap 12/05/22 1528   Wound Length (cm) 0 cm 12/12/22 1410   Wound Width (cm) 0 cm 12/12/22 1410   Wound Depth (cm) 0 cm 12/12/22 1410   Wound Surface Area (cm^2) 0 cm^2 12/12/22 1410   Change in Wound Size % (l*w) 100 12/12/22 1410   Wound Volume (cm^3) 0 cm^3 12/12/22 1410   Wound Healing % 100 12/12/22 1410   Post-Procedure Length (cm) 0 cm 12/12/22 1435   Post-Procedure Width (cm) 0 cm 12/12/22 1435   Post-Procedure Depth (cm) 0 cm 12/12/22 1435   Post-Procedure Surface Area (cm^2) 0 cm^2 12/12/22 1435   Post-Procedure Volume (cm^3) 0 cm^3 12/12/22 1435   Wound Assessment Epithelialization 12/12/22 1410   Drainage Amount Small 12/05/22 1430   Drainage Description Serosanguinous 12/05/22 1430   Odor None 12/05/22 1430   Lyssa-wound Assessment Dry/flaky; Blanchable erythema 12/05/22 1430   Margins Undefined edges 12/05/22 1430   Wound Thickness Description not for Pressure Injury Full thickness 12/05/22 1430   Number of days: 14       Wound 11/28/22 Leg Left; Anterior #3 ( since about 10/1/2022) (Active)   Wound Image   12/05/22 1430   Wound Etiology Venous 11/28/22 1458   Dressing Status Old drainage noted 12/12/22 1410   Dressing/Treatment Alginate with Ag;Gauze dressing/dressing sponge;Roll gauze; Ace wrap 12/12/22 1445   Wound Length (cm) 0.9 cm 12/12/22 1410   Wound Width (cm) 1 cm 12/12/22 1410   Wound Depth (cm) 0.2 cm 12/12/22 1410   Wound Surface Area (cm^2) 0.9 cm^2 12/12/22 1410   Change in Wound Size % (l*w) 23.08 12/12/22 1410   Wound Volume (cm^3) 0.18 cm^3 12/12/22 1410   Wound Healing % 49 12/12/22 1410   Post-Procedure Length (cm) 1 cm 12/12/22 1435   Post-Procedure Width (cm) 1.1 cm 12/12/22 1435   Post-Procedure Depth (cm) 0.3 cm 12/12/22 1435   Post-Procedure Surface Area (cm^2) 1.1 cm^2 12/12/22 1435   Post-Procedure Volume (cm^3) 0.33 cm^3 12/12/22 1435   Wound Assessment Granulation tissue;Slough 12/12/22 1410   Drainage Amount Small 12/12/22 1410   Drainage Description Serosanguinous 12/12/22 1410   Odor None 12/12/22 1410   Lyssa-wound Assessment Dry/flaky; Blanchable erythema 12/12/22 1410   Margins Attached edges 12/12/22 1410   Wound Thickness Description not for Pressure Injury Full thickness 12/12/22 1410   Number of days: 14       Wound 11/28/22 Toe (Comment  which one) Left;Dorsal #4 left 4th toe ( since about 11/1/2022) (Active)   Wound Image   12/05/22 1430   Wound Etiology Other 11/28/22 1458   Dressing Status Old drainage noted 12/12/22 1410   Dressing/Treatment Antibacterial ointment;Gauze dressing/dressing sponge;Roll gauze 12/12/22 1445   Wound Length (cm) 0.3 cm 12/12/22 1410   Wound Width (cm) 0.8 cm 12/12/22 1410   Wound Depth (cm) 0.1 cm 12/12/22 1410   Wound Surface Area (cm^2) 0.24 cm^2 12/12/22 1410   Change in Wound Size % (l*w) -100 12/12/22 1410   Wound Volume (cm^3) 0.024 cm^3 12/12/22 1410   Wound Healing % -100 12/12/22 1410   Post-Procedure Length (cm) 0.4 cm 12/12/22 1435   Post-Procedure Width (cm) 0.9 cm 12/12/22 1435   Post-Procedure Depth (cm) 0.2 cm 12/12/22 1435   Post-Procedure Surface Area (cm^2) 0.36 cm^2 12/12/22 1435   Post-Procedure Volume (cm^3) 0.072 cm^3 12/12/22 1435   Wound Assessment Eschar dry 12/12/22 1410   Drainage Amount Small 12/12/22 1410   Drainage Description Serosanguinous 12/12/22 1410   Odor None 12/12/22 1410   Lyssa-wound Assessment Dry/flaky 12/12/22 1410   Margins Undefined edges 12/12/22 1410   Wound Thickness Description not for Pressure Injury Full thickness 12/12/22 1410   Number of days: 14       Wound 11/28/22 Buttocks Right #5 ( snce about 10/1/2022) (Active)   Wound Image   12/05/22 1426   Wound Etiology Pressure Stage 3 11/28/22 1458   Dressing Status Old drainage noted 12/12/22 1410   Dressing/Treatment Alginate with Ag;Gauze dressing/dressing sponge;Tape/Soft cloth adhesive tape;Barrier film 12/12/22 1445   Wound Length (cm) 5.5 cm 12/12/22 1410   Wound Width (cm) 3 cm 12/12/22 1410   Wound Depth (cm) 0.2 cm 12/12/22 1410   Wound Surface Area (cm^2) 16.5 cm^2 12/12/22 1410   Change in Wound Size % (l*w) 38.89 12/12/22 1410   Wound Volume (cm^3) 3.3 cm^3 12/12/22 1410   Wound Healing % 80 12/12/22 1410   Post-Procedure Length (cm) 5.6 cm 12/12/22 1435   Post-Procedure Width (cm) 3.1 cm 12/12/22 1435   Post-Procedure Depth (cm) 0.3 cm 12/12/22 1435   Post-Procedure Surface Area (cm^2) 17.36 cm^2 12/12/22 1435   Post-Procedure Volume (cm^3) 5.208 cm^3 12/12/22 1435   Wound Assessment Granulation tissue;Slough; Hyper granulation tissue 12/12/22 1410   Drainage Amount Moderate 12/12/22 1410   Drainage Description Green;Serosanguinous 12/12/22 1410   Odor None 12/12/22 1410   Lyssa-wound Assessment Dry/flaky 12/12/22 1410   Margins Attached edges 12/12/22 1410   Wound Thickness Description not for Pressure Injury Full thickness 12/12/22 1410   Number of days: 14          Total Surface Area Debrided:  18.82 sq cm     Estimated Blood Loss:  Minimal    Hemostasis Achieved:  by pressure    Procedural Pain:  1  / 10     Post Procedural Pain:  0 / 10     Response to treatment:  Well tolerated by patient. Plan:     Treatment Note please see attached Discharge Instructions    Written patient dismissal instructions given to patient and signed by patient or POA. Discharge [id number] [facility] Physician Orders and Discharge Instructions  [address] [place] [address]  Telephone: [phone] [address] 8:30 am - 4:30 pm and Friday 8:30 am - 1:00 pm.          NAME:  Zeenat Altamirano  YOB: 1970  MEDICAL RECORD NUMBER:  [id number]  DATE:  12/12/2022     Important Reminders:   Please wash hands with soap and water before and after every dressing change. Do not scrub wounds. Keep wounds dry in shower unless otherwise instructed by the physician. SMOKING can slow would healing. Stop smoking as soon as possible to improve healing and prevent further complications associated with smoking. DENSE FOAM - 3-4 IN      Lyssa-Wound Topical Treatments:    [x] Apply moisturizing lotion to skin surrounding the wound prior to dressing change.     WOUND LOCATION  BUTTOCKS     Wound Cleansing: Normal Saline     PRIMARY DRESSING:              [x] ALGINATE AG                  SECONDARY DRESSING:                [x] Gauze                                                [x] Cover Roll Tape                    HOW OFTEN TO CHANGE DRESSINGS: [x] EVERY OTHER DAY OR Daily IF SOILED            Per Danny Cappsabad MARLENA                       _____________________________________________________________  WOUND LOCATION:   LEFT 3RD TOE WOUND    Wound Cleansing: Normal Saline      PRIMARY DRESSING:               [x] polysporin              SECONDARY DRESSING:                [x] Gauze                                               [x]  tape                      HOW OFTEN TO CHANGE DRESSINGS:             [] Daily                            [x] Three times per week:   [x] Monday- Wednesday- Friday    _____________________________________________________________________________________________________     WOUND LOCATION:   LEFT LOWER LEG  Wound Cleansing: Normal Saline        PRIMARY DRESSING:               [x] ALGINATE AG                SECONDARY DRESSING:                [x] Gauze                                               [x] Rolled Gauze                           HOW OFTEN TO CHANGE DRESSINGS:             [] Daily                            [x] Three times per week:   [x] Monday- Wednesday- Friday         Compression and Edema Control:     [x] Ace Wrap STARTING FROM  Toes ALL THE WAY to THE Knee to Left Leg and Right Leg          Elevate leg(s) above the level of the heart when sitting. Avoid prolonged standing in one place.   ________________________________________________________________     Dietary:   Continue your diet as tolerated. Protein is a key nutrient in helping to repair damaged tissue and promote new tissue growth. Good sources of protein include milk, yogurt, cheese, fish, lean meat and beans. If you are DIABETIC, having diabetes can make it hard for wounds to heal. Try to keep your blood sugar within it's target range.   Limit Sodium, Alcohol and Sugar.  _________________________________________________________  PRESSURE RELIEF AND OFF-LOADING:      Pressure Relief and Off Loading:  [x] Off-loading when       [] walking     [x] in bed       [x] sitting   Turn every 2 hours when in bed     Avoid putting direct pressure on the site of the wound. Limit side lying to 30 degree tilt. Limit elevating the head of the bed greater than 30 degrees. Activity: Activity as Tolerated     [] Assistive Devices     please continue to use any assistive devices advised by the provider discussed during your visit       [] Surgical shoe    [] Podus Boot(s)   [] Foam Boot(s)    [] Lore Acuña   ________________________________________________________________  PAIN:     Please note, A small amount of pain, drainage and/or bleeding from this process might be expected, and is NORMAL. Elevate the affected limb. Use over-the-counter medications you would normally use for pain as permitted by your family doctor. For persistent pain not relieved by the above interventions, please call your family doctor. Call your doctor now or seek immediate medical care if:    You have symptoms of infection, such as: Increased pain, swelling, warmth, or redness. Red streaks leading from the area. Pus draining from the area. A fever. ________________________________________________________________     Return Appointment:     JAMES or Sai 113: [address] is responsible to order your supplies.       Return Appointment: With Dr Jesus Manuel Cuevas  in  1  [facility]) Wednesday                    [] Orders placed during your visit:            Electronically signed by : Catalina Mesa on 12/12/2022 at 2:33 PM       _______________________________________________________________  [facility] Information: Should you experience any significant changes in your wound(s) or have questions about your wound care, please contact the [address] [place] [address] at [address] 8:30 am - 4:30 pm and Friday 8:30 am - 1:00 pm.  If you need help with your wound outside these hours and cannot wait until we are again available, contact your PCP or go to the hospital emergency room. PLEASE NOTE: IF YOU ARE UNABLE TO OBTAIN WOUND SUPPLIES, CONTINUE TO USE THE SUPPLIES YOU HAVE AVAILABLE UNTIL YOU ARE ABLE TO REACH US. KEEP THE WOUND COVERED AT ALL TIMES.            Physician Signature:_______________________     Date: ___________ Time:  ____________                     [x] Sasha Balderas CNP          Electronically signed by TOO Brice CNP on 12/13/2022 at 10:47 AM

## 2022-12-21 ENCOUNTER — HOSPITAL ENCOUNTER (OUTPATIENT)
Dept: WOUND CARE | Age: 52
Discharge: HOME | End: 2022-12-21
Payer: COMMERCIAL

## 2022-12-21 VITALS
HEART RATE: 70 BPM | DIASTOLIC BLOOD PRESSURE: 77 MMHG | SYSTOLIC BLOOD PRESSURE: 167 MMHG | RESPIRATION RATE: 18 BRPM | TEMPERATURE: 98.1 F

## 2022-12-21 DIAGNOSIS — L89.313 PRESSURE ULCER OF RIGHT BUTTOCK, STAGE 3 (HCC): ICD-10-CM

## 2022-12-21 DIAGNOSIS — L97.911 SKIN ULCER OF RIGHT LOWER LEG, LIMITED TO BREAKDOWN OF SKIN (HCC): ICD-10-CM

## 2022-12-21 DIAGNOSIS — I73.9 PAD (PERIPHERAL ARTERY DISEASE) (HCC): Primary | ICD-10-CM

## 2022-12-21 DIAGNOSIS — L97.921 SKIN ULCER OF LEFT LOWER LEG, LIMITED TO BREAKDOWN OF SKIN (HCC): ICD-10-CM

## 2022-12-21 DIAGNOSIS — L97.522 DIABETIC ULCER OF TOE OF LEFT FOOT ASSOCIATED WITH TYPE 2 DIABETES MELLITUS, WITH FAT LAYER EXPOSED (HCC): ICD-10-CM

## 2022-12-21 DIAGNOSIS — E11.621 DIABETIC ULCER OF TOE OF LEFT FOOT ASSOCIATED WITH TYPE 2 DIABETES MELLITUS, WITH FAT LAYER EXPOSED (HCC): ICD-10-CM

## 2022-12-21 DIAGNOSIS — M79.89 LEG SWELLING: ICD-10-CM

## 2022-12-21 PROCEDURE — 11042 DBRDMT SUBQ TIS 1ST 20SQCM/<: CPT

## 2022-12-21 RX ORDER — LIDOCAINE HYDROCHLORIDE 20 MG/ML
JELLY TOPICAL ONCE
OUTPATIENT
Start: 2022-12-21 | End: 2022-12-21

## 2022-12-21 RX ORDER — GENTAMICIN SULFATE 1 MG/G
OINTMENT TOPICAL ONCE
OUTPATIENT
Start: 2022-12-21 | End: 2022-12-21

## 2022-12-21 RX ORDER — LIDOCAINE 50 MG/G
OINTMENT TOPICAL ONCE
OUTPATIENT
Start: 2022-12-21 | End: 2022-12-21

## 2022-12-21 RX ORDER — CLOBETASOL PROPIONATE 0.5 MG/G
OINTMENT TOPICAL ONCE
OUTPATIENT
Start: 2022-12-21 | End: 2022-12-21

## 2022-12-21 RX ORDER — LIDOCAINE HYDROCHLORIDE 40 MG/ML
SOLUTION TOPICAL ONCE
Status: COMPLETED | OUTPATIENT
Start: 2022-12-21 | End: 2022-12-21

## 2022-12-21 RX ORDER — BACITRACIN, NEOMYCIN, POLYMYXIN B 400; 3.5; 5 [USP'U]/G; MG/G; [USP'U]/G
OINTMENT TOPICAL ONCE
OUTPATIENT
Start: 2022-12-21 | End: 2022-12-21

## 2022-12-21 RX ORDER — LIDOCAINE 40 MG/G
CREAM TOPICAL ONCE
OUTPATIENT
Start: 2022-12-21 | End: 2022-12-21

## 2022-12-21 RX ORDER — GINSENG 100 MG
CAPSULE ORAL ONCE
OUTPATIENT
Start: 2022-12-21 | End: 2022-12-21

## 2022-12-21 RX ORDER — LIDOCAINE HYDROCHLORIDE 40 MG/ML
SOLUTION TOPICAL ONCE
OUTPATIENT
Start: 2022-12-21 | End: 2022-12-21

## 2022-12-21 RX ORDER — BACITRACIN ZINC AND POLYMYXIN B SULFATE 500; 1000 [USP'U]/G; [USP'U]/G
OINTMENT TOPICAL ONCE
OUTPATIENT
Start: 2022-12-21 | End: 2022-12-21

## 2022-12-21 RX ORDER — BETAMETHASONE DIPROPIONATE 0.05 %
OINTMENT (GRAM) TOPICAL ONCE
OUTPATIENT
Start: 2022-12-21 | End: 2022-12-21

## 2022-12-21 RX ADMIN — LIDOCAINE HYDROCHLORIDE 10 ML: 40 SOLUTION TOPICAL at 13:56

## 2022-12-21 ASSESSMENT — PAIN SCALES - GENERAL
PAINLEVEL_OUTOF10: 0
PAINLEVEL_OUTOF10: 0

## 2022-12-21 NOTE — DISCHARGE INSTRUCTIONS
Discharge 7 [facility] Physician Orders and Discharge Instructions  [address] [place] [address] Drive, [address]  Telephone: [phone] [address] 8:30 am - 4:30 pm and Friday 8:30 am - 1:00 pm.          NAME:  Kvng Samson  YOB: 1970  MEDICAL RECORD NUMBER:  [id number]  DATE:  12/21/2022     Important Reminders:   Please wash hands with soap and water before and after every dressing change. Do not scrub wounds. Keep wounds dry in shower unless otherwise instructed by the physician. SMOKING can slow would healing. Stop smoking as soon as possible to improve healing and prevent further complications associated with smoking. DENSE FOAM - 3-4 IN      Lyssa-Wound Topical Treatments:     [x] Apply moisturizing lotion to skin surrounding the wound prior to dressing change.                      _____________________________________________________________  WOUND LOCATION:   LEFT 4TH TOE WOUND    Wound Cleansing: Normal Saline      PRIMARY DRESSING:               [x]      LEAVE OPEN TO AIR         _____________________________________________________________________________________________________     WOUND LOCATION:   LEFT LOWER LEG  Wound Cleansing: Normal Saline        PRIMARY DRESSING:               [x] ALGINATE AG                SECONDARY DRESSING:                [x] Gauze                                               [x] Rolled Gauze                           HOW OFTEN TO CHANGE DRESSINGS:             [] Daily                            [x] Three times per week:   [x] Monday- Wednesday- Friday         Compression and Edema Control:     [x] Ace Wrap STARTING FROM  Toes ALL THE WAY to THE Knee to Left Leg and Right Leg          Elevate leg(s) above the level of the heart when sitting.      Avoid prolonged standing in one place.   ________________________________________________________________ Dietary:   Continue your diet as tolerated. Protein is a key nutrient in helping to repair damaged tissue and promote new tissue growth. Good sources of protein include milk, yogurt, cheese, fish, lean meat and beans. If you are DIABETIC, having diabetes can make it hard for wounds to heal. Try to keep your blood sugar within it's target range. Limit Sodium, Alcohol and Sugar.  _________________________________________________________  PRESSURE RELIEF AND OFF-LOADING:      Pressure Relief and Off Loading:  [x] Off-loading when       [] walking     [x] in bed       [x] sitting   Turn every 2 hours when in bed     Avoid putting direct pressure on the site of the wound. Limit side lying to 30 degree tilt. Limit elevating the head of the bed greater than 30 degrees. Activity: Activity as Tolerated     [] Assistive Devices     please continue to use any assistive devices advised by the provider discussed during your visit       [] Surgical shoe    [] Podus Boot(s)   [] Foam Boot(s)    [] Eloy Dowell   ________________________________________________________________  PAIN:     Please note, A small amount of pain, drainage and/or bleeding from this process might be expected, and is NORMAL. Elevate the affected limb. Use over-the-counter medications you would normally use for pain as permitted by your family doctor. For persistent pain not relieved by the above interventions, please call your family doctor. Call your doctor now or seek immediate medical care if:    You have symptoms of infection, such as: Increased pain, swelling, warmth, or redness. Red streaks leading from the area. Pus draining from the area. A fever. ________________________________________________________________     Return Appointment:     [facility] or [address]: 1230 [facility] is responsible to order your supplies.       Return Appointment: With Casey Junior CNP in  1  [facility]) Wednesday [] Orders placed during your visit:                Electronically signed by : Jennifer Gonsales on 12/21/2022 at 2:22 PM        _______________________________________________________________  215 [facility] Information: Should you experience any significant changes in your wound(s) or have questions about your wound care, please contact the [address] [place] [address] at [address] 8:30 am - 4:30 pm and Friday 8:30 am - 1:00 pm.  If you need help with your wound outside these hours and cannot wait until we are again available, contact your PCP or go to the hospital emergency room. PLEASE NOTE: IF YOU ARE UNABLE TO OBTAIN WOUND SUPPLIES, CONTINUE TO USE THE SUPPLIES YOU HAVE AVAILABLE UNTIL YOU ARE ABLE TO REACH US. KEEP THE WOUND COVERED AT ALL TIMES.            Physician Signature:_______________________     Date: ___________ Time:  ____________                     [] Joanne Wren CNP       [X] DR Desir

## 2022-12-21 NOTE — PROGRESS NOTES
[address]   Progress Note and Procedure Note      Sam Vick RECORD NUMBER:  [id number]  AGE: 45 y.o. GENDER: female  : 1970  EPISODE DATE:  2022    Subjective:     Chief Complaint   Patient presents with    Wound Check     Follow up visit bilat lower leg wounds         HISTORY of PRESENT ILLNESS HPI     Nina Monroe is a 45 y.o. female who presents today for wound/ulcer evaluation. History of Wound Context: Patient complains of a wound on top of her left leg and the top of her left fourth toe. Patient states [facility] is performing dressing changes as ordered. Patient states she has little to no pain from the wound. Patient also has a pressure wound on her right buttock being treated by Mehreen Hsu NP. Patient is an insulin dependent diabetic. Patient would also like to have her toenail trimmed. Patient states the toenails are long and thick which makes her toes ache.      Wound/Ulcer Pain Timing/Severity: intermittent, mild  Quality of pain: aching  Severity:  2 / 10   Modifying Factors: None  Associated Signs/Symptoms: pain    Ulcer Identification:  Ulcer Type: venous and diabetic    Contributing Factors: diabetes, chronic pressure, decreased mobility, and obesity    Acute Wound: N/A    PAST MEDICAL HISTORY        Diagnosis Date    Anemia     Asthma     Cerebral artery occlusion with cerebral infarction (HCC)     CHF (congestive heart failure) (HCC)     COPD (chronic obstructive pulmonary disease) ([address]     Diabetes ([address]     Edema     Foot ulcer ([address]     HTN (hypertension), benign 2013    Hyperlipidemia     PAD (peripheral artery disease) ([facility])     Pressure ulcer of right buttock, stage 3 ([address] 2022    Thyroid disease        PAST SURGICAL HISTORY    Past Surgical History:   Procedure Laterality Date    HERNIA REPAIR      TUBAL LIGATION      VASCULAR SURGERY Left     left arterial stent       FAMILY HISTORY    Family History Problem Relation Age of Onset    COPD Mother     Early Death Brother     Cancer Maternal Aunt     Cancer Maternal Grandmother        SOCIAL HISTORY    Social History     Tobacco Use    Smoking status: Former     Packs/day: 1.00     Years: 25.00     Pack years: 25.00     Types: Cigarettes     Quit date:      Years since quittin.9    Smokeless tobacco: Never    Tobacco comments:     quit 3 weeks ago   Vaping Use    Vaping Use: Never used   Substance Use Topics    Alcohol use: No    Drug use: No       ALLERGIES    Allergies   Allergen Reactions    Codeine     Pcn [Penicillins]        MEDICATIONS    Current Outpatient Medications on File Prior to Encounter   Medication Sig Dispense Refill    tiotropium-olodaterol (STIOLTO) 2.5-2.5 MCG/ACT AERS Inhale 1 puff into the lungs in the morning and at bedtime      olmesartan (BENICAR) 40 MG tablet Take 1 tablet by mouth daily      torsemide (DEMADEX) 20 MG tablet Take 1 tablet by mouth daily      levothyroxine (SYNTHROID) 25 MCG tablet Take 1 tablet by mouth every morning (before breakfast)      albuterol sulfate HFA (PROVENTIL;VENTOLIN;PROAIR) 108 (90 Base) MCG/ACT inhaler Inhale 1 puff into the lungs every 4 hours as needed      insulin glargine (LANTUS SOLOSTAR) 100 UNIT/ML injection pen Inject 20 Units into the skin every morning (before breakfast)      atorvastatin (LIPITOR) 40 MG tablet Take 1 tablet by mouth nightly      loratadine (CLARITIN) 10 MG tablet Take 1 tablet by mouth daily as needed      insulin lispro (HUMALOG) 100 UNIT/ML SOLN injection vial Inject 7 Units into the skin 3 times daily (before meals)      carvedilol (COREG) 6.25 MG tablet Take 2 tablets by mouth 2 times daily (with meals)      Dulaglutide (TRULICITY) 3.63 BARAKAT/5.0BE SOPN Inject 0.75 mg into the skin once a week      methadone (DOLOPHINE) 10 MG tablet Take 40 mg by mouth daily. No current facility-administered medications on file prior to encounter.        REVIEW OF SYSTEMS  Review of Systems    Pertinent items are noted in HPI. Objective:      BP (!) 167/77   Pulse 70   Temp 98.1 °F (36.7 °C) (Infrared)   Resp 18     Wt Readings from Last 3 Encounters:   11/28/22 193 lb 2 oz (87.6 kg)   11/23/22 191 lb 5.8 oz (86.8 kg)   11/22/22 198 lb (89.8 kg)       PHYSICAL EXAM  Physical Exam    Patient is alert and oriented x 3, and is in no acute distress. Vascular: DP and PT pulses palpable bilateral. CRT less than 4 seconds to toes 1-5 bilateral. No Pedal hair noted bilateral. Mild edema noted to both ankles. Derm:  Skin is warm to warm from proximal leg to distal foot bilateral. Toenails 1-5 bilateral are elongated, thickened, yellow and dystrophic with subungual debris. Neuro:  Protective sensation intact to 10/10 sites bilateral via a 5.07 [place] monofilament. Musculoskeletal: Muscle strength 5/5 with PF/DF/I and E of both feet. Full and stable ROM of 1st metatarsophalangeal joint bilateral without pain or crepitus.         Assessment:        Problem List Items Addressed This Visit          Circulatory    PAD (peripheral artery disease) ([address] - Primary    Relevant Orders    Initiate Outpatient Wound Care Protocol       Endocrine    Diabetic ulcer of toe of left foot associated with type 2 diabetes mellitus, with fat layer exposed ([address]    Relevant Orders    Initiate Outpatient Wound Care Protocol       Other    Pressure ulcer of right buttock, stage 3 ([address]    Relevant Orders    Initiate Outpatient Wound Care Protocol    Skin ulcer of left lower leg, limited to breakdown of skin ([address]    Relevant Orders    Initiate Outpatient Wound Care Protocol    Skin ulcer of right lower leg, limited to breakdown of skin [facility])    Relevant Orders    Initiate Outpatient Wound Care Protocol    Leg swelling    Relevant Orders    Initiate Outpatient Wound Care Protocol        Procedure Note  Indications:  Based on my examination of this patient's wound(s)/ulcer(s) today, debridement is required to promote healing and evaluate the wound base. Performed by: Librado Vanegas DPM    Consent obtained:  Yes    Time out taken:  Yes    Pain Control: Anesthetic  Anesthetic: 4% Lidocaine Liquid Topical       Debridement: Excisional Debridement    Using curette the wound(s)/ulcer(s) was/were debrided down through and including the removal of epidermis, dermis, and subcutaneous tissue. Devitalized Tissue Debrided:  fibrin and biofilm    Pre Debridement Measurements:  Are located in the Wound/Ulcer Documentation Flow Sheet    Diabetic/Pressure/Non Pressure Ulcers only:  Ulcer: Non-Pressure ulcer, fat layer exposed     Wound/Ulcer #: 3    Post Debridement Measurements:  Wound/Ulcer Descriptions are Pre Debridement except measurements:    Wound 11/28/22 Leg Left; Anterior #3 ( since about 10/1/2022) (Active)   Wound Image   12/05/22 1430   Wound Etiology Venous 11/28/22 1458   Dressing Status Old drainage noted 12/12/22 1410   Dressing/Treatment Alginate with Ag;Gauze dressing/dressing sponge;Roll gauze; Ace wrap 12/12/22 1445   Wound Length (cm) 0.6 cm 12/21/22 1403   Wound Width (cm) 0.6 cm 12/21/22 1403   Wound Depth (cm) 0.1 cm 12/21/22 1403   Wound Surface Area (cm^2) 0.36 cm^2 12/21/22 1403   Change in Wound Size % (l*w) 69.23 12/21/22 1403   Wound Volume (cm^3) 0.036 cm^3 12/21/22 1403   Wound Healing % 90 12/21/22 1403   Post-Procedure Length (cm) 0.7 cm 12/21/22 1419   Post-Procedure Width (cm) 0.7 cm 12/21/22 1419   Post-Procedure Depth (cm) 0.1 cm 12/21/22 1419   Post-Procedure Surface Area (cm^2) 0.49 cm^2 12/21/22 1419   Post-Procedure Volume (cm^3) 0.049 cm^3 12/21/22 1419   Wound Assessment Granulation tissue;Slough; Hyper granulation tissue 12/21/22 1403   Drainage Amount Small 12/21/22 1403   Drainage Description Serosanguinous 12/21/22 1403   Odor None 12/21/22 1403   Lyssa-wound Assessment Dry/flaky; Blanchable erythema 12/21/22 1403   Margins Attached edges 12/21/22 1403   Wound Thickness Description not for Pressure Injury Full thickness 12/21/22 1403   Number of days: 22       Wound 11/28/22 Toe (Comment  which one) Left;Dorsal #4 left 4th toe ( since about 11/1/2022) (Active)   Wound Image   12/05/22 1430   Wound Etiology Other 11/28/22 1458   Dressing Status Old drainage noted 12/12/22 1410   Dressing/Treatment Antibacterial ointment;Gauze dressing/dressing sponge;Roll gauze 12/12/22 1445   Wound Length (cm) 0.3 cm 12/21/22 1403   Wound Width (cm) 0.5 cm 12/21/22 1403   Wound Depth (cm) 0.1 cm 12/21/22 1403   Wound Surface Area (cm^2) 0.15 cm^2 12/21/22 1403   Change in Wound Size % (l*w) -25 12/21/22 1403   Wound Volume (cm^3) 0.015 cm^3 12/21/22 1403   Wound Healing % -25 12/21/22 1403   Post-Procedure Length (cm) 0.3 cm 12/21/22 1419   Post-Procedure Width (cm) 0.5 cm 12/21/22 1419   Post-Procedure Depth (cm) 0.1 cm 12/21/22 1419   Post-Procedure Surface Area (cm^2) 0.15 cm^2 12/21/22 1419   Post-Procedure Volume (cm^3) 0.015 cm^3 12/21/22 1419   Wound Assessment Dry;Slough 12/21/22 1403   Drainage Amount None 12/21/22 1403   Drainage Description Serosanguinous 12/12/22 1410   Odor None 12/21/22 1403   Lyssa-wound Assessment Dry/flaky 12/21/22 1403   Margins Undefined edges 12/21/22 1403   Wound Thickness Description not for Pressure Injury Full thickness 12/21/22 1403   Number of days: 22       Wound 11/28/22 Buttocks Right #5 ( snce about 10/1/2022) (Active)   Wound Image   12/05/22 1426   Wound Etiology Pressure Stage 3 11/28/22 1458   Dressing Status Old drainage noted 12/12/22 1410   Dressing/Treatment Alginate with Ag;Gauze dressing/dressing sponge;Tape/Soft cloth adhesive tape;Barrier film 12/12/22 1445   Wound Length (cm) 5.5 cm 12/12/22 1410   Wound Width (cm) 3 cm 12/12/22 1410   Wound Depth (cm) 0.2 cm 12/12/22 1410   Wound Surface Area (cm^2) 16.5 cm^2 12/12/22 1410   Change in Wound Size % (l*w) 38.89 12/12/22 1410   Wound Volume (cm^3) 3.3 cm^3 12/12/22 1410   Wound Healing % 80 12/12/22 1410   Post-Procedure Length (cm) 5.6 cm 12/12/22 1435   Post-Procedure Width (cm) 3.1 cm 12/12/22 1435   Post-Procedure Depth (cm) 0.3 cm 12/12/22 1435   Post-Procedure Surface Area (cm^2) 17.36 cm^2 12/12/22 1435   Post-Procedure Volume (cm^3) 5.208 cm^3 12/12/22 1435   Wound Assessment Granulation tissue;Slough; Hyper granulation tissue 12/12/22 1410   Drainage Amount Moderate 12/12/22 1410   Drainage Description Green;Serosanguinous 12/12/22 1410   Odor None 12/12/22 1410   Lyssa-wound Assessment Dry/flaky 12/12/22 1410   Margins Attached edges 12/12/22 1410   Wound Thickness Description not for Pressure Injury Full thickness 12/12/22 1410   Number of days: 22          Total Surface Area Debrided:  0.49 sq cm     Estimated Blood Loss:  Minimal    Hemostasis Achieved:  by pressure    Procedural Pain:  0  / 10     Post Procedural Pain:  0 / 10     Response to treatment:  Well tolerated by patient. Plan:   - Debrided patient's toenails 1-5 bilateral in thickness and in length using nail nippers. Treatment Note please see attached Discharge Instructions    Written patient dismissal instructions given to patient and signed by patient or POA. Discharge Instructions           Discharge [address] [place] [address] Physician Orders and Discharge Instructions  [address] [place] [address]  Telephone: [phone] [address] 8:30 am - 4:30 pm and Friday 8:30 am - 1:00 pm.          NAME:  Katie Vidales  YOB: 1970  MEDICAL RECORD NUMBER:  [id number]  DATE:  12/21/2022     Important Reminders:   Please wash hands with soap and water before and after every dressing change. Do not scrub wounds. Keep wounds dry in shower unless otherwise instructed by the physician. SMOKING can slow would healing.  Stop smoking as soon as possible to improve healing and prevent further complications associated with smoking. DENSE FOAM - 3-4 IN      Lyssa-Wound Topical Treatments:     [x] Apply moisturizing lotion to skin surrounding the wound prior to dressing change.                      _____________________________________________________________  WOUND LOCATION:   LEFT 4TH TOE WOUND    Wound Cleansing: Normal Saline      PRIMARY DRESSING:               [x]      LEAVE OPEN TO AIR         _____________________________________________________________________________________________________     WOUND LOCATION:   LEFT LOWER LEG  Wound Cleansing: Normal Saline        PRIMARY DRESSING:               [x] ALGINATE AG                SECONDARY DRESSING:                [x] Gauze                                               [x] Rolled Gauze                           HOW OFTEN TO CHANGE DRESSINGS:             [] Daily                            [x] Three times per week:   [x] Monday- Wednesday- Friday         Compression and Edema Control:     [x] Ace Wrap STARTING FROM  Toes ALL THE WAY to THE Knee to Left Leg and Right Leg          Elevate leg(s) above the level of the heart when sitting. Avoid prolonged standing in one place.   ________________________________________________________________     Dietary:   Continue your diet as tolerated. Protein is a key nutrient in helping to repair damaged tissue and promote new tissue growth. Good sources of protein include milk, yogurt, cheese, fish, lean meat and beans. If you are DIABETIC, having diabetes can make it hard for wounds to heal. Try to keep your blood sugar within it's target range. Limit Sodium, Alcohol and Sugar.  _________________________________________________________  PRESSURE RELIEF AND OFF-LOADING:      Pressure Relief and Off Loading:  [x] Off-loading when       [] walking     [x] in bed       [x] sitting   Turn every 2 hours when in bed     Avoid putting direct pressure on the site of the wound. Limit side lying to 30 degree tilt.  Limit elevating the head of the bed greater than 30 degrees. Activity: Activity as Tolerated     [] Assistive Devices     please continue to use any assistive devices advised by the provider discussed during your visit       [] Surgical shoe    [] Podus Boot(s)   [] Foam Boot(s)    [] Annika Duffel   ________________________________________________________________  PAIN:     Please note, A small amount of pain, drainage and/or bleeding from this process might be expected, and is NORMAL. Elevate the affected limb. Use over-the-counter medications you would normally use for pain as permitted by your family doctor. For persistent pain not relieved by the above interventions, please call your family doctor. Call your doctor now or seek immediate medical care if:    You have symptoms of infection, such as: Increased pain, swelling, warmth, or redness. Red streaks leading from the area. Pus draining from the area. A fever. ________________________________________________________________     Return Appointment:     [facility] or [facility]: 2108 [facility] is responsible to order your supplies. Return Appointment: With Harry Werner CNP in  1  Week(s) Wednesday                    [] Orders placed during your visit:                Electronically signed by : Fredrick Neil on 12/21/2022 at 2:22 PM        _______________________________________________________________  [facility] Information: Should you experience any significant changes in your wound(s) or have questions about your wound care, please contact the [address] [place] [address] at [address] 8:30 am - 4:30 pm and Friday 8:30 am - 1:00 pm.  If you need help with your wound outside these hours and cannot wait until we are again available, contact your PCP or go to the hospital emergency room.       PLEASE NOTE: IF YOU ARE UNABLE TO OBTAIN WOUND SUPPLIES, CONTINUE TO USE THE SUPPLIES YOU HAVE AVAILABLE UNTIL YOU ARE ABLE TO REACH US. KEEP THE WOUND COVERED AT ALL TIMES.            Physician Signature:_______________________     Date: ___________ Time:  ____________                     [] Ktae Vega CNP       [X] DR Valdivia           Electronically signed by Raoul Graham DPM on 12/21/2022 at 2:37 PM

## 2022-12-21 NOTE — DISCHARGE INSTRUCTIONS
Discharge 7 [facility] Physician Orders and Discharge Instructions  [address] [place] [address], [address], [address]  Telephone: [phone] [address] 8:30 am - 4:30 pm and Friday 8:30 am - 1:00 pm.          NAME:  Steele Lefort  YOB: 1970  MEDICAL RECORD NUMBER:  [id number]  DATE:  1/04/2023     Important Reminders:   Please wash hands with soap and water before and after every dressing change. Do not scrub wounds. Keep wounds dry in shower unless otherwise instructed by the physician. SMOKING can slow would healing. Stop smoking as soon as possible to improve healing and prevent further complications associated with smoking. DENSE FOAM - 3-4 IN      Lyssa-Wound Topical Treatments:     [x] Apply moisturizing lotion to skin surrounding the wound prior to dressing change.         WOUND LOCATION  BUTTOCKS     Wound Cleansing: Normal Saline     PRIMARY DRESSING:              [x] ALGINATE AG                  SECONDARY DRESSING:                [x] Gauze                                                [x] Cover Roll Tape                    HOW OFTEN TO CHANGE DRESSINGS:                [x] EVERY OTHER DAY OR Daily IF SOILED            Per Mary Mckeon CNP                            _____________________________________________________________  WOUND LOCATION:   LEFT 4TH TOE WOUND    Wound Cleansing: Normal Saline      PRIMARY DRESSING:               [x]      LEAVE OPEN TO AIR         _____________________________________________________________________________________________________     WOUND LOCATION:   LEFT LOWER LEG  Wound Cleansing: Normal Saline        PRIMARY DRESSING:               [x] ALGINATE AG                SECONDARY DRESSING:                [x] Gauze                                               [x] Rolled Gauze                           HOW OFTEN TO CHANGE DRESSINGS:             [] Daily [x] Three times per week:   [x] Monday- Wednesday- Friday         Compression and Edema Control:     [x] Ace Wrap STARTING FROM  Toes ALL THE WAY to THE Knee to Left Leg and Right Leg          Elevate leg(s) above the level of the heart when sitting. Avoid prolonged standing in one place.   ________________________________________________________________     Dietary:   Continue your diet as tolerated. Protein is a key nutrient in helping to repair damaged tissue and promote new tissue growth. Good sources of protein include milk, yogurt, cheese, fish, lean meat and beans. If you are DIABETIC, having diabetes can make it hard for wounds to heal. Try to keep your blood sugar within it's target range. Limit Sodium, Alcohol and Sugar.  _________________________________________________________  PRESSURE RELIEF AND OFF-LOADING:      Pressure Relief and Off Loading:  [x] Off-loading when       [] walking     [x] in bed       [x] sitting   Turn every 2 hours when in bed     Avoid putting direct pressure on the site of the wound. Limit side lying to 30 degree tilt. Limit elevating the head of the bed greater than 30 degrees. Activity: Activity as Tolerated     [] Assistive Devices     please continue to use any assistive devices advised by the provider discussed during your visit       [] Surgical shoe    [] Podus Boot(s)   [] Foam Boot(s)    [] Candance Piano   ________________________________________________________________  PAIN:     Please note, A small amount of pain, drainage and/or bleeding from this process might be expected, and is NORMAL. Elevate the affected limb. Use over-the-counter medications you would normally use for pain as permitted by your family doctor. For persistent pain not relieved by the above interventions, please call your family doctor. Call your doctor now or seek immediate medical care if:    You have symptoms of infection, such as:   Increased pain, swelling, warmth, or redness. Red streaks leading from the area. Pus draining from the area. A fever. ________________________________________________________________     Return Appointment:     [facility] or [facility]: [address] is responsible to order your supplies. Return Appointment: With James Carlson CNP in  1  Week(s) Wednesday                    [] Orders placed during your visit:                     _______________________________________________________________  [facility] Information: Should you experience any significant changes in your wound(s) or have questions about your wound care, please contact the [address] [place] [address] at [address] 8:30 am - 4:30 pm and Friday 8:30 am - 1:00 pm.  If you need help with your wound outside these hours and cannot wait until we are again available, contact your PCP or go to the hospital emergency room. PLEASE NOTE: IF YOU ARE UNABLE TO OBTAIN WOUND SUPPLIES, CONTINUE TO USE THE SUPPLIES YOU HAVE AVAILABLE UNTIL YOU ARE ABLE TO REACH US. KEEP THE WOUND COVERED AT ALL TIMES.            Physician Signature:_______________________     Date: ___________ Time:  ____________                     [x] Tamar Victor CNP       [ ] DR Radha Valenzuela

## 2022-12-30 NOTE — DISCHARGE SUMMARY
Patient left AMA within a few hours of discharge on 11/22/22. She apparently wanted to be at a hospital closer to home.

## 2023-01-04 ENCOUNTER — HOSPITAL ENCOUNTER (OUTPATIENT)
Dept: WOUND CARE | Age: 53
Discharge: HOME | End: 2023-01-04

## 2023-01-10 NOTE — DISCHARGE INSTRUCTIONS
[facility] [address] Physician Orders and Discharge Instructions  [address] [facility], [address], [address]  Telephone: [phone] [address] 8:30 am - 4:30 pm and Friday 8:30 am - 1:00 pm.          NAME:  Jarad Parry  YOB: 1970  MEDICAL RECORD NUMBER:  [id number]  DATE:  1/16/2023     Important Reminders:   Please wash hands with soap and water before and after every dressing change. Do not scrub wounds. Keep wounds dry in shower unless otherwise instructed by the physician. SMOKING can slow would healing. Stop smoking as soon as possible to improve healing and prevent further complications associated with smoking. DENSE FOAM - 3-4 IN      Lyssa-Wound Topical Treatments:     [x] Apply moisturizing lotion to skin surrounding the wound prior to dressing change.                      _____________________________________________________________  WOUND LOCATION:   LEFT 4TH TOE WOUND    Wound Cleansing: Normal Saline      PRIMARY DRESSING:               [x]      LEAVE OPEN TO AIR         _____________________________________________________________________________________________________     WOUND LOCATION:   LEFT LOWER LEG  Wound Cleansing: Normal Saline        PRIMARY DRESSING:               [x] ALGINATE AG                SECONDARY DRESSING:                [x] Gauze                                               [x] Rolled Gauze                           HOW OFTEN TO CHANGE DRESSINGS:             [] Daily                            [x] Three times per week:   [x] Monday- Wednesday- Friday         Compression and Edema Control:     [x] Ace Wrap STARTING FROM  Toes ALL THE WAY to THE Knee to Left Leg and Right Leg          Elevate leg(s) above the level of the heart when sitting.      Avoid prolonged standing in one place.   ________________________________________________________________     Dietary:   Continue your diet as tolerated. Protein is a reyes nutrient in helping to repair damaged tissue and promote new tissue growth. Good sources of protein include milk, yogurt, cheese, fish, lean meat and beans. If you are DIABETIC, having diabetes can make it hard for wounds to heal. Try to keep your blood sugar within it's target range. Limit Sodium, Alcohol and Sugar.  _________________________________________________________  PRESSURE RELIEF AND OFF-LOADING:      Pressure Relief and Off Loading:  [x] Off-loading when       [] walking     [x] in bed       [x] sitting   Turn every 2 hours when in bed     Avoid putting direct pressure on the site of the wound. Limit side lying to 30 degree tilt. Limit elevating the head of the bed greater than 30 degrees. Activity: Activity as Tolerated     [] Assistive Devices     please continue to use any assistive devices advised by the provider discussed during your visit       [] Surgical shoe    [] Podus Boot(s)   [] Foam Boot(s)    [] Elvis Pretzel   ________________________________________________________________  PAIN:     Please note, A small amount of pain, drainage and/or bleeding from this process might be expected, and is NORMAL. Elevate the affected limb. Use over-the-counter medications you would normally use for pain as permitted by your family doctor. For persistent pain not relieved by the above interventions, please call your family doctor. Call your doctor now or seek immediate medical care if:    You have symptoms of infection, such as: Increased pain, swelling, warmth, or redness. Red streaks leading from the area. Pus draining from the area. A fever. ________________________________________________________________     Return Appointment:     [facility] or [facility]: [facility] is responsible to order your supplies.       Return Appointment: With Hope Ochoa CNP in  1  Week(s) Wednesday                    [] Orders placed during your visit:                     _______________________________________________________________  [facility] Information: Should you experience any significant changes in your wound(s) or have questions about your wound care, please contact the [address] [place] [address] at [address] 8:30 am - 4:30 pm and Friday 8:30 am - 1:00 pm.  If you need help with your wound outside these hours and cannot wait until we are again available, contact your PCP or go to the hospital emergency room. PLEASE NOTE: IF YOU ARE UNABLE TO OBTAIN WOUND SUPPLIES, CONTINUE TO USE THE SUPPLIES YOU HAVE AVAILABLE UNTIL YOU ARE ABLE TO REACH US. KEEP THE WOUND COVERED AT ALL TIMES.            Physician Signature:_______________________     Date: ___________ Time:  ____________                     [x] Wisam Dickinson CNP       [] DR Valdivia

## 2023-01-16 ENCOUNTER — HOSPITAL ENCOUNTER (OUTPATIENT)
Dept: WOUND CARE | Age: 53
Discharge: HOME | End: 2023-01-16

## 2023-01-19 NOTE — DISCHARGE INSTRUCTIONS
[address] [place] [address] Drive Physician Orders and Discharge Instructions  [address], [address]  Telephone: [phone]  [address] 8:30 am - 4:30 pm and Friday 8:30 am - 1:00 pm.          NAME:  Joe Escalante  YOB: 1970  MEDICAL RECORD NUMBER:  [id number]  DATE:  1/23/2023     Important Reminders:   Please wash hands with soap and water before and after every dressing change. Do not scrub wounds. Keep wounds dry in shower unless otherwise instructed by the physician. SMOKING can slow would healing. Stop smoking as soon as possible to improve healing and prevent further complications associated with smoking. DENSE FOAM - 3-4 IN      Lyssa-Wound Topical Treatments:     [x] Apply moisturizing lotion to skin surrounding the wound prior to dressing change.                      _____________________________________________________________  WOUND LOCATION:   LEFT 4TH TOE WOUND    Wound Cleansing: Normal Saline      PRIMARY DRESSING:               [x]     BETADINE, LET DRY              Compression and Edema Control:     [x] Ace Wrap STARTING FROM BASE OF Toes ALL THE WAY to THE Knee to Left Leg and Right Leg          Elevate leg(s) above the level of the heart when sitting. Avoid prolonged standing in one place.   ___________________________________________________       WOUND LOCATION:   RIGHT BUTTOCKS   Wound Cleansing: Normal Saline        PRIMARY DRESSING:               [x] ALGINATE AG                SECONDARY DRESSING:                [x] MEPILEX BORDER/ SILICONE BORDER/ OR GAUZE AND TAPE                          HOW OFTEN TO CHANGE DRESSINGS:             [] Daily                            [x] Three times per week:   [x] Monday- Wednesday- Friday      _____________________________________________________________________________________     Dietary:   Continue your diet as tolerated.   Protein is a key nutrient in helping to repair damaged tissue and promote new tissue growth. Good sources of protein include milk, yogurt, cheese, fish, lean meat and beans. If you are DIABETIC, having diabetes can make it hard for wounds to heal. Try to keep your blood sugar within it's target range. Limit Sodium, Alcohol and Sugar.  _________________________________________________________  PRESSURE RELIEF AND OFF-LOADING:      Pressure Relief and Off Loading:  [x] Off-loading when       [] walking     [x] in bed       [x] sitting   Turn every 2 hours when in bed     Avoid putting direct pressure on the site of the wound. Limit side lying to 30 degree tilt. Limit elevating the head of the bed greater than 30 degrees. Activity: Activity as Tolerated     [] Assistive Devices     please continue to use any assistive devices advised by the provider discussed during your visit       [] Surgical shoe    [] Podus Boot(s)   [] Foam Boot(s)    [] Audrey Arriola   ________________________________________________________________  PAIN:     Please note, A small amount of pain, drainage and/or bleeding from this process might be expected, and is NORMAL. Elevate the affected limb. Use over-the-counter medications you would normally use for pain as permitted by your family doctor. For persistent pain not relieved by the above interventions, please call your family doctor. Call your doctor now or seek immediate medical care if:    You have symptoms of infection, such as: Increased pain, swelling, warmth, or redness. Red streaks leading from the area. Pus draining from the area. A fever. ________________________________________________________________     Return Appointment:     [facility]: [address] [facility] is responsible to order your supplies.       Return Appointment: With Reina Gilmore CNP in  [facility])                    [] Orders placed during your visit:                Electronically signed by : Shirley Adamestacey on 1/23/2023 at 2:51 PM        _______________________________________________________________  [address]: Should you experience any significant changes in your wound(s) or have questions about your wound care, please contact the [address] [place] [address] at [address] 8:30 am - 4:30 pm and Friday 8:30 am - 1:00 pm.  If you need help with your wound outside these hours and cannot wait until we are again available, contact your PCP or go to the hospital emergency room. PLEASE NOTE: IF YOU ARE UNABLE TO OBTAIN WOUND SUPPLIES, CONTINUE TO USE THE SUPPLIES YOU HAVE AVAILABLE UNTIL YOU ARE ABLE TO REACH US. KEEP THE WOUND COVERED AT ALL TIMES.            Physician Signature:_______________________     Date: ___________ Time:  ____________                     [x] Rob Mackay CNP       []  [id number] [id number] [address]

## 2023-01-23 ENCOUNTER — HOSPITAL ENCOUNTER (OUTPATIENT)
Dept: WOUND CARE | Age: 53
Discharge: HOME | End: 2023-01-23
Payer: COMMERCIAL

## 2023-01-23 VITALS
HEART RATE: 71 BPM | TEMPERATURE: 97.2 F | RESPIRATION RATE: 18 BRPM | DIASTOLIC BLOOD PRESSURE: 82 MMHG | SYSTOLIC BLOOD PRESSURE: 152 MMHG

## 2023-01-23 DIAGNOSIS — L89.313 PRESSURE ULCER OF RIGHT BUTTOCK, STAGE 3 (HCC): ICD-10-CM

## 2023-01-23 DIAGNOSIS — E11.621 DIABETIC ULCER OF TOE OF LEFT FOOT ASSOCIATED WITH TYPE 2 DIABETES MELLITUS, WITH FAT LAYER EXPOSED (HCC): ICD-10-CM

## 2023-01-23 DIAGNOSIS — I73.9 PAD (PERIPHERAL ARTERY DISEASE) (HCC): ICD-10-CM

## 2023-01-23 DIAGNOSIS — M79.89 LEG SWELLING: ICD-10-CM

## 2023-01-23 DIAGNOSIS — L97.522 DIABETIC ULCER OF TOE OF LEFT FOOT ASSOCIATED WITH TYPE 2 DIABETES MELLITUS, WITH FAT LAYER EXPOSED (HCC): ICD-10-CM

## 2023-01-23 DIAGNOSIS — L97.911 SKIN ULCER OF RIGHT LOWER LEG, LIMITED TO BREAKDOWN OF SKIN (HCC): ICD-10-CM

## 2023-01-23 DIAGNOSIS — L97.921 SKIN ULCER OF LEFT LOWER LEG, LIMITED TO BREAKDOWN OF SKIN (HCC): Primary | ICD-10-CM

## 2023-01-23 PROCEDURE — 11042 DBRDMT SUBQ TIS 1ST 20SQCM/<: CPT | Performed by: NURSE PRACTITIONER

## 2023-01-23 PROCEDURE — 11042 DBRDMT SUBQ TIS 1ST 20SQCM/<: CPT

## 2023-01-23 RX ORDER — LIDOCAINE 40 MG/G
CREAM TOPICAL ONCE
Status: COMPLETED | OUTPATIENT
Start: 2023-01-23 | End: 2023-01-23

## 2023-01-23 RX ORDER — BETAMETHASONE DIPROPIONATE 0.05 %
OINTMENT (GRAM) TOPICAL ONCE
OUTPATIENT
Start: 2023-01-23 | End: 2023-01-23

## 2023-01-23 RX ORDER — BACITRACIN ZINC AND POLYMYXIN B SULFATE 500; 1000 [USP'U]/G; [USP'U]/G
OINTMENT TOPICAL ONCE
OUTPATIENT
Start: 2023-01-23 | End: 2023-01-23

## 2023-01-23 RX ORDER — GENTAMICIN SULFATE 1 MG/G
OINTMENT TOPICAL ONCE
OUTPATIENT
Start: 2023-01-23 | End: 2023-01-23

## 2023-01-23 RX ORDER — GINSENG 100 MG
CAPSULE ORAL ONCE
OUTPATIENT
Start: 2023-01-23 | End: 2023-01-23

## 2023-01-23 RX ORDER — LIDOCAINE HYDROCHLORIDE 20 MG/ML
JELLY TOPICAL ONCE
OUTPATIENT
Start: 2023-01-23 | End: 2023-01-23

## 2023-01-23 RX ORDER — LIDOCAINE 50 MG/G
OINTMENT TOPICAL ONCE
OUTPATIENT
Start: 2023-01-23 | End: 2023-01-23

## 2023-01-23 RX ORDER — CLOBETASOL PROPIONATE 0.5 MG/G
OINTMENT TOPICAL ONCE
OUTPATIENT
Start: 2023-01-23 | End: 2023-01-23

## 2023-01-23 RX ORDER — LIDOCAINE 40 MG/G
CREAM TOPICAL ONCE
OUTPATIENT
Start: 2023-01-23 | End: 2023-01-23

## 2023-01-23 RX ORDER — BACITRACIN, NEOMYCIN, POLYMYXIN B 400; 3.5; 5 [USP'U]/G; MG/G; [USP'U]/G
OINTMENT TOPICAL ONCE
OUTPATIENT
Start: 2023-01-23 | End: 2023-01-23

## 2023-01-23 RX ORDER — LIDOCAINE HYDROCHLORIDE 40 MG/ML
SOLUTION TOPICAL ONCE
OUTPATIENT
Start: 2023-01-23 | End: 2023-01-23

## 2023-01-23 RX ADMIN — LIDOCAINE: 40 CREAM TOPICAL at 14:44

## 2023-01-23 ASSESSMENT — PAIN SCALES - GENERAL
PAINLEVEL_OUTOF10: 0
PAINLEVEL_OUTOF10: 0

## 2023-01-24 NOTE — PROGRESS NOTES
[address]   Progress Note and Procedure Note      Sam Vick RECORD NUMBER:  [id number]  AGE: 52 y.o. GENDER: female  : 1970  EPISODE DATE:  2023    Subjective:     Chief Complaint   Patient presents with    Wound Check     Follow-up visit for wounds to the left leg, left toe and buttocks. HISTORY of PRESENT ILLNESS HPI  Cinthia Dsouza is a 45 y.o. female who presents today for wound/ulcer evaluation. History of Wound Context: Pressure ulcer to right buttock. Venous ulcers to bilateral LE's and pressure ulcer to left 4th toe, dorsal aspect. Right buttock and lower leg venous ulcers have been present since 10/1/22, while left 4th toe ulcer has been present since 22. Wearing nasal oxygen on visit, 2 L/min. Today, wounds right leg closed. Pt is asking for Rx for diabetic shoes and needs trimming of toenails which are overgrown. Pt saw podiatrist at end of December.      Wound/Ulcer Pain Timing/Severity: none  Quality of pain: N/A  Severity:  0 / 10   Modifying Factors: None  Associated Signs/Symptoms: edema and drainage     Ulcer Identification:  Ulcer Type: venous, diabetic, and pressure     Contributing Factors: edema, venous stasis, and diabetes     Acute Wound: N/A not an acute wound    PAST MEDICAL HISTORY        Diagnosis Date    Anemia     Asthma     Cerebral artery occlusion with cerebral infarction (HCC)     CHF (congestive heart failure) ([facility])     COPD (chronic obstructive pulmonary disease) ([address]     Diabetes [facility]     Edema     Foot ulcer ([facility]     HTN (hypertension), benign 2013    Hyperlipidemia     PAD (peripheral artery disease) ([facility])     Pressure ulcer of right buttock, stage 3 ([address] 2022    Thyroid disease        PAST SURGICAL HISTORY    Past Surgical History:   Procedure Laterality Date    HERNIA REPAIR      TUBAL LIGATION      VASCULAR SURGERY Left     left arterial stent       FAMILY HISTORY    Family History   Problem Relation Age of Onset    COPD Mother     Early Death Brother     Cancer Maternal Aunt     Cancer Maternal Grandmother        SOCIAL HISTORY    Social History     Tobacco Use    Smoking status: Former     Packs/day: 1.00     Years: 25.00     Pack years: 25.00     Types: Cigarettes     Quit date:      Years since quittin.0    Smokeless tobacco: Never    Tobacco comments:     quit 3 weeks ago   Vaping Use    Vaping Use: Never used   Substance Use Topics    Alcohol use: No    Drug use: No       ALLERGIES    Allergies   Allergen Reactions    Codeine     Pcn [Penicillins]        MEDICATIONS    Current Outpatient Medications on File Prior to Encounter   Medication Sig Dispense Refill    tiotropium-olodaterol (STIOLTO) 2.5-2.5 MCG/ACT AERS Inhale 1 puff into the lungs in the morning and at bedtime      olmesartan (BENICAR) 40 MG tablet Take 1 tablet by mouth daily      torsemide (DEMADEX) 20 MG tablet Take 1 tablet by mouth daily      levothyroxine (SYNTHROID) 25 MCG tablet Take 1 tablet by mouth every morning (before breakfast)      albuterol sulfate HFA (PROVENTIL;VENTOLIN;PROAIR) 108 (90 Base) MCG/ACT inhaler Inhale 1 puff into the lungs every 4 hours as needed      insulin glargine (LANTUS SOLOSTAR) 100 UNIT/ML injection pen Inject 20 Units into the skin every morning (before breakfast)      atorvastatin (LIPITOR) 40 MG tablet Take 1 tablet by mouth nightly      loratadine (CLARITIN) 10 MG tablet Take 1 tablet by mouth daily as needed      insulin lispro (HUMALOG) 100 UNIT/ML SOLN injection vial Inject 7 Units into the skin 3 times daily (before meals)      carvedilol (COREG) 6.25 MG tablet Take 2 tablets by mouth 2 times daily (with meals)      Dulaglutide (TRULICITY) 4.75 VC/8.4FM SOPN Inject 0.75 mg into the skin once a week      methadone (DOLOPHINE) 10 MG tablet Take 40 mg by mouth daily. No current facility-administered medications on file prior to encounter. REVIEW OF SYSTEMS  Review of Systems    Pertinent items are noted in HPI. Objective:      BP (!) 152/82   Pulse 71   Temp 97.2 °F (36.2 °C) (Temporal)   Resp 18     Wt Readings from Last 3 Encounters:   11/28/22 193 lb 2 oz (87.6 kg)   11/23/22 191 lb 5.8 oz (86.8 kg)   11/22/22 198 lb (89.8 kg)       PHYSICAL EXAM  Physical Exam    General Appearance: alert and oriented to person, place and time  Skin: warm and dry  Head: normocephalic and atraumatic  Eyes: pupils equal, round, and reactive to light  Pulmonary/Chest:  normal air movement, no respiratory distress  Cardiovascular: normal rate and regular rhythm  Wounds: wound size and presentation significantly improved      Assessment:        Problem List Items Addressed This Visit       Pressure ulcer of right buttock, stage 3 (HCC)    Skin ulcer of left lower leg, limited to breakdown of skin ([address] - Primary    Skin ulcer of right lower leg, limited to breakdown of skin (HCC)    Diabetic ulcer of toe of left foot associated with type 2 diabetes mellitus, with fat layer exposed ([address]    Leg swelling    PAD (peripheral artery disease) ([address]        Procedure Note  Indications:  Based on my examination of this patient's wound(s)/ulcer(s) today, debridement is required to promote healing and evaluate the wound base. Performed by: TOO Stringer - CNP    Consent obtained:  Yes    Time out taken:  Yes    Pain Control: Anesthetic  Anesthetic: 4% Lidocaine Cream       Debridement: Excisional Debridement    Using curette the wound(s)/ulcer(s) was/were debrided down through and including the removal of epidermis, dermis, and subcutaneous tissue.         Devitalized Tissue Debrided:  fibrin, biofilm, and slough    Pre Debridement Measurements:  Are located in the [place]  Documentation Flow Sheet    Diabetic/Pressure/Non Pressure Ulcers only:  Ulcer: Non-Pressure ulcer, fat layer exposed     Wound/Ulcer #: 2 and 5    Post Debridement Measurements:  Wound/Ulcer Descriptions are Pre Debridement except measurements:    Wound 11/28/22 Leg Left; Anterior #3 ( since about 10/1/2022) (Active)   Wound Image   01/23/23 1422   Wound Etiology Venous 11/28/22 1458   Dressing Status Dry; Intact 01/23/23 1422   Dressing/Treatment Alginate with Ag;Gauze dressing/dressing sponge;Roll gauze 12/21/22 1442   Wound Length (cm) 0 cm 01/23/23 1422   Wound Width (cm) 0 cm 01/23/23 1422   Wound Depth (cm) 0 cm 01/23/23 1422   Wound Surface Area (cm^2) 0 cm^2 01/23/23 1422   Change in Wound Size % (l*w) 100 01/23/23 1422   Wound Volume (cm^3) 0 cm^3 01/23/23 1422   Wound Healing % 100 01/23/23 1422   Post-Procedure Length (cm) 0 cm 01/23/23 1449   Post-Procedure Width (cm) 0 cm 01/23/23 1449   Post-Procedure Depth (cm) 0 cm 01/23/23 1449   Post-Procedure Surface Area (cm^2) 0 cm^2 01/23/23 1449   Post-Procedure Volume (cm^3) 0 cm^3 01/23/23 1449   Wound Assessment Epithelialization 01/23/23 1422   Drainage Amount None 01/23/23 1422   Drainage Description Serosanguinous 12/21/22 1403   Odor None 12/21/22 1403   Lyssa-wound Assessment Dry/flaky 01/23/23 1422   Margins Attached edges 12/21/22 1403   Wound Thickness Description not for Pressure Injury Full thickness 12/21/22 1403   Number of days: 56       Wound 11/28/22 Toe (Comment  which one) Left;Dorsal #4 left 4th toe ( since about 11/1/2022) (Active)   Wound Image   01/23/23 1422   Wound Etiology Other 11/28/22 1458   Dressing Status Old drainage noted 01/23/23 1422   Dressing/Treatment Open to air;Betadine swabs/povidone iodine 01/23/23 1539   Wound Length (cm) 0.2 cm 01/23/23 1422   Wound Width (cm) 0.2 cm 01/23/23 1422   Wound Depth (cm) 0.2 cm 01/23/23 1422   Wound Surface Area (cm^2) 0.04 cm^2 01/23/23 1422   Change in Wound Size % (l*w) 66.67 01/23/23 1422   Wound Volume (cm^3) 0.008 cm^3 01/23/23 1422   Wound Healing % 33 01/23/23 1422   Post-Procedure Length (cm) 0.3 cm 01/23/23 1449   Post-Procedure Width (cm) 0.3 cm 01/23/23 1449   Post-Procedure Depth (cm) 0.3 cm 01/23/23 1449   Post-Procedure Surface Area (cm^2) 0.09 cm^2 01/23/23 1449   Post-Procedure Volume (cm^3) 0.027 cm^3 01/23/23 1449   Wound Assessment Dry;Slough 01/23/23 1422   Drainage Amount Scant 01/23/23 1422   Drainage Description Serous 01/23/23 1422   Odor None 01/23/23 1422   Lyssa-wound Assessment Dry/flaky 01/23/23 1422   Margins Undefined edges 01/23/23 1422   Wound Thickness Description not for Pressure Injury Full thickness 01/23/23 1422   Number of days: 56       Wound 11/28/22 Buttocks Right #5 ( snce about 10/1/2022) (Active)   Wound Image   01/23/23 1422   Wound Etiology Pressure Stage 3 11/28/22 1458   Dressing Status Old drainage noted 01/23/23 1422   Dressing/Treatment Alginate with Ag;Silicone border 94/32/02 1539   Wound Length (cm) 5 cm 01/23/23 1422   Wound Width (cm) 1.3 cm 01/23/23 1422   Wound Depth (cm) 0.1 cm 01/23/23 1422   Wound Surface Area (cm^2) 6.5 cm^2 01/23/23 1422   Change in Wound Size % (l*w) 75.93 01/23/23 1422   Wound Volume (cm^3) 0.65 cm^3 01/23/23 1422   Wound Healing % 96 01/23/23 1422   Post-Procedure Length (cm) 5.1 cm 01/23/23 1449   Post-Procedure Width (cm) 1.4 cm 01/23/23 1449   Post-Procedure Depth (cm) 0.2 cm 01/23/23 1449   Post-Procedure Surface Area (cm^2) 7.14 cm^2 01/23/23 1449   Post-Procedure Volume (cm^3) 1.428 cm^3 01/23/23 1449   Wound Assessment Slough;Granulation tissue 01/23/23 1422   Drainage Amount Moderate 01/23/23 1422   Drainage Description Serosanguinous 01/23/23 1422   Odor Moderate 01/23/23 1422   Lyssa-wound Assessment Dry/flaky 01/23/23 1422   Margins Attached edges 01/23/23 1422   Wound Thickness Description not for Pressure Injury Full thickness 01/23/23 1422   Number of days: 56          Total Surface Area Debrided:  7.23 sq cm     Estimated Blood Loss:  Minimal    Hemostasis Achieved:  not needed    Procedural Pain:  1  / 10     Post Procedural Pain:  0 / 10     Response to treatment:  Well tolerated by patient. Plan:     Treatment Note please see attached Discharge Instructions    Written patient dismissal instructions given to patient and signed by patient or POA. Discharge [id number] [facility] Physician Orders and Discharge Instructions  [facility], [address]  Telephone: [phone] [address] 8:30 am - 4:30 pm and Friday 8:30 am - 1:00 pm.          NAME:  China Shahid  YOB: 1970  MEDICAL RECORD NUMBER:  [id number]  DATE:  1/23/2023     Important Reminders:   Please wash hands with soap and water before and after every dressing change. Do not scrub wounds. Keep wounds dry in shower unless otherwise instructed by the physician. SMOKING can slow would healing. Stop smoking as soon as possible to improve healing and prevent further complications associated with smoking. DENSE FOAM - 3-4 IN      Lyssa-Wound Topical Treatments:     [x] Apply moisturizing lotion to skin surrounding the wound prior to dressing change.                      _____________________________________________________________  WOUND LOCATION:   LEFT 4TH TOE WOUND    Wound Cleansing: Normal Saline      PRIMARY DRESSING:               [x]     BETADINE, LET DRY              Compression and Edema Control:     [x] Ace Wrap STARTING FROM BASE OF Toes ALL THE WAY to THE Knee to Left Leg and Right Leg          Elevate leg(s) above the level of the heart when sitting.      Avoid prolonged standing in one place.   ___________________________________________________       WOUND LOCATION:   RIGHT BUTTOCKS   Wound Cleansing: Normal Saline        PRIMARY DRESSING:               [x] ALGINATE AG                SECONDARY DRESSING:                [x] MEPILEX BORDER/ SILICONE BORDER/ OR GAUZE AND TAPE                          HOW OFTEN TO CHANGE DRESSINGS:             [] Daily [x] Three times per week:   [x] Monday- Wednesday- Friday      _____________________________________________________________________________________     Dietary:   Continue your diet as tolerated. Protein is a key nutrient in helping to repair damaged tissue and promote new tissue growth. Good sources of protein include milk, yogurt, cheese, fish, lean meat and beans. If you are DIABETIC, having diabetes can make it hard for wounds to heal. Try to keep your blood sugar within it's target range. Limit Sodium, Alcohol and Sugar.  _________________________________________________________  PRESSURE RELIEF AND OFF-LOADING:      Pressure Relief and Off Loading:  [x] Off-loading when       [] walking     [x] in bed       [x] sitting   Turn every 2 hours when in bed     Avoid putting direct pressure on the site of the wound. Limit side lying to 30 degree tilt. Limit elevating the head of the bed greater than 30 degrees. Activity: Activity as Tolerated     [] Assistive Devices     please continue to use any assistive devices advised by the provider discussed during your visit       [] Surgical shoe    [] Podus Boot(s)   [] Foam Boot(s)    [] Daryn Chandler   ________________________________________________________________  PAIN:     Please note, A small amount of pain, drainage and/or bleeding from this process might be expected, and is NORMAL. Elevate the affected limb. Use over-the-counter medications you would normally use for pain as permitted by your family doctor. For persistent pain not relieved by the above interventions, please call your family doctor. Call your doctor now or seek immediate medical care if:    You have symptoms of infection, such as: Increased pain, swelling, warmth, or redness. Red streaks leading from the area. Pus draining from the area. A fever.       ________________________________________________________________     Return Appointment:     ECF or Home Healthcare: [address] [place] [address] is responsible to order your supplies. Return Appointment: With Perla Brown CNP in  2 [facility])                    [] Orders placed during your visit:                Electronically signed by : Marquita Paige. on 1/23/2023 at 2:51 PM        _______________________________________________________________  [facility] Information: Should you experience any significant changes in your wound(s) or have questions about your wound care, please contact the [address] [place] [address] at [address] 8:30 am - 4:30 pm and Friday 8:30 am - 1:00 pm.  If you need help with your wound outside these hours and cannot wait until we are again available, contact your PCP or go to the hospital emergency room. PLEASE NOTE: IF YOU ARE UNABLE TO OBTAIN WOUND SUPPLIES, CONTINUE TO USE THE SUPPLIES YOU HAVE AVAILABLE UNTIL YOU ARE ABLE TO REACH US. KEEP THE WOUND COVERED AT ALL TIMES.            Physician Signature:_______________________     Date: ___________ Time:  ____________                     [x] Tawny Dukes CNP       [] DR Valdivia              Electronically signed by TOO Phillip CNP on 1/24/2023 at 1:07 PM

## 2023-02-06 ENCOUNTER — HOSPITAL ENCOUNTER (OUTPATIENT)
Dept: WOUND CARE | Age: 53
Discharge: HOME | End: 2023-02-06

## 2023-02-06 NOTE — DISCHARGE INSTRUCTIONS
[address] [place] [address] Physician Orders and Discharge Instructions  [address], [address]  Telephone: [phone]  [address] 8:30 am - 4:30 pm and Friday 8:30 am - 1:00 pm.          NAME:  Levi Herring  YOB: 1970  MEDICAL RECORD NUMBER:  [id number]  DATE:  2/6/2023     Important Reminders:   Please wash hands with soap and water before and after every dressing change. Do not scrub wounds. Keep wounds dry in shower unless otherwise instructed by the physician. SMOKING can slow would healing. Stop smoking as soon as possible to improve healing and prevent further complications associated with smoking. DENSE FOAM - 3-4 IN      Lyssa-Wound Topical Treatments:     [x] Apply moisturizing lotion to skin surrounding the wound prior to dressing change.                      _____________________________________________________________  WOUND LOCATION:   LEFT 4TH TOE WOUND    Wound Cleansing: Normal Saline      PRIMARY DRESSING:               [x]     BETADINE, LET DRY               Compression and Edema Control:     [x] Ace Wrap STARTING FROM BASE OF Toes ALL THE WAY to THE Knee to Left Leg and Right Leg          Elevate leg(s) above the level of the heart when sitting. Avoid prolonged standing in one place.   ___________________________________________________        WOUND LOCATION:   RIGHT BUTTOCKS   Wound Cleansing: Normal Saline        PRIMARY DRESSING:               [x] ALGINATE AG                SECONDARY DRESSING:                [x] MEPILEX BORDER/ SILICONE BORDER/ OR GAUZE AND TAPE                          HOW OFTEN TO CHANGE DRESSINGS:             [] Daily                            [x] Three times per week:   [x] Monday- Wednesday- Friday      _____________________________________________________________________________________     Dietary:   Continue your diet as tolerated.   Protein is a key nutrient in helping to repair damaged tissue and promote new tissue growth. Good sources of protein include milk, yogurt, cheese, fish, lean meat and beans. If you are DIABETIC, having diabetes can make it hard for wounds to heal. Try to keep your blood sugar within it's target range. Limit Sodium, Alcohol and Sugar.  _________________________________________________________  PRESSURE RELIEF AND OFF-LOADING:      Pressure Relief and Off Loading:  [x] Off-loading when       [] walking     [x] in bed       [x] sitting   Turn every 2 hours when in bed     Avoid putting direct pressure on the site of the wound. Limit side lying to 30 degree tilt. Limit elevating the head of the bed greater than 30 degrees. Activity: Activity as Tolerated     [] Assistive Devices     please continue to use any assistive devices advised by the provider discussed during your visit       [] Surgical shoe    [] Podus Boot(s)   [] Foam Boot(s)    [] Ottie Luz Elena   ________________________________________________________________  PAIN:     Please note, A small amount of pain, drainage and/or bleeding from this process might be expected, and is NORMAL. Elevate the affected limb. Use over-the-counter medications you would normally use for pain as permitted by your family doctor. For persistent pain not relieved by the above interventions, please call your family doctor. Call your doctor now or seek immediate medical care if:    You have symptoms of infection, such as: Increased pain, swelling, warmth, or redness. Red streaks leading from the area. Pus draining from the area. A fever. ________________________________________________________________     Return Appointment:     [facility]: [facility] is responsible to order your supplies.       Return Appointment: With Davide Monae CNP in  2 [facility])                    [] Orders placed during your visit: _______________________________________________________________  [facility] Information: Should you experience any significant changes in your wound(s) or have questions about your wound care, please contact the [address] [place] [address] at [address] 8:30 am - 4:30 pm and Friday 8:30 am - 1:00 pm.  If you need help with your wound outside these hours and cannot wait until we are again available, contact your PCP or go to the hospital emergency room. PLEASE NOTE: IF YOU ARE UNABLE TO OBTAIN WOUND SUPPLIES, CONTINUE TO USE THE SUPPLIES YOU HAVE AVAILABLE UNTIL YOU ARE ABLE TO REACH US. KEEP THE WOUND COVERED AT ALL TIMES.            Physician Signature:_______________________     Date: ___________ Time:  ____________                     [x] Marnette Primrose CNP       [] DR Portia Schultz

## 2023-02-27 ENCOUNTER — HOSPITAL ENCOUNTER (OUTPATIENT)
Dept: WOUND CARE | Age: 53
Discharge: HOME | End: 2023-02-27

## 2023-03-06 ENCOUNTER — HOSPITAL ENCOUNTER (OUTPATIENT)
Dept: WOUND CARE | Age: 53
Discharge: HOME | End: 2023-03-06

## 2023-03-17 ENCOUNTER — TELEPHONE (OUTPATIENT)
Dept: SURGERY | Age: 53
End: 2023-03-17

## 2023-03-17 NOTE — TELEPHONE ENCOUNTER
Left message on [address] machine which now answers as \"[facility]" at [phone]. I saw this patient one time on 11/28/2022. Robson Tobin saw her until 1/23/2023 and the patient canceled every appointment since then. If she needs orders for wound care, she will have to make a FU appt at the [address] [place] [address].

## 2023-03-29 ENCOUNTER — TELEPHONE (OUTPATIENT)
Dept: VASCULAR SURGERY | Age: 53
End: 2023-03-29

## 2023-03-29 NOTE — TELEPHONE ENCOUNTER
Message left for patient's homecare nurse: this patient has not been seen since January, 23 by Camilla Blackburn, and has cancelled any appointments made since that date. No orders can/will be signed until the patient is seen again at wound care. Paper work/orders were already faxed back stating this. Also, the paperwork received today says there is a verbal yes for orders, however documented in the chart is a phone call to [facility] with the same information as above stating no orders will be signed until the patient is seen. I asked for a return call should they have any questions, or they can call wound care and schedule an appointment. If she is seen, orders can be signed.

## 2023-04-03 ENCOUNTER — TELEPHONE (OUTPATIENT)
Dept: WOUND CARE | Age: 53
End: 2023-04-03

## 2023-04-03 ENCOUNTER — HOSPITAL ENCOUNTER (OUTPATIENT)
Dept: WOUND CARE | Age: 53
Discharge: HOME | End: 2023-04-03

## 2023-04-03 DIAGNOSIS — L97.522 DIABETIC ULCER OF TOE OF LEFT FOOT ASSOCIATED WITH TYPE 2 DIABETES MELLITUS, WITH FAT LAYER EXPOSED (HCC): ICD-10-CM

## 2023-04-03 DIAGNOSIS — L97.911 SKIN ULCER OF RIGHT LOWER LEG, LIMITED TO BREAKDOWN OF SKIN (HCC): ICD-10-CM

## 2023-04-03 DIAGNOSIS — I73.9 PAD (PERIPHERAL ARTERY DISEASE) (HCC): ICD-10-CM

## 2023-04-03 DIAGNOSIS — L97.921 SKIN ULCER OF LEFT LOWER LEG, LIMITED TO BREAKDOWN OF SKIN (HCC): Primary | ICD-10-CM

## 2023-04-03 DIAGNOSIS — L89.313 PRESSURE ULCER OF RIGHT BUTTOCK, STAGE 3 (HCC): ICD-10-CM

## 2023-04-03 DIAGNOSIS — E11.621 DIABETIC ULCER OF TOE OF LEFT FOOT ASSOCIATED WITH TYPE 2 DIABETES MELLITUS, WITH FAT LAYER EXPOSED (HCC): ICD-10-CM

## 2023-04-03 DIAGNOSIS — M79.89 LEG SWELLING: ICD-10-CM

## 2023-04-03 NOTE — DISCHARGE INSTRUCTIONS
***     Electronically signed by Jasmine Oneill RN on 4/3/2023 at 9:55 AM       [facility] Information: Should you experience any significant changes in your wound(s) or have questions about your wound care, please contact the [address] [place] [address] at [address] 8:00 am - 4:30 pm and Friday 8:00 am - 12:30 pm.  If you need help with your wound outside these hours and cannot wait until we are again available, contact your PCP or go to the hospital emergency room. PLEASE NOTE: IF YOU ARE UNABLE TO OBTAIN WOUND SUPPLIES, CONTINUE TO USE THE SUPPLIES YOU HAVE AVAILABLE UNTIL YOU ARE ABLE TO REACH US. IT IS MOST IMPORTANT TO KEEP THE WOUND COVERED AT ALL TIMES.      Physician Signature:_______________________    Date: ___________ Time:  ____________          {Providers Asaf Jolley

## 2023-04-03 NOTE — TELEPHONE ENCOUNTER
Patient called to cancel her appointment due to sickness. She was concerned that she will not be able to get her wound care supplies now. This patient has not been in clinic since January and home care is unable to continue seeing her until she makes her appointment. This nurse suggested she call her PCP if she is unable to make it in to her appointments routinely.

## 2023-04-06 ENCOUNTER — TELEPHONE (OUTPATIENT)
Dept: WOUND CARE | Age: 53
End: 2023-04-06

## 2023-04-17 ENCOUNTER — HOSPITAL ENCOUNTER (OUTPATIENT)
Dept: WOUND CARE | Age: 53
Discharge: HOME | End: 2023-04-17
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
## 2023-04-17 NOTE — DISCHARGE INSTRUCTIONS
[address] Physician Orders and Discharge [facility] [address]  [address] [facility], [facility] [place] [address]  Telephone: [phone]  [address] 8:00 am - 4:30 pm and Friday 8:00 am - 12:00 pm.        NAME:  Estevan Ely  YOB: 1970  MEDICAL RECORD NUMBER:  [id number]  DATE:  2023    Important Reminders:   Please wash hands with soap and water before and after every dressing change. Do not scrub wounds. Keep wounds dry in shower unless otherwise instructed by the physician. SMOKING can slow would healing. Stop smoking as soon as possible to improve healing and prevent further complications associated with smoking. Lyssa-Wound Topical Treatments:  Do not apply lotions, creams, or ointments to wound bed unless directed. [] Apply moisturizing lotion to skin surrounding the wound prior to dressing change.  [] Apply antifungal ointment to skin surrounding the wound prior to dressing change.  [] Apply thin film of no sting moisture barrier ointment to skin immediately around      wound. [] Other:       Wound Location: ***    Wound Cleansing: {Wound Cleansin}    Primary Dressing:  [] ***  []     Secondary Dressing:  [] ***  []       Dressing Frequency:  [] ***  [] Do Not Change Dressing      Wound Location: ***    Wound Cleansing: {Wound Cleansin}    Primary Dressing:  [] ***  []     Secondary Dressing:  [] ***  []       Dressing Frequency:  [] ***  [] Do Not Change Dressing      Compression and Edema Control:  [] Wear Home Compression Stockings   [] Spandagrip to: {Wound Legs:44127}   Size: []Low compression 5-10 mm/Hg      []Medium compression 10-20 mm/Hg           []High compression  20-30 mm/Hg  [] Ace Wrap Toes to Knee to {Wound Legs:10144}   [] Multilayer Compression Wrap: {Multilayer Compression Wraps:22902} to {Wound Legs:30863}   Do not get leg(s) with wrap wet.   If wraps become

## 2023-04-24 ENCOUNTER — HOSPITAL ENCOUNTER (OUTPATIENT)
Dept: WOUND CARE | Age: 53
Discharge: HOME | End: 2023-04-24
Payer: COMMERCIAL

## 2023-04-24 VITALS
SYSTOLIC BLOOD PRESSURE: 146 MMHG | DIASTOLIC BLOOD PRESSURE: 71 MMHG | TEMPERATURE: 98.1 F | HEART RATE: 81 BPM | RESPIRATION RATE: 20 BRPM

## 2023-04-24 DIAGNOSIS — R60.0 LOCALIZED EDEMA: ICD-10-CM

## 2023-04-24 DIAGNOSIS — I87.313 CHRONIC VENOUS HYPERTENSION (IDIOPATHIC) WITH ULCER OF BILATERAL LOWER EXTREMITY (HCC): Primary | ICD-10-CM

## 2023-04-24 DIAGNOSIS — E11.621 DIABETIC ULCER OF TOE OF LEFT FOOT ASSOCIATED WITH TYPE 2 DIABETES MELLITUS, WITH FAT LAYER EXPOSED (HCC): ICD-10-CM

## 2023-04-24 DIAGNOSIS — L97.911 SKIN ULCER OF RIGHT LOWER LEG, LIMITED TO BREAKDOWN OF SKIN (HCC): ICD-10-CM

## 2023-04-24 DIAGNOSIS — I73.9 PAD (PERIPHERAL ARTERY DISEASE) (HCC): ICD-10-CM

## 2023-04-24 DIAGNOSIS — L97.919 CHRONIC VENOUS HYPERTENSION (IDIOPATHIC) WITH ULCER OF BILATERAL LOWER EXTREMITY (HCC): Primary | ICD-10-CM

## 2023-04-24 DIAGNOSIS — M79.89 LEG SWELLING: ICD-10-CM

## 2023-04-24 DIAGNOSIS — L97.522 DIABETIC ULCER OF TOE OF LEFT FOOT ASSOCIATED WITH TYPE 2 DIABETES MELLITUS, WITH FAT LAYER EXPOSED (HCC): ICD-10-CM

## 2023-04-24 DIAGNOSIS — L97.929 CHRONIC VENOUS HYPERTENSION (IDIOPATHIC) WITH ULCER OF BILATERAL LOWER EXTREMITY (HCC): Primary | ICD-10-CM

## 2023-04-24 DIAGNOSIS — L97.921 SKIN ULCER OF LEFT LOWER LEG, LIMITED TO BREAKDOWN OF SKIN (HCC): ICD-10-CM

## 2023-04-24 PROCEDURE — 11042 DBRDMT SUBQ TIS 1ST 20SQCM/<: CPT | Performed by: NURSE PRACTITIONER

## 2023-04-24 PROCEDURE — 11042 DBRDMT SUBQ TIS 1ST 20SQCM/<: CPT

## 2023-04-24 PROCEDURE — 11045 DBRDMT SUBQ TISS EACH ADDL: CPT

## 2023-04-24 PROCEDURE — 11045 DBRDMT SUBQ TISS EACH ADDL: CPT | Performed by: NURSE PRACTITIONER

## 2023-04-24 PROCEDURE — 99213 OFFICE O/P EST LOW 20 MIN: CPT

## 2023-04-24 RX ORDER — BACITRACIN, NEOMYCIN, POLYMYXIN B 400; 3.5; 5 [USP'U]/G; MG/G; [USP'U]/G
OINTMENT TOPICAL ONCE
OUTPATIENT
Start: 2023-04-24 | End: 2023-04-24

## 2023-04-24 RX ORDER — LIDOCAINE HYDROCHLORIDE 40 MG/ML
SOLUTION TOPICAL ONCE
OUTPATIENT
Start: 2023-04-24 | End: 2023-04-24

## 2023-04-24 RX ORDER — CLOBETASOL PROPIONATE 0.5 MG/G
OINTMENT TOPICAL ONCE
OUTPATIENT
Start: 2023-04-24 | End: 2023-04-24

## 2023-04-24 RX ORDER — LIDOCAINE HYDROCHLORIDE 40 MG/ML
SOLUTION TOPICAL ONCE
Status: COMPLETED | OUTPATIENT
Start: 2023-04-24 | End: 2023-04-24

## 2023-04-24 RX ORDER — BETAMETHASONE DIPROPIONATE 0.05 %
OINTMENT (GRAM) TOPICAL ONCE
OUTPATIENT
Start: 2023-04-24 | End: 2023-04-24

## 2023-04-24 RX ORDER — BACITRACIN ZINC AND POLYMYXIN B SULFATE 500; 1000 [USP'U]/G; [USP'U]/G
OINTMENT TOPICAL ONCE
OUTPATIENT
Start: 2023-04-24 | End: 2023-04-24

## 2023-04-24 RX ORDER — LIDOCAINE HYDROCHLORIDE 20 MG/ML
JELLY TOPICAL ONCE
OUTPATIENT
Start: 2023-04-24 | End: 2023-04-24

## 2023-04-24 RX ORDER — LIDOCAINE 40 MG/G
CREAM TOPICAL ONCE
OUTPATIENT
Start: 2023-04-24 | End: 2023-04-24

## 2023-04-24 RX ORDER — GENTAMICIN SULFATE 1 MG/G
OINTMENT TOPICAL ONCE
OUTPATIENT
Start: 2023-04-24 | End: 2023-04-24

## 2023-04-24 RX ORDER — GINSENG 100 MG
CAPSULE ORAL ONCE
OUTPATIENT
Start: 2023-04-24 | End: 2023-04-24

## 2023-04-24 RX ORDER — LIDOCAINE 50 MG/G
OINTMENT TOPICAL ONCE
OUTPATIENT
Start: 2023-04-24 | End: 2023-04-24

## 2023-04-24 RX ADMIN — LIDOCAINE HYDROCHLORIDE: 40 SOLUTION TOPICAL at 16:00

## 2023-04-24 ASSESSMENT — PAIN SCALES - GENERAL
PAINLEVEL_OUTOF10: 0
PAINLEVEL_OUTOF10: 0

## 2023-04-24 NOTE — DISCHARGE INSTRUCTIONS
UNABLE TO OBTAIN WOUND SUPPLIES, CONTINUE TO USE THE SUPPLIES YOU HAVE AVAILABLE UNTIL YOU ARE ABLE TO REACH US. KEEP THE WOUND COVERED AT ALL TIMES.            Physician Signature:_______________________     Date: ___________ Time:  ____________                     [x] Leno Pacheco CNP       [] DR Tali Shultz

## 2023-04-24 NOTE — PLAN OF CARE
Patient Name:  Eri Cates  YOB: 1970  Today's Date:  April 24, 2023  Medical Record Number:  [id number]  Provider:    [address] [place] [address] Pkw[place]   Appointment Treatment Guidelines        The [address] [place] [address] Pkw[place] Appointment Treatment Guidelines were reviewed on April 24, 2023 with the patient. Ms. Villavicencio Erp understanding of the [address] [place] [address] Pkwy Appointment Treatment Guidelines.       Electronically signed by Samantha Ricks RN on 4/24/23 at 4:37 PM EDT

## 2023-04-24 NOTE — PROGRESS NOTES
[address]   Progress Note and Procedure Note      Sam Vick RECORD NUMBER:  [id number]  AGE: 52 y.o. GENDER: female  : 1970  EPISODE DATE:  2023    Subjective:     Chief Complaint   Patient presents with    Wound Check     Initial returning visit - bilateral lower leg wounds - states from dog scratch about 2023. States also still  Has wound to right buttock and that left th toe wound is healed. HISTORY of PRESENT ILLNESS HPI  Gely Foss is a 45 y.o. female who presents today for wound/ulcer evaluation. Pt has not been seen in [facility] since 23 due to multiple issues. History of Wound Context: Pressure No ulcer to right buttock. Venous ulcers to bilateral LE's  Wearing nasal oxygen on visit, 2 L/min. Today, wounds both legs with open wounds. Pt states original wounds caused by small dog \"scratching\" her legs, but wound have not healed due to venous disease. Pt still has home care.      Wound/Ulcer Pain Timing/Severity: none  Quality of pain: N/A  Severity:  0 / 10   Modifying Factors: None  Associated Signs/Symptoms: edema and drainage     Ulcer Identification:  Ulcer Type: venous, diabetic, and pressure     Contributing Factors: edema, venous stasis, and diabetes     Acute Wound: N/A not an acute wound     PAST MEDICAL HISTORY          Diagnosis Date    Anemia     Asthma     Cerebral artery occlusion with cerebral infarction [facility])     CHF (congestive heart failure) (HCC)     Chronic venous hypertension (idiopathic) with ulcer of bilateral lower extremity ([address] 2023    Has wounds bilateral lower legs    COPD (chronic obstructive pulmonary disease) ([address]     Diabetes ([address]     Edema     Edema     Foot ulcer ([address]     HTN (hypertension), benign 2013    Hyperlipidemia     PAD (peripheral artery disease) (HCC)     Pressure ulcer of right buttock, stage 3 ([address] 2022    Thyroid disease        PAST SURGICAL

## 2023-05-01 ENCOUNTER — TELEPHONE (OUTPATIENT)
Dept: WOUND CARE | Age: 53
End: 2023-05-01

## 2023-05-01 NOTE — TELEPHONE ENCOUNTER
Writer spoke with patient concerning 9 missed appts and no show today. Writer spoke with patient on 4/24/23 concerning missed appointments and possible discharge from [facility] if pattern continues. Writer called and spoke with patient concerning decision per Leola Meredith CNP to discharge from [facility] r/t missed appts. Writer informed patient [facility] would be available for 30 days for wound care but she would need to find another wound care center. Patient did not want to schedule an appt. Writer did leave a message with  [facility] (PCP) that patient had been discharged from [facility] effective 5/31/23 for cancelled appts, no show and non-compliance with treatment regimen.

## 2023-06-14 ENCOUNTER — APPOINTMENT (OUTPATIENT)
Dept: GENERAL RADIOLOGY | Age: 53
DRG: 133 | End: 2023-06-14
Payer: COMMERCIAL

## 2023-06-14 ENCOUNTER — HOSPITAL ENCOUNTER (INPATIENT)
Age: 53
LOS: 2 days | Discharge: HOME | DRG: 133 | End: 2023-06-16
Attending: EMERGENCY MEDICINE | Admitting: INTERNAL MEDICINE
Payer: COMMERCIAL

## 2023-06-14 DIAGNOSIS — E87.5 HYPERKALEMIA: ICD-10-CM

## 2023-06-14 DIAGNOSIS — E11.65 TYPE 2 DIABETES MELLITUS WITH HYPERGLYCEMIA, WITH LONG-TERM CURRENT USE OF INSULIN (HCC): ICD-10-CM

## 2023-06-14 DIAGNOSIS — J96.01 ACUTE RESPIRATORY FAILURE WITH HYPOXIA AND HYPERCARBIA (HCC): Primary | ICD-10-CM

## 2023-06-14 DIAGNOSIS — Z79.4 TYPE 2 DIABETES MELLITUS WITH HYPERGLYCEMIA, WITH LONG-TERM CURRENT USE OF INSULIN (HCC): ICD-10-CM

## 2023-06-14 DIAGNOSIS — J96.02 ACUTE RESPIRATORY FAILURE WITH HYPOXIA AND HYPERCARBIA (HCC): Primary | ICD-10-CM

## 2023-06-14 DIAGNOSIS — R77.8 ELEVATED TROPONIN: ICD-10-CM

## 2023-06-14 DIAGNOSIS — F11.20 CHRONIC NARCOTIC DEPENDENCE (HCC): ICD-10-CM

## 2023-06-14 DIAGNOSIS — I50.9 ACUTE ON CHRONIC CONGESTIVE HEART FAILURE, UNSPECIFIED HEART FAILURE TYPE (HCC): ICD-10-CM

## 2023-06-14 PROBLEM — G93.41 METABOLIC ENCEPHALOPATHY: Status: ACTIVE | Noted: 2023-06-14

## 2023-06-14 LAB
ALBUMIN SERPL-MCNC: 3.8 G/DL (ref 3.4–5)
ALBUMIN/GLOB SERPL: 1.1 {RATIO} (ref 1.1–2.2)
ALP SERPL-CCNC: 159 U/L (ref 40–129)
ALT SERPL-CCNC: 34 U/L (ref 10–40)
ANION GAP SERPL CALCULATED.3IONS-SCNC: 10 MMOL/L (ref 3–16)
ANION GAP SERPL CALCULATED.3IONS-SCNC: 12 MMOL/L (ref 3–16)
ANION GAP SERPL CALCULATED.3IONS-SCNC: 8 MMOL/L (ref 3–16)
AST SERPL-CCNC: 28 U/L (ref 15–37)
BASE EXCESS BLDA CALC-SCNC: 10.8 MMOL/L (ref -3–3)
BASE EXCESS BLDV CALC-SCNC: 7.4 MMOL/L
BASOPHILS # BLD: 0 K/UL (ref 0–0.2)
BASOPHILS NFR BLD: 0.5 %
BILIRUB SERPL-MCNC: 1.3 MG/DL (ref 0–1)
BUN SERPL-MCNC: 54 MG/DL (ref 7–20)
BUN SERPL-MCNC: 55 MG/DL (ref 7–20)
BUN SERPL-MCNC: 57 MG/DL (ref 7–20)
CALCIUM SERPL-MCNC: 9 MG/DL (ref 8.3–10.6)
CALCIUM SERPL-MCNC: 9.2 MG/DL (ref 8.3–10.6)
CALCIUM SERPL-MCNC: 9.4 MG/DL (ref 8.3–10.6)
CHLORIDE SERPL-SCNC: 95 MMOL/L (ref 99–110)
CHLORIDE UR-SCNC: 85 MMOL/L
CO2 BLDA-SCNC: 47.6 MMOL/L
CO2 BLDV-SCNC: 45 MMOL/L
CO2 SERPL-SCNC: 32 MMOL/L (ref 21–32)
CO2 SERPL-SCNC: 33 MMOL/L (ref 21–32)
CO2 SERPL-SCNC: 34 MMOL/L (ref 21–32)
COHGB MFR BLDA: 3.8 % (ref 0–1.5)
COHGB MFR BLDV: 3.7 %
COHGB MFR BLDV: 4 %
CREAT SERPL-MCNC: 0.7 MG/DL (ref 0.6–1.1)
CREAT SERPL-MCNC: 0.7 MG/DL (ref 0.6–1.1)
CREAT SERPL-MCNC: 0.8 MG/DL (ref 0.6–1.1)
CREAT UR-MCNC: 34.5 MG/DL (ref 28–259)
DEPRECATED RDW RBC AUTO: 23 % (ref 12.4–15.4)
EKG ATRIAL RATE: 89 BPM
EKG DIAGNOSIS: NORMAL
EKG P AXIS: 56 DEGREES
EKG P-R INTERVAL: 172 MS
EKG Q-T INTERVAL: 386 MS
EKG QRS DURATION: 106 MS
EKG QTC CALCULATION (BAZETT): 469 MS
EKG R AXIS: 185 DEGREES
EKG T AXIS: 35 DEGREES
EKG VENTRICULAR RATE: 89 BPM
EOSINOPHIL # BLD: 0.1 K/UL (ref 0–0.6)
EOSINOPHIL NFR BLD: 2.7 %
EST. AVERAGE GLUCOSE BLD GHB EST-MCNC: 148.5 MG/DL
GFR SERPLBLD CREATININE-BSD FMLA CKD-EPI: >60 ML/MIN/{1.73_M2}
GLUCOSE BLD-MCNC: 208 MG/DL (ref 70–99)
GLUCOSE BLD-MCNC: 226 MG/DL (ref 70–99)
GLUCOSE BLD-MCNC: 228 MG/DL (ref 70–99)
GLUCOSE BLD-MCNC: 239 MG/DL (ref 70–99)
GLUCOSE BLD-MCNC: 245 MG/DL (ref 70–99)
GLUCOSE BLD-MCNC: 248 MG/DL (ref 70–99)
GLUCOSE BLD-MCNC: 293 MG/DL (ref 70–99)
GLUCOSE BLD-MCNC: >600 MG/DL (ref 70–99)
GLUCOSE SERPL-MCNC: 239 MG/DL (ref 70–99)
GLUCOSE SERPL-MCNC: 288 MG/DL (ref 70–99)
GLUCOSE SERPL-MCNC: 303 MG/DL (ref 70–99)
HBA1C MFR BLD: 6.8 %
HCO3 BLDA-SCNC: 44.1 MMOL/L (ref 21–29)
HCO3 BLDV-SCNC: 41 MMOL/L (ref 23–29)
HCT VFR BLD AUTO: 51.9 % (ref 36–48)
HGB BLD-MCNC: 13.7 G/DL (ref 12–16)
HGB BLDA-MCNC: 14.7 G/DL (ref 12–16)
LACTATE BLDV-SCNC: 0.8 MMOL/L (ref 0.4–1.9)
LACTATE BLDV-SCNC: 0.8 MMOL/L (ref 0.4–1.9)
LYMPHOCYTES # BLD: 0.5 K/UL (ref 1–5.1)
LYMPHOCYTES NFR BLD: 15.2 %
MAGNESIUM SERPL-MCNC: 2.2 MG/DL (ref 1.8–2.4)
MCH RBC QN AUTO: 22.4 PG (ref 26–34)
MCHC RBC AUTO-ENTMCNC: 26.3 G/DL (ref 31–36)
MCV RBC AUTO: 85 FL (ref 80–100)
METHGB MFR BLDA: 0.3 %
METHGB MFR BLDV: 0.5 %
METHGB MFR BLDV: 0.7 %
MONOCYTES # BLD: 0.3 K/UL (ref 0–1.3)
MONOCYTES NFR BLD: 8.5 %
NEUTROPHILS # BLD: 2.3 K/UL (ref 1.7–7.7)
NEUTROPHILS NFR BLD: 73.1 %
NT-PROBNP SERPL-MCNC: 4726 PG/ML (ref 0–124)
O2 THERAPY: ABNORMAL
OSMOLALITY UR: 439 MOSM/KG (ref 390–1070)
PCO2 BLDA: 112 MMHG (ref 35–45)
PCO2 BLDV: 118 MMHG (ref 40–50)
PCO2 BLDV: >120 MMHG (ref 40–50)
PERFORMED ON: ABNORMAL
PH BLDA: 7.2 [PH] (ref 7.35–7.45)
PH BLDV: 7.14 [PH] (ref 7.35–7.45)
PH BLDV: 7.15 [PH] (ref 7.35–7.45)
PLATELET # BLD AUTO: 87 K/UL (ref 135–450)
PMV BLD AUTO: 8.6 FL (ref 5–10.5)
PO2 BLDA: 99.5 MMHG (ref 75–108)
PO2 BLDV: 79 MMHG
PO2 BLDV: <30 MMHG
POC SAMPLE TYPE: ABNORMAL
POTASSIUM BLD-SCNC: 7.3 MMOL/L (ref 3.5–5.1)
POTASSIUM SERPL-SCNC: 6.1 MMOL/L (ref 3.5–5.1)
POTASSIUM SERPL-SCNC: 6.2 MMOL/L (ref 3.5–5.1)
POTASSIUM SERPL-SCNC: 6.4 MMOL/L (ref 3.5–5.1)
POTASSIUM SERPL-SCNC: 6.5 MMOL/L (ref 3.5–5.1)
POTASSIUM UR-SCNC: 39.6 MMOL/L
PROCALCITONIN SERPL IA-MCNC: 0.09 NG/ML (ref 0–0.15)
PROT SERPL-MCNC: 7.2 G/DL (ref 6.4–8.2)
RBC # BLD AUTO: 6.11 M/UL (ref 4–5.2)
REASON FOR REJECTION: NORMAL
REJECTED TEST: NORMAL
SAO2 % BLDA: 98.1 %
SAO2 % BLDV: 39 %
SAO2 % BLDV: 94 %
SODIUM SERPL-SCNC: 137 MMOL/L (ref 136–145)
SODIUM SERPL-SCNC: 138 MMOL/L (ref 136–145)
SODIUM SERPL-SCNC: 139 MMOL/L (ref 136–145)
SODIUM UR-SCNC: 67 MMOL/L
TROPONIN, HIGH SENSITIVITY: 18 NG/L (ref 0–14)
TROPONIN, HIGH SENSITIVITY: 18 NG/L (ref 0–14)
WBC # BLD AUTO: 3.1 K/UL (ref 4–11)

## 2023-06-14 PROCEDURE — 83605 ASSAY OF LACTIC ACID: CPT

## 2023-06-14 PROCEDURE — 83036 HEMOGLOBIN GLYCOSYLATED A1C: CPT

## 2023-06-14 PROCEDURE — 96374 THER/PROPH/DIAG INJ IV PUSH: CPT

## 2023-06-14 PROCEDURE — 2580000003 HC RX 258: Performed by: EMERGENCY MEDICINE

## 2023-06-14 PROCEDURE — 85025 COMPLETE CBC W/AUTO DIFF WBC: CPT

## 2023-06-14 PROCEDURE — 82436 ASSAY OF URINE CHLORIDE: CPT

## 2023-06-14 PROCEDURE — 99285 EMERGENCY DEPT VISIT HI MDM: CPT

## 2023-06-14 PROCEDURE — 2580000003 HC RX 258

## 2023-06-14 PROCEDURE — 84132 ASSAY OF SERUM POTASSIUM: CPT

## 2023-06-14 PROCEDURE — 83735 ASSAY OF MAGNESIUM: CPT

## 2023-06-14 PROCEDURE — 96375 TX/PRO/DX INJ NEW DRUG ADDON: CPT

## 2023-06-14 PROCEDURE — 80053 COMPREHEN METABOLIC PANEL: CPT

## 2023-06-14 PROCEDURE — 6370000000 HC RX 637 (ALT 250 FOR IP): Performed by: EMERGENCY MEDICINE

## 2023-06-14 PROCEDURE — 2500000003 HC RX 250 WO HCPCS: Performed by: EMERGENCY MEDICINE

## 2023-06-14 PROCEDURE — 6370000000 HC RX 637 (ALT 250 FOR IP): Performed by: INTERNAL MEDICINE

## 2023-06-14 PROCEDURE — 87040 BLOOD CULTURE FOR BACTERIA: CPT

## 2023-06-14 PROCEDURE — 99291 CRITICAL CARE FIRST HOUR: CPT | Performed by: INTERNAL MEDICINE

## 2023-06-14 PROCEDURE — 6360000002 HC RX W HCPCS: Performed by: INTERNAL MEDICINE

## 2023-06-14 PROCEDURE — 2000000000 HC ICU R&B

## 2023-06-14 PROCEDURE — 93005 ELECTROCARDIOGRAM TRACING: CPT | Performed by: EMERGENCY MEDICINE

## 2023-06-14 PROCEDURE — 82803 BLOOD GASES ANY COMBINATION: CPT

## 2023-06-14 PROCEDURE — 84300 ASSAY OF URINE SODIUM: CPT

## 2023-06-14 PROCEDURE — 71045 X-RAY EXAM CHEST 1 VIEW: CPT

## 2023-06-14 PROCEDURE — 94640 AIRWAY INHALATION TREATMENT: CPT

## 2023-06-14 PROCEDURE — 93010 ELECTROCARDIOGRAM REPORT: CPT | Performed by: INTERNAL MEDICINE

## 2023-06-14 PROCEDURE — 83880 ASSAY OF NATRIURETIC PEPTIDE: CPT

## 2023-06-14 PROCEDURE — 84484 ASSAY OF TROPONIN QUANT: CPT

## 2023-06-14 PROCEDURE — 84133 ASSAY OF URINE POTASSIUM: CPT

## 2023-06-14 PROCEDURE — 6360000002 HC RX W HCPCS: Performed by: EMERGENCY MEDICINE

## 2023-06-14 PROCEDURE — 6370000000 HC RX 637 (ALT 250 FOR IP): Performed by: NURSE PRACTITIONER

## 2023-06-14 PROCEDURE — 99292 CRITICAL CARE ADDL 30 MIN: CPT | Performed by: INTERNAL MEDICINE

## 2023-06-14 PROCEDURE — 84145 PROCALCITONIN (PCT): CPT

## 2023-06-14 PROCEDURE — 83935 ASSAY OF URINE OSMOLALITY: CPT

## 2023-06-14 PROCEDURE — 82570 ASSAY OF URINE CREATININE: CPT

## 2023-06-14 PROCEDURE — 2500000003 HC RX 250 WO HCPCS: Performed by: NURSE PRACTITIONER

## 2023-06-14 PROCEDURE — 2500000003 HC RX 250 WO HCPCS: Performed by: INTERNAL MEDICINE

## 2023-06-14 PROCEDURE — 2580000003 HC RX 258: Performed by: NURSE PRACTITIONER

## 2023-06-14 PROCEDURE — 94660 CPAP INITIATION&MGMT: CPT

## 2023-06-14 PROCEDURE — 36600 WITHDRAWAL OF ARTERIAL BLOOD: CPT

## 2023-06-14 PROCEDURE — 2700000000 HC OXYGEN THERAPY PER DAY

## 2023-06-14 PROCEDURE — 2580000003 HC RX 258: Performed by: INTERNAL MEDICINE

## 2023-06-14 PROCEDURE — 36415 COLL VENOUS BLD VENIPUNCTURE: CPT

## 2023-06-14 RX ORDER — WATER 1000 ML/1000ML
INJECTION, SOLUTION INTRAVENOUS
Status: COMPLETED
Start: 2023-06-14 | End: 2023-06-14

## 2023-06-14 RX ORDER — INSULIN LISPRO 100 [IU]/ML
0-4 INJECTION, SOLUTION INTRAVENOUS; SUBCUTANEOUS NIGHTLY
Status: DISCONTINUED | OUTPATIENT
Start: 2023-06-14 | End: 2023-06-16 | Stop reason: HOSPADM

## 2023-06-14 RX ORDER — CHLORHEXIDINE GLUCONATE 0.12 MG/ML
15 RINSE ORAL 2 TIMES DAILY
Status: DISCONTINUED | OUTPATIENT
Start: 2023-06-14 | End: 2023-06-15

## 2023-06-14 RX ORDER — FUROSEMIDE 10 MG/ML
40 INJECTION INTRAMUSCULAR; INTRAVENOUS ONCE
Status: COMPLETED | OUTPATIENT
Start: 2023-06-14 | End: 2023-06-14

## 2023-06-14 RX ORDER — INSULIN LISPRO 100 [IU]/ML
0-4 INJECTION, SOLUTION INTRAVENOUS; SUBCUTANEOUS
Status: DISCONTINUED | OUTPATIENT
Start: 2023-06-14 | End: 2023-06-16 | Stop reason: HOSPADM

## 2023-06-14 RX ORDER — SODIUM CHLORIDE 0.9 % (FLUSH) 0.9 %
5-40 SYRINGE (ML) INJECTION EVERY 12 HOURS SCHEDULED
Status: DISCONTINUED | OUTPATIENT
Start: 2023-06-14 | End: 2023-06-16 | Stop reason: HOSPADM

## 2023-06-14 RX ORDER — CALCIUM GLUCONATE 20 MG/ML
1000 INJECTION, SOLUTION INTRAVENOUS ONCE
Status: COMPLETED | OUTPATIENT
Start: 2023-06-14 | End: 2023-06-14

## 2023-06-14 RX ORDER — CALCIUM GLUCONATE 94 MG/ML
1000 INJECTION, SOLUTION INTRAVENOUS ONCE
Status: COMPLETED | OUTPATIENT
Start: 2023-06-14 | End: 2023-06-14

## 2023-06-14 RX ORDER — CARVEDILOL 12.5 MG/1
12.5 TABLET ORAL 2 TIMES DAILY WITH MEALS
Status: DISCONTINUED | OUTPATIENT
Start: 2023-06-14 | End: 2023-06-16 | Stop reason: HOSPADM

## 2023-06-14 RX ORDER — SODIUM POLYSTYRENE SULFONATE 15 G/60ML
40 SUSPENSION ORAL; RECTAL ONCE
Status: COMPLETED | OUTPATIENT
Start: 2023-06-14 | End: 2023-06-14

## 2023-06-14 RX ORDER — POLYETHYLENE GLYCOL 3350 17 G/17G
17 POWDER, FOR SOLUTION ORAL DAILY PRN
Status: DISCONTINUED | OUTPATIENT
Start: 2023-06-14 | End: 2023-06-16 | Stop reason: HOSPADM

## 2023-06-14 RX ORDER — SODIUM CHLORIDE 9 MG/ML
INJECTION, SOLUTION INTRAVENOUS PRN
Status: DISCONTINUED | OUTPATIENT
Start: 2023-06-14 | End: 2023-06-16 | Stop reason: HOSPADM

## 2023-06-14 RX ORDER — ACETAMINOPHEN 325 MG/1
650 TABLET ORAL EVERY 6 HOURS PRN
Status: DISCONTINUED | OUTPATIENT
Start: 2023-06-14 | End: 2023-06-16 | Stop reason: HOSPADM

## 2023-06-14 RX ORDER — LEVOTHYROXINE SODIUM 0.03 MG/1
25 TABLET ORAL
Status: DISCONTINUED | OUTPATIENT
Start: 2023-06-15 | End: 2023-06-16 | Stop reason: HOSPADM

## 2023-06-14 RX ORDER — ATORVASTATIN CALCIUM 40 MG/1
40 TABLET, FILM COATED ORAL NIGHTLY
Status: DISCONTINUED | OUTPATIENT
Start: 2023-06-14 | End: 2023-06-16 | Stop reason: HOSPADM

## 2023-06-14 RX ORDER — ALBUTEROL SULFATE 90 UG/1
1 AEROSOL, METERED RESPIRATORY (INHALATION) EVERY 4 HOURS PRN
Status: DISCONTINUED | OUTPATIENT
Start: 2023-06-14 | End: 2023-06-16 | Stop reason: HOSPADM

## 2023-06-14 RX ORDER — FUROSEMIDE 10 MG/ML
20 INJECTION INTRAMUSCULAR; INTRAVENOUS 2 TIMES DAILY
Status: DISCONTINUED | OUTPATIENT
Start: 2023-06-14 | End: 2023-06-16

## 2023-06-14 RX ORDER — ONDANSETRON 2 MG/ML
4 INJECTION INTRAMUSCULAR; INTRAVENOUS EVERY 6 HOURS PRN
Status: DISCONTINUED | OUTPATIENT
Start: 2023-06-14 | End: 2023-06-16 | Stop reason: HOSPADM

## 2023-06-14 RX ORDER — DEXTROSE MONOHYDRATE 100 MG/ML
INJECTION, SOLUTION INTRAVENOUS CONTINUOUS PRN
Status: DISCONTINUED | OUTPATIENT
Start: 2023-06-14 | End: 2023-06-14 | Stop reason: SDUPTHER

## 2023-06-14 RX ORDER — ENOXAPARIN SODIUM 100 MG/ML
40 INJECTION SUBCUTANEOUS NIGHTLY
Status: DISCONTINUED | OUTPATIENT
Start: 2023-06-14 | End: 2023-06-16 | Stop reason: HOSPADM

## 2023-06-14 RX ORDER — IPRATROPIUM BROMIDE AND ALBUTEROL SULFATE 2.5; .5 MG/3ML; MG/3ML
1 SOLUTION RESPIRATORY (INHALATION) ONCE
Status: COMPLETED | OUTPATIENT
Start: 2023-06-14 | End: 2023-06-14

## 2023-06-14 RX ORDER — DEXTROSE MONOHYDRATE 100 MG/ML
INJECTION, SOLUTION INTRAVENOUS CONTINUOUS PRN
Status: DISCONTINUED | OUTPATIENT
Start: 2023-06-14 | End: 2023-06-16 | Stop reason: HOSPADM

## 2023-06-14 RX ORDER — INSULIN GLARGINE 100 [IU]/ML
8 INJECTION, SOLUTION SUBCUTANEOUS NIGHTLY
Status: DISCONTINUED | OUTPATIENT
Start: 2023-06-14 | End: 2023-06-16 | Stop reason: HOSPADM

## 2023-06-14 RX ORDER — ONDANSETRON 4 MG/1
4 TABLET, ORALLY DISINTEGRATING ORAL EVERY 8 HOURS PRN
Status: DISCONTINUED | OUTPATIENT
Start: 2023-06-14 | End: 2023-06-16 | Stop reason: HOSPADM

## 2023-06-14 RX ORDER — IPRATROPIUM BROMIDE AND ALBUTEROL SULFATE 2.5; .5 MG/3ML; MG/3ML
1 SOLUTION RESPIRATORY (INHALATION) EVERY 4 HOURS
Status: DISCONTINUED | OUTPATIENT
Start: 2023-06-14 | End: 2023-06-16

## 2023-06-14 RX ORDER — AZITHROMYCIN 250 MG/1
250 TABLET, FILM COATED ORAL DAILY
Status: DISCONTINUED | OUTPATIENT
Start: 2023-06-14 | End: 2023-06-16 | Stop reason: HOSPADM

## 2023-06-14 RX ORDER — ACETAMINOPHEN 650 MG/1
650 SUPPOSITORY RECTAL EVERY 6 HOURS PRN
Status: DISCONTINUED | OUTPATIENT
Start: 2023-06-14 | End: 2023-06-16 | Stop reason: HOSPADM

## 2023-06-14 RX ORDER — ALBUTEROL SULFATE 2.5 MG/3ML
10 SOLUTION RESPIRATORY (INHALATION) ONCE
Status: COMPLETED | OUTPATIENT
Start: 2023-06-14 | End: 2023-06-14

## 2023-06-14 RX ORDER — SODIUM POLYSTYRENE SULFONATE 15 G/60ML
15 SUSPENSION ORAL; RECTAL ONCE
Status: COMPLETED | OUTPATIENT
Start: 2023-06-14 | End: 2023-06-15

## 2023-06-14 RX ORDER — ONDANSETRON 2 MG/ML
4 INJECTION INTRAMUSCULAR; INTRAVENOUS ONCE
Status: COMPLETED | OUTPATIENT
Start: 2023-06-14 | End: 2023-06-14

## 2023-06-14 RX ORDER — FENTANYL CITRATE-0.9 % NACL/PF 10 MCG/ML
25-300 PLASTIC BAG, INJECTION (ML) INTRAVENOUS CONTINUOUS
Status: DISCONTINUED | OUTPATIENT
Start: 2023-06-14 | End: 2023-06-15

## 2023-06-14 RX ORDER — PROPOFOL 10 MG/ML
5-80 INJECTION, EMULSION INTRAVENOUS CONTINUOUS
Status: DISCONTINUED | OUTPATIENT
Start: 2023-06-14 | End: 2023-06-15

## 2023-06-14 RX ORDER — NALOXONE HYDROCHLORIDE 1 MG/ML
2 INJECTION INTRAMUSCULAR; INTRAVENOUS; SUBCUTANEOUS ONCE
Status: COMPLETED | OUTPATIENT
Start: 2023-06-14 | End: 2023-06-14

## 2023-06-14 RX ORDER — SODIUM CHLORIDE 0.9 % (FLUSH) 0.9 %
5-40 SYRINGE (ML) INJECTION PRN
Status: DISCONTINUED | OUTPATIENT
Start: 2023-06-14 | End: 2023-06-16 | Stop reason: HOSPADM

## 2023-06-14 RX ADMIN — AZITHROMYCIN 250 MG: 250 TABLET, FILM COATED ORAL at 19:33

## 2023-06-14 RX ADMIN — CALCIUM GLUCONATE 1000 MG: 98 INJECTION, SOLUTION INTRAVENOUS at 14:25

## 2023-06-14 RX ADMIN — DEXTROSE MONOHYDRATE 250 ML: 100 INJECTION, SOLUTION INTRAVENOUS at 23:01

## 2023-06-14 RX ADMIN — Medication 10 ML: at 21:34

## 2023-06-14 RX ADMIN — INSULIN LISPRO 1 UNITS: 100 INJECTION, SOLUTION INTRAVENOUS; SUBCUTANEOUS at 20:07

## 2023-06-14 RX ADMIN — METHYLPREDNISOLONE SODIUM SUCCINATE 40 MG: 40 INJECTION, POWDER, FOR SOLUTION INTRAMUSCULAR; INTRAVENOUS at 16:52

## 2023-06-14 RX ADMIN — INSULIN GLARGINE 8 UNITS: 100 INJECTION, SOLUTION SUBCUTANEOUS at 16:57

## 2023-06-14 RX ADMIN — ALBUTEROL SULFATE 10 MG: 2.5 SOLUTION RESPIRATORY (INHALATION) at 22:30

## 2023-06-14 RX ADMIN — WATER 1 ML: 1 INJECTION INTRAMUSCULAR; INTRAVENOUS; SUBCUTANEOUS at 23:06

## 2023-06-14 RX ADMIN — IPRATROPIUM BROMIDE AND ALBUTEROL SULFATE 1 DOSE: .5; 3 SOLUTION RESPIRATORY (INHALATION) at 10:54

## 2023-06-14 RX ADMIN — DEXTROSE MONOHYDRATE 250 ML: 100 INJECTION, SOLUTION INTRAVENOUS at 14:19

## 2023-06-14 RX ADMIN — IPRATROPIUM BROMIDE AND ALBUTEROL SULFATE 1 DOSE: 2.5; .5 SOLUTION RESPIRATORY (INHALATION) at 20:39

## 2023-06-14 RX ADMIN — ONDANSETRON 4 MG: 2 INJECTION INTRAMUSCULAR; INTRAVENOUS at 10:52

## 2023-06-14 RX ADMIN — INSULIN HUMAN 10 UNITS: 100 INJECTION, SOLUTION PARENTERAL at 14:22

## 2023-06-14 RX ADMIN — CALCIUM GLUCONATE 1000 MG: 20 INJECTION, SOLUTION INTRAVENOUS at 21:30

## 2023-06-14 RX ADMIN — FUROSEMIDE 40 MG: 10 INJECTION, SOLUTION INTRAMUSCULAR; INTRAVENOUS at 13:34

## 2023-06-14 RX ADMIN — CARVEDILOL 12.5 MG: 12.5 TABLET, FILM COATED ORAL at 19:33

## 2023-06-14 RX ADMIN — ONDANSETRON 4 MG: 2 INJECTION INTRAMUSCULAR; INTRAVENOUS at 21:38

## 2023-06-14 RX ADMIN — CHLORHEXIDINE GLUCONATE 15 ML: 1.2 RINSE ORAL at 21:34

## 2023-06-14 RX ADMIN — FUROSEMIDE 20 MG: 10 INJECTION, SOLUTION INTRAMUSCULAR; INTRAVENOUS at 19:42

## 2023-06-14 RX ADMIN — NALOXONE HYDROCHLORIDE 2 MG: 1 INJECTION PARENTERAL at 10:53

## 2023-06-14 RX ADMIN — SODIUM POLYSTYRENE SULFONATE 40 G: 15 SUSPENSION ORAL; RECTAL at 21:06

## 2023-06-14 RX ADMIN — INSULIN HUMAN 7 UNITS: 100 INJECTION, SOLUTION PARENTERAL at 23:00

## 2023-06-14 RX ADMIN — METHYLPREDNISOLONE SODIUM SUCCINATE 40 MG: 40 INJECTION, POWDER, FOR SOLUTION INTRAMUSCULAR; INTRAVENOUS at 23:12

## 2023-06-14 RX ADMIN — SODIUM CHLORIDE, PRESERVATIVE FREE 20 MG: 5 INJECTION INTRAVENOUS at 21:34

## 2023-06-14 RX ADMIN — ENOXAPARIN SODIUM 40 MG: 100 INJECTION SUBCUTANEOUS at 21:33

## 2023-06-14 RX ADMIN — NITROGLYCERIN 1 INCH: 20 OINTMENT TOPICAL at 11:33

## 2023-06-14 RX ADMIN — Medication 50 MEQ: at 14:32

## 2023-06-14 ASSESSMENT — PAIN DESCRIPTION - LOCATION: LOCATION: LEG

## 2023-06-14 ASSESSMENT — PAIN SCALES - GENERAL
PAINLEVEL_OUTOF10: 0
PAINLEVEL_OUTOF10: 10

## 2023-06-14 ASSESSMENT — ENCOUNTER SYMPTOMS: SHORTNESS OF BREATH: 1

## 2023-06-14 ASSESSMENT — PAIN DESCRIPTION - DESCRIPTORS: DESCRIPTORS: PATIENT UNABLE TO DESCRIBE

## 2023-06-14 ASSESSMENT — PAIN DESCRIPTION - ORIENTATION: ORIENTATION: RIGHT;LEFT

## 2023-06-14 NOTE — PROGRESS NOTES
Agree with nursing documentation. I recommended intubation multiple times. Kevin Morales Despite this, the family did not want the mother to be intubated at this time. However, the did not want to change her CODE STATUS. Therefore, follow blood gas 2 hours. AVAPS increased to tidal volume of 600. Minute ventilation currently 8.8. I spent 75 minutes of critical care time with this patient excluding any procedures.

## 2023-06-14 NOTE — ED NOTES
Pt's son at bedside, he states that his cousin took his mother to the methadone clinic this morning and that she became altered and SOB after she received her dose of methadone. Pt's son also states that the pt has COPD and CHF and has had to be hospitalized multiple times for hypercapnia at [facility]. Pt's son requests that the pt be transferred to [facility] and states that he did request this of the EMS and they refused. Dr. John Montanez made aware.       Bry Díaz RN  06/14/23 9930

## 2023-06-14 NOTE — PROGRESS NOTES
Dr. Peter Meza in room discussing with patients children patient condition. Children do not want to intubate patient unless absolutely necessary. Patient states she wants everything done to save her life but also denies wanting to be put to sleep. Decision to not intubate at this time, keep patient on BiPAP and check a ABG in 2 hours.

## 2023-06-14 NOTE — PROGRESS NOTES
Plan was initially to intubate patient so RN luh up 30 of etomidate and 50 of kev per verbal order from Dr. Bryan Tipton. After a long family discussion, decision to not intubate patient. Medications wasted. Narcotic Waste Documentation    Administered 0 mg of Etomidate* and wasted 30 mg per [facility]. Narcotic Waste Documentation    Administered 0 mg of Rocuronium* and wasted 50 mg per [facility].       Electronically signed by Yesi Harvey RN on 6/14/2023 at 8:01 PM    Electronically signed by Brodie Berg RN on 6/14/2023 at 8:01 PM

## 2023-06-14 NOTE — PROGRESS NOTES
Medication Reconciliation    List of medications patient is currently taking is complete. Source of information: 1.  discharge med list                                      2. Conversation with patient's daughter at bedside                                      2. EPIC records      Allergies  Doxycycline, Ketorolac tromethamine, Morphine and related, Codeine, Pcn [penicillins], and Ciprofloxacin     Notes regarding home medications:   1. Daughter states that discharge med list is up-to-date list. She isn't able to list out medications. Patient isn't able to confirm whether or not she took medications this morning  2. According to  discharge note, torsemide was discontinued because \"given today bicarb on renal panel is increased and patient does not seem fluid overload. \" Patient was supposed to see PCP to assess whether or not to reassess, doesn't appear that this happened. 3. Confirmed methadone dose with [facility] ([phone]). Dose is 15mg daily and they confirm patient did have dose this morning.       HEATHER Betancur [facility]   6/14/2023  4:39 PM

## 2023-06-14 NOTE — ED NOTES
Pt arrived to dept via EMS from her methadone clinic. Per EMS report pt was brought in this was to the clinic by a family member and did not receive her daily dose of methadone. Pt brought in d/t AMS and SOB. Pt is lethargic, slumped over but responsive to voice and pain. Pt will follow directions and is moving all four extremities freely and states that she was given her normal dose of methadone at the clinic. Pt awake and alert and arrived on a non-rebreather with SpO2 in 40's that quickly came up into the 90's after a BIPAP was placed on pt. Weeping wounds noted on both legs. Pt was given 2 mg of narcan IV upon arrival with no noticeable effect on pt somnolence. Pt placed in gown and on cardiac monitor. Call light in reach. Will continue to monitor.        Bry Díaz RN  06/14/23 5208

## 2023-06-14 NOTE — H&P
V2.0  History and Physical      Name:  Anya Khan /Age/Sex: 1970  (52 y.o. female)   MRN & CSN:  [id number] & [id number] Encounter Date/Time: 2023 2:16 PM EDT   Location:  [address]98 PCP: JOHNATHON [facility] Day: 1    Assessment and Plan:   Anya Khan is a 52 y.o. female admitted for acute hypoxic hypercapnic respite failure      Plan:  Acute hypoxic hypercapnic respite failure. On BiPAP, slowly improving. Remains high disorientation. Will admit to ICU, critical care consulted, repeat VBG in 4 hours of starting BiPAP  Acute metabolic encephalopathy due to hypercarbia and methadone. Nonfocal  Acute COPD exacerbation. Chest x-ray nonacute. Steroids antibiotics nebs  Hyperkalemia 6.1. Status post hyperkalemia protocol in ER, repeat BMP  Mild exacerbation of heart failure. Elevated proBNP with imaging concerning for volume overload. Will do gentle diuresis  Elevated nontrending troponins. No chest pain reported. Will trend for now  Hold torsemide, switch to IV, hold methadone and losartan    DVT prophylaxis Lovenox  Full code    Disposition:     Home versus ECF in 3 to 4 days      History from:     patient    History of Present Illness:     Chief Complaint: Lethargy and shortness of breath    Anya Khan is a 52 y.o. female with pmh of congestive heart failure, hypothyroid, hyperlipidemia, on methadone for chronic pain was brought from pain clinic due to lethargy and shortness of breat. Apparently patient was lethargic when she went to the pain clinic this morning, the family member confirms the patient took the methadone and subsequently became short of breath and more lethargic. EMS arrived, initial O2 sat 44% on CPAP improved to 100% in couple of minutes. In the ER ABG showed pH of 7.15, PCO2 118. Patient was given Narcan with subsequent improvement in mentation although still not at baseline.   Work-up significant for hyperkalemia 6.1, proBNP 4000,

## 2023-06-14 NOTE — ED NOTES
1 mg of narcan given again by Luis De Guzman per Saratha Lefort MD.      Olivia Huang RN  06/14/23 9418

## 2023-06-14 NOTE — ACP (ADVANCE CARE PLANNING)
Advance Care Planning     Advance Care Planning Inpatient Note  Spiritual Care Department    Today's Date: 6/14/2023  Unit: [facility]    Received request from IDT Member. Upon review of chart and communication with care team, Spiritual Care will defer advance care planning with patient at this time. . Patient and Child/Children was/were present in the room during visit. Goals of ACP Conversation:  Explained process for HC-POA to children in patient's room    Health Care Decision Makers:     No healthcare decision makers have been documented. Click here to complete [address] including selection of the Healthcare Decision Maker Relationship (ie \"Primary\")  Summary:  Documented Next of Kin, per patient report    Advance Care Planning Documents (Patient Wishes):  None     Assessment:  Patient is on bi-pap, discussion between Dr. Juanita Angel and three of patient's six adult children. Family having difficulty making decisions about intubation because patient said \"do not put me to sleep\". The son wants a POA completed now to clarify him as decision maker but patient is unable due to medication and AMS. Children want access to the patient's test results but the doctor said no if patient does not have POA. They said they want her transferred to , \"I hate this hospital and the people here! \"    Interventions:  Provided education on documents for clarity and greater understanding    Care Preferences Communicated:   No    Outcomes/Plan:  ACP Discussion: Postponed    Electronically signed by Mag Penaloza [facility] on 6/14/2023 at 5:53 PM

## 2023-06-14 NOTE — ED NOTES
Spoke with lab and was assured that the repeat BMP is in process.       Neteu Segura RN  06/14/23 2405

## 2023-06-14 NOTE — ED PROVIDER NOTES
[address] [facility]      Pt Name: Dipak Yoder  MRN: [id number]  [place] 1970  Date of evaluation: 6/14/2023  Provider: Luz Peterson MD    CHIEF COMPLAINT       Chief Complaint   Patient presents with    Shortness of Breath       HISTORY OF PRESENT ILLNESS    Dipak Yoder is a 52 y.o. female who  has a past medical history of Anemia, Asthma, Cerebral artery occlusion with cerebral infarction ([address] CHF (congestive heart failure) ([address] Chronic venous hypertension (idiopathic) with ulcer of bilateral lower extremity ([address] COPD (chronic obstructive pulmonary disease) ([address] Diabetes ([address] Edema, Edema, Foot ulcer ([address] HTN (hypertension), benign, Hyperlipidemia, PAD (peripheral artery disease) ([address] Pressure ulcer of right buttock, stage 3 ([address] and Thyroid disease. who presents to the emergency department with EMS for evaluation of shortness of breath. Patient was at her methadone clinic and became drowsy after receiving methadone and subsequently EMS was called. EMS started patient on CPAP on initial evaluation secondary to oxygen saturation in the 60s. EMS states that on CPAP patient was still in the 70s O2 saturation in route to the hospital.  Patient arrives somnolent but does arouse to verbal stimulus. CPAP was exchanged over to BiPAP. Patient's son arrived shortly after patient arrived and states that the patient did receive a dose of methadone today. He states that the patient had a similar reaction the last time she received methadone as well. Patient receives methadone for history of chronic lower extremity pain and has been on methadone for years per son. He states that the patient and himself were told that the patient should not be taking methadone secondary to her liver and kidney dysfunction as well as her history of COPD. Patient does have chronic lower extremity wounds. Also has lower extremity edema.   No

## 2023-06-14 NOTE — ED NOTES
Spoke with the pt's son to update on pt condition and that  does not have any beds available. Informed pt's son that pt will be going to ICU here at [facility] and he verbalized agreement.       Brenton Hayward RN  06/14/23 8879

## 2023-06-14 NOTE — ED NOTES
Dr. Rachelle Landa MD notified of pt's K of 6.4, no new orders at this time. Dr. Ethan Robb with pulmonology notified of worsening pH and CO2. Pt to be intubated in ICU.       Maite Hines RN  06/14/23 5072

## 2023-06-14 NOTE — ED NOTES
Report given to [facility] to assume care. Pt's worsening labs, meds given, and family's hesitancy to intubate discussed. All questions answered.       Arian Bustillos RN  06/14/23 2858

## 2023-06-15 PROBLEM — J96.01 ACUTE RESPIRATORY FAILURE WITH HYPOXIA AND HYPERCARBIA (HCC): Status: ACTIVE | Noted: 2023-06-15

## 2023-06-15 PROBLEM — J96.02 ACUTE RESPIRATORY FAILURE WITH HYPOXIA AND HYPERCARBIA (HCC): Status: ACTIVE | Noted: 2023-06-15

## 2023-06-15 LAB
ALBUMIN SERPL-MCNC: 3.5 G/DL (ref 3.4–5)
ALBUMIN/GLOB SERPL: 1 {RATIO} (ref 1.1–2.2)
ALP SERPL-CCNC: 153 U/L (ref 40–129)
ALT SERPL-CCNC: 28 U/L (ref 10–40)
ANION GAP SERPL CALCULATED.3IONS-SCNC: 9 MMOL/L (ref 3–16)
AST SERPL-CCNC: 20 U/L (ref 15–37)
BASE EXCESS BLDA CALC-SCNC: 11.2 MMOL/L (ref -3–3)
BASE EXCESS BLDV CALC-SCNC: 15.3 MMOL/L
BILIRUB SERPL-MCNC: 1.1 MG/DL (ref 0–1)
BUN SERPL-MCNC: 47 MG/DL (ref 7–20)
CALCIUM SERPL-MCNC: 9.6 MG/DL (ref 8.3–10.6)
CHLORIDE SERPL-SCNC: 93 MMOL/L (ref 99–110)
CK SERPL-CCNC: 18 U/L (ref 26–192)
CO2 BLDA-SCNC: 46.6 MMOL/L
CO2 BLDV-SCNC: 47 MMOL/L
CO2 SERPL-SCNC: 35 MMOL/L (ref 21–32)
COHGB MFR BLDA: 3.5 % (ref 0–1.5)
COHGB MFR BLDV: 3.6 %
CORTIS AM PEAK SERPL-MCNC: 7.6 UG/DL (ref 4.3–22.4)
CREAT SERPL-MCNC: 0.7 MG/DL (ref 0.6–1.1)
GFR SERPLBLD CREATININE-BSD FMLA CKD-EPI: >60 ML/MIN/{1.73_M2}
GLUCOSE BLD-MCNC: 179 MG/DL (ref 70–99)
GLUCOSE BLD-MCNC: 226 MG/DL (ref 70–99)
GLUCOSE BLD-MCNC: 236 MG/DL (ref 70–99)
GLUCOSE BLD-MCNC: 240 MG/DL (ref 70–99)
GLUCOSE BLD-MCNC: 241 MG/DL (ref 70–99)
GLUCOSE SERPL-MCNC: 279 MG/DL (ref 70–99)
HCO3 BLDA-SCNC: 43.4 MMOL/L (ref 21–29)
HCO3 BLDV-SCNC: 45 MMOL/L (ref 23–29)
HGB BLDA-MCNC: 13.7 G/DL (ref 12–16)
LACTATE BLDV-SCNC: 1.3 MMOL/L (ref 0.4–2)
LDH SERPL L TO P-CCNC: 272 U/L (ref 100–190)
METHGB MFR BLDA: 0.1 %
METHGB MFR BLDV: 0.6 %
O2 THERAPY: ABNORMAL
O2 THERAPY: ABNORMAL
PCO2 BLDA: 104 MMHG (ref 35–45)
PCO2 BLDV: 78.6 MMHG (ref 40–50)
PERFORMED ON: ABNORMAL
PH BLDA: 7.23 [PH] (ref 7.35–7.45)
PH BLDV: 7.37 [PH] (ref 7.35–7.45)
PO2 BLDA: 147 MMHG (ref 75–108)
PO2 BLDV: 43 MMHG
POTASSIUM SERPL-SCNC: 5.3 MMOL/L (ref 3.5–5.1)
POTASSIUM SERPL-SCNC: 5.5 MMOL/L (ref 3.5–5.1)
POTASSIUM SERPL-SCNC: 5.8 MMOL/L (ref 3.5–5.1)
POTASSIUM SERPL-SCNC: 5.9 MMOL/L (ref 3.5–5.1)
POTASSIUM UR-SCNC: 41.5 MMOL/L
PROT SERPL-MCNC: 7.1 G/DL (ref 6.4–8.2)
SAO2 % BLDA: 99.9 %
SAO2 % BLDV: 80 %
SODIUM SERPL-SCNC: 137 MMOL/L (ref 136–145)

## 2023-06-15 PROCEDURE — 6370000000 HC RX 637 (ALT 250 FOR IP): Performed by: INTERNAL MEDICINE

## 2023-06-15 PROCEDURE — 94760 N-INVAS EAR/PLS OXIMETRY 1: CPT

## 2023-06-15 PROCEDURE — 6360000002 HC RX W HCPCS: Performed by: INTERNAL MEDICINE

## 2023-06-15 PROCEDURE — 36415 COLL VENOUS BLD VENIPUNCTURE: CPT

## 2023-06-15 PROCEDURE — 6370000000 HC RX 637 (ALT 250 FOR IP): Performed by: NURSE PRACTITIONER

## 2023-06-15 PROCEDURE — 2580000003 HC RX 258: Performed by: INTERNAL MEDICINE

## 2023-06-15 PROCEDURE — 83615 LACTATE (LD) (LDH) ENZYME: CPT

## 2023-06-15 PROCEDURE — 2700000000 HC OXYGEN THERAPY PER DAY

## 2023-06-15 PROCEDURE — 99233 SBSQ HOSP IP/OBS HIGH 50: CPT | Performed by: INTERNAL MEDICINE

## 2023-06-15 PROCEDURE — APPNB15 APP NON BILLABLE TIME 0-15 MINS: Performed by: NURSE PRACTITIONER

## 2023-06-15 PROCEDURE — 82533 TOTAL CORTISOL: CPT

## 2023-06-15 PROCEDURE — 83605 ASSAY OF LACTIC ACID: CPT

## 2023-06-15 PROCEDURE — 84133 ASSAY OF URINE POTASSIUM: CPT

## 2023-06-15 PROCEDURE — 2060000000 HC ICU INTERMEDIATE R&B

## 2023-06-15 PROCEDURE — 2580000003 HC RX 258: Performed by: NURSE PRACTITIONER

## 2023-06-15 PROCEDURE — 82803 BLOOD GASES ANY COMBINATION: CPT

## 2023-06-15 PROCEDURE — 80053 COMPREHEN METABOLIC PANEL: CPT

## 2023-06-15 PROCEDURE — 82550 ASSAY OF CK (CPK): CPT

## 2023-06-15 PROCEDURE — 94640 AIRWAY INHALATION TREATMENT: CPT

## 2023-06-15 PROCEDURE — 2500000003 HC RX 250 WO HCPCS: Performed by: NURSE PRACTITIONER

## 2023-06-15 PROCEDURE — 84132 ASSAY OF SERUM POTASSIUM: CPT

## 2023-06-15 RX ORDER — METHADONE HYDROCHLORIDE 5 MG/1
15 TABLET ORAL DAILY
Status: DISCONTINUED | OUTPATIENT
Start: 2023-06-15 | End: 2023-06-16

## 2023-06-15 RX ADMIN — IPRATROPIUM BROMIDE AND ALBUTEROL SULFATE 1 DOSE: 2.5; .5 SOLUTION RESPIRATORY (INHALATION) at 00:00

## 2023-06-15 RX ADMIN — ACETAMINOPHEN 650 MG: 325 TABLET ORAL at 08:17

## 2023-06-15 RX ADMIN — CARVEDILOL 12.5 MG: 12.5 TABLET, FILM COATED ORAL at 08:17

## 2023-06-15 RX ADMIN — INSULIN LISPRO 1 UNITS: 100 INJECTION, SOLUTION INTRAVENOUS; SUBCUTANEOUS at 08:50

## 2023-06-15 RX ADMIN — FUROSEMIDE 20 MG: 10 INJECTION, SOLUTION INTRAMUSCULAR; INTRAVENOUS at 17:36

## 2023-06-15 RX ADMIN — IPRATROPIUM BROMIDE AND ALBUTEROL SULFATE 1 DOSE: 2.5; .5 SOLUTION RESPIRATORY (INHALATION) at 16:25

## 2023-06-15 RX ADMIN — ENOXAPARIN SODIUM 40 MG: 100 INJECTION SUBCUTANEOUS at 22:27

## 2023-06-15 RX ADMIN — METHYLPREDNISOLONE SODIUM SUCCINATE 40 MG: 40 INJECTION, POWDER, FOR SOLUTION INTRAMUSCULAR; INTRAVENOUS at 08:25

## 2023-06-15 RX ADMIN — SODIUM CHLORIDE, PRESERVATIVE FREE 20 MG: 5 INJECTION INTRAVENOUS at 08:17

## 2023-06-15 RX ADMIN — SODIUM POLYSTYRENE SULFONATE 15 G: 15 SUSPENSION ORAL; RECTAL at 01:01

## 2023-06-15 RX ADMIN — INSULIN LISPRO 1 UNITS: 100 INJECTION, SOLUTION INTRAVENOUS; SUBCUTANEOUS at 13:07

## 2023-06-15 RX ADMIN — CARVEDILOL 12.5 MG: 12.5 TABLET, FILM COATED ORAL at 17:36

## 2023-06-15 RX ADMIN — ATORVASTATIN CALCIUM 40 MG: 40 TABLET, FILM COATED ORAL at 22:27

## 2023-06-15 RX ADMIN — LEVOTHYROXINE SODIUM 25 MCG: 0.03 TABLET ORAL at 08:30

## 2023-06-15 RX ADMIN — INSULIN GLARGINE 8 UNITS: 100 INJECTION, SOLUTION SUBCUTANEOUS at 22:47

## 2023-06-15 RX ADMIN — SODIUM ZIRCONIUM CYCLOSILICATE 10 G: 10 POWDER, FOR SUSPENSION ORAL at 22:27

## 2023-06-15 RX ADMIN — ACETAMINOPHEN 650 MG: 325 TABLET ORAL at 22:27

## 2023-06-15 RX ADMIN — IPRATROPIUM BROMIDE AND ALBUTEROL SULFATE 1 DOSE: 2.5; .5 SOLUTION RESPIRATORY (INHALATION) at 20:16

## 2023-06-15 RX ADMIN — SODIUM ZIRCONIUM CYCLOSILICATE 10 G: 10 POWDER, FOR SUSPENSION ORAL at 08:17

## 2023-06-15 RX ADMIN — Medication 10 ML: at 17:39

## 2023-06-15 RX ADMIN — AZITHROMYCIN 250 MG: 250 TABLET, FILM COATED ORAL at 08:17

## 2023-06-15 RX ADMIN — Medication 10 ML: at 22:28

## 2023-06-15 RX ADMIN — METHADONE HYDROCHLORIDE 15 MG: 5 TABLET ORAL at 08:56

## 2023-06-15 RX ADMIN — IPRATROPIUM BROMIDE AND ALBUTEROL SULFATE 1 DOSE: 2.5; .5 SOLUTION RESPIRATORY (INHALATION) at 07:44

## 2023-06-15 RX ADMIN — INSULIN LISPRO 1 UNITS: 100 INJECTION, SOLUTION INTRAVENOUS; SUBCUTANEOUS at 17:36

## 2023-06-15 RX ADMIN — FUROSEMIDE 20 MG: 10 INJECTION, SOLUTION INTRAMUSCULAR; INTRAVENOUS at 08:17

## 2023-06-15 RX ADMIN — IPRATROPIUM BROMIDE AND ALBUTEROL SULFATE 1 DOSE: 2.5; .5 SOLUTION RESPIRATORY (INHALATION) at 03:55

## 2023-06-15 RX ADMIN — IPRATROPIUM BROMIDE AND ALBUTEROL SULFATE 1 DOSE: 2.5; .5 SOLUTION RESPIRATORY (INHALATION) at 12:01

## 2023-06-15 RX ADMIN — Medication 10 ML: at 08:26

## 2023-06-15 ASSESSMENT — PAIN DESCRIPTION - ORIENTATION
ORIENTATION: RIGHT;LEFT
ORIENTATION: RIGHT;LEFT

## 2023-06-15 ASSESSMENT — PAIN DESCRIPTION - DESCRIPTORS
DESCRIPTORS: ACHING;DISCOMFORT
DESCRIPTORS: ACHING;DISCOMFORT

## 2023-06-15 ASSESSMENT — PAIN SCALES - GENERAL
PAINLEVEL_OUTOF10: 0
PAINLEVEL_OUTOF10: 10
PAINLEVEL_OUTOF10: 0
PAINLEVEL_OUTOF10: 8
PAINLEVEL_OUTOF10: 10

## 2023-06-15 ASSESSMENT — PAIN DESCRIPTION - ONSET: ONSET: ON-GOING

## 2023-06-15 ASSESSMENT — PAIN DESCRIPTION - PAIN TYPE: TYPE: ACUTE PAIN;CHRONIC PAIN

## 2023-06-15 ASSESSMENT — PAIN DESCRIPTION - LOCATION
LOCATION: KNEE;LEG
LOCATION: LEG

## 2023-06-15 ASSESSMENT — PAIN DESCRIPTION - FREQUENCY: FREQUENCY: CONTINUOUS

## 2023-06-15 NOTE — CARE COORDINATION
DAILY  -turn and reposition every 2 hours  -chair cushion, encourage to reposition self frequently while in chair  -elevate heels, apply liquid barrier film twice daily  Will not continue to follow. Please re-consult if needed.     Specialty Bed Required : Yes   [x] Low Air Loss   [] Pressure Redistribution  [] Fluid Immersion  [] Bariatric  [] Total Pressure Relief  [] Other:     Current Diet: Diet NPO  Dietician consult:  Yes    Discharge Plan:  Placement for patient upon discharge:TBD  Patient appropriate for Outpatient [address]: N/A    Referrals:  []   [] [facility]  [] Supplies  [] Other    Patient/Caregiver Teaching:  Level of patient/caregiver understanding able to:   [] Indicates understanding       [] Needs reinforcement  [] Unsuccessful      [x] Verbal Understanding  [] Demonstrated understanding       [] No evidence of learning  [] Refused teaching         [] N/A       Electronically signed by Maddison Leal RN, CWOCN on 6/15/2023 at 1:37 PM

## 2023-06-15 NOTE — DISCHARGE INSTRUCTIONS
Extra Heart Failure sites:     https://"[employer].com/   --- this is American Heart Association interactive Healthier Living with Heart Failure guidebook. Please click hyperlink or copy / paste link into search bar. Use your mouse to scroll through the pages. Lots of information about weight monitoring, diet tips, activity, meds, etc    HF [place] uriel  -- this is a free smart phone uriel available for iPhone and Android download. Use your phone to track sodium / fluid intake, zone tool symptom tracking, weights, medications, etc. Click on this hyperlink  HF [place] [employer]riel   for QR code for easy download. DASH (Dietary Approach to Stop Hypertension) diet --  [employer] -- this diet is a flexible eating plan that promotes heart healthy eating style. Click on hyperlink or copy / paste link into search bar. Lots of low sodium recipes and tips.     [employer]  -- more free recipes

## 2023-06-15 NOTE — PROGRESS NOTES
4 Eyes Skin Assessment     NAME:  Marcial Cardenas  YOB: 1970  MEDICAL RECORD NUMBER:  [id number]    The patient is being assessed for  Shift Handoff    I agree that at least one RN has performed a thorough Head to Toe Skin Assessment on the patient. ALL assessment sites listed below have been assessed. Areas assessed by both nurses:    Head, Face, Ears, Shoulders, Back, Chest, Arms, Elbows, Hands, Sacrum. Buttock, Coccyx, Ischium, Legs. Feet and Heels, and Under Medical Devices         Does the Patient have a Wound? Yes wound(s) were present on assessment.  LDA wound assessment was Initiated and completed by RN       Jose Prevention initiated by RN: Yes  Wound Care Orders initiated by RN: Yes    Pressure Injury (Stage 3,4, Unstageable, DTI, NWPT, and Complex wounds) if present, place Wound referral order by RN under : Yes    New Ostomies, if present place, Ostomy referral order under : No     Nurse 1 eSignature: Electronically signed by Darius Rodriguez RN on 6/14/23 at 8:17 PM EDT    **SHARE this note so that the co-signing nurse can place an eSignature**    Nurse 2 eSignature: Electronically signed by Brenden Mcmahon RN on 6/15/23 at 12:18 AM EDT

## 2023-06-15 NOTE — PLAN OF CARE
Problem: Discharge Planning  Goal: Discharge to home or other facility with appropriate resources  Outcome: Progressing  Flowsheets (Taken 6/15/2023 0000)  Discharge to home or other facility with appropriate resources: Identify barriers to discharge with patient and caregiver     Problem: Pain  Goal: Verbalizes/displays adequate comfort level or baseline comfort level  Outcome: Progressing  Flowsheets  Taken 6/15/2023 0000 by Rama Ruby RN  Verbalizes/displays adequate comfort level or baseline comfort level:   Encourage patient to monitor pain and request assistance   Assess pain using appropriate pain scale  Taken 6/14/2023 1947 by Rama Ruby RN  Verbalizes/displays adequate comfort level or baseline comfort level:   Assess pain using appropriate pain scale   Encourage patient to monitor pain and request assistance  Taken 6/14/2023 1715 by Patricia Mtz RN  Verbalizes/displays adequate comfort level or baseline comfort level:   Encourage patient to monitor pain and request assistance   Assess pain using appropriate pain scale     Problem: Safety - Adult  Goal: Free from fall injury  Outcome: Progressing     Problem: Skin/Tissue Integrity  Goal: Absence of new skin breakdown  Description: 1. Monitor for areas of redness and/or skin breakdown  2. Assess vascular access sites hourly  3. Every 4-6 hours minimum:  Change oxygen saturation probe site  4. Every 4-6 hours:  If on nasal continuous positive airway pressure, respiratory therapy assess nares and determine need for appliance change or resting period.   Outcome: Progressing

## 2023-06-15 NOTE — PROGRESS NOTES
Call back from Dr. Na Patton, nephrologist. Updated on critical potassium level. See new orders placed.

## 2023-06-15 NOTE — PLAN OF CARE
Problem: Discharge Planning  Goal: Discharge to home or other facility with appropriate resources  6/15/2023 1419 by Maria Luz Ruiz RN  Outcome: Progressing  Flowsheets (Taken 6/15/2023 1419)  Discharge to home or other facility with appropriate resources: Identify barriers to discharge with patient and caregiver  6/15/2023 0644 by Chelsey Lee RN  Outcome: Progressing  Flowsheets (Taken 6/15/2023 0000)  Discharge to home or other facility with appropriate resources: Identify barriers to discharge with patient and caregiver     Problem: Pain  Goal: Verbalizes/displays adequate comfort level or baseline comfort level  6/15/2023 1419 by Maria Luz Ruiz RN  Outcome: Progressing  Flowsheets (Taken 6/15/2023 1419)  Verbalizes/displays adequate comfort level or baseline comfort level:   Encourage patient to monitor pain and request assistance   Assess pain using appropriate pain scale  6/15/2023 0644 by Chelsey Lee RN  Outcome: Progressing  Flowsheets  Taken 6/15/2023 0000 by Chelsey Lee RN  Verbalizes/displays adequate comfort level or baseline comfort level:   Encourage patient to monitor pain and request assistance   Assess pain using appropriate pain scale  Taken 6/14/2023 1947 by Chelsey Lee RN  Verbalizes/displays adequate comfort level or baseline comfort level:   Assess pain using appropriate pain scale   Encourage patient to monitor pain and request assistance  Taken 6/14/2023 1715 by Julia Duran RN  Verbalizes/displays adequate comfort level or baseline comfort level:   Encourage patient to monitor pain and request assistance   Assess pain using appropriate pain scale     Problem: Safety - Adult  Goal: Free from fall injury  6/15/2023 1419 by Maria Luz Ruiz RN  Outcome: Progressing  6/15/2023 0644 by Chelsey Lee RN  Outcome: Progressing     Problem: Skin/Tissue Integrity  Goal: Absence of new skin breakdown  Description: 1. Monitor for areas of redness and/or skin breakdown  2.   Assess vascular

## 2023-06-15 NOTE — PROGRESS NOTES
06/15/23 1149   Encounter Summary   Encounter Overview/Reason  Advance Care Planning  (f/up on interest to complete HCPOA)   Service Provided For: Patient   Referral/Consult From: Omar   Last Encounter  06/15/23  (Visit, blessing, attempt to discuss HCPOA - pt drowsy and not interested in discussing need to designate HCPOA SM)   Complexity of Encounter Moderate   Begin Time 0930   End Time  0950   Total Time Calculated 20 min   Encounter    Type Follow up   Grief, Loss, and Adjustments   Type Life Adjustments; Adjustment to illness   Advance Care Planning   Type Refused ACP conversation   Assessment/Intervention/Outcome   Assessment Anxious; Concerns with suffering   Intervention Discussed illness injury and its impact; Explored/Affirmed feelings, thoughts, concerns   Outcome Expressed feelings, needs, and concerns

## 2023-06-15 NOTE — PROGRESS NOTES
Throughout night patient has become more responsive. Pt has received 2 kayexalate enemas as well as IV insulin to lower potassium levels. Son, Ela Castillo, has called and has been given updates on patient status. Throughout night patient has been readjusting Bipap mask causing irritation to her skin, and the Bipap mask to leak. Patient has been educated multiple times about importance of not adjusting Bipap mask after RT has fitted mask to face. Pt continuing to adjust mask causing irritation and leaks.

## 2023-06-15 NOTE — PROGRESS NOTES
Omar Marin MD    Hospitalist Progress Note      Name:  Abiel Laurent /Age/Sex: 1970  (52 y.o. female)   MRN & CSN:  [id number] & [id number] Admission Date/Time: 2023 10:43 AM   Location:  E5O-9243 PCP: [facility]       I saw and examined the patient on 6/15/2023 at 10:15 AM.    Hospital Day: 2  Barriers to discharge: Encephalopathy, hypoxia  Assessment and Plan:     Abiel Laurent is a 52 y.o. female admitted for acute hypoxic hypercapnic respite failure      Acute on chronic hypoxic hypercapnic respite failure. On BiPAP, slowly improving. Again drowsy and lethargic this morning after taking methadone. On 6 L nasal cannula  Acute metabolic encephalopathy due to hypercarbia and methadone. Nonfocal  Acute COPD exacerbation. Chest x-ray nonacute. Nebs  Hyperkalemia 6.1. Status post hyperkalemia protocol in ER, repeat BMP, nephrology following  Mild exacerbation of heart failure. Elevated proBNP with imaging concerning for volume overload. Will do gentle diuresis, monitor kidney functions  Elevated nontrending troponins. No chest pain reported. Will trend for now  Hold torsemide, switch to IV, Laosartan  Chronic pain syndrome on methadone. Patient again drowsy after taking methadone this morning, pharmacy has been consulted to contact and verify methadone dosing     DVT prophylaxis Lovenox  Full code     Subjective:      patient seen and examined at bedside. Per RN she was in pain this morning and received methadone. On my examination patient lethargic drowsy difficult to stay awake. Discussed with RN to see if methadone clinic number can be obtained from family. Close clinical monitoring. Full code    Objective:      Intake/Output Summary (Last 24 hours) at 6/15/2023 1015  Last data filed at 6/15/2023 0800  Gross per 24 hour   Intake 250.37 ml   Output 3630 ml   Net -3379.63 ml      Vitals:   Vitals:    06/15/23 0751   BP:    Pulse: 79   Resp: 14   Temp:    SpO2:

## 2023-06-15 NOTE — PROGRESS NOTES
4 Eyes Skin Assessment     NAME:  Jr Laura  YOB: 1970  MEDICAL RECORD NUMBER:  [id number]    The patient is being assessed for  Admission    I agree that at least one RN has performed a thorough Head to Toe Skin Assessment on the patient. ALL assessment sites listed below have been assessed. Areas assessed by both nurses:    Head, Face, Ears, Shoulders, Back, Chest, Arms, Elbows, Hands, Sacrum. Buttock, Coccyx, Ischium, Legs. Feet and Heels, and Under Medical Devices         Does the Patient have a Wound? Yes wound(s) were present on assessment.  LDA wound assessment was Initiated and completed by RN       Jose Prevention initiated by RN: Yes  Wound Care Orders initiated by RN: Yes    Pressure Injury (Stage 3,4, Unstageable, DTI, NWPT, and Complex wounds) if present, place Wound referral order by RN under : Yes    New Ostomies, if present place, Ostomy referral order under : No     Nurse 1 eSignature: Electronically signed by Harry Juarez RN on 6/14/23 at 8:16 PM EDT    **SHARE this note so that the co-signing nurse can place an eSignature**    Nurse 2 eSignature: Electronically signed by Shannon Welsh RN on 6/14/23 at 8:17 PM EDT

## 2023-06-15 NOTE — PROGRESS NOTES
Dr. Jennifer Cordova, Nephrologist, updated on patient potassium labs and interventions that took place throughout night, No new orders at this time.

## 2023-06-15 NOTE — CONSULTS
[facility]  Phone: [phone]  Fax: [phone]  [facility]. com         Reason for Consult:  Hyperkalemia  Requesting Physician:  Dr. Brooks Cornejo      History Obtained From:  patient, electronic medical record    History of Present Ilness: 53 y/o WF seen for evaluation of persistent Hyperkalemia  She has h/o CHF, COPD CAD DM AND CH RESP Failure on home O2  She was recently hospitalized at [facility] with resp failure Her Torsemide was discontinued on discharge from the hospital on 5/29/23  She had normal renal function on discharge   Admitted  with worsening SOB and lethargy after she had been to Methadone clinic  ABG in ER showed pH 7.15 PCO2 118 Her baseline PCO2  appears to be ~ 65 Also noted to be Hyperkalemia with K+ 6.5 meq BUN 55 mg   Treated medically overnight but K+ remains persistently elevated at 5.9 meq this AM  She had been on Olmesartan   CXR showed evidence of pul congestion Started on Lasix and has had good UOP  No report of obvious GI bleed hematomas or easy bruising         Past Medical History:        Diagnosis Date    Anemia     Asthma     Cerebral artery occlusion with cerebral infarction ([address]     CHF (congestive heart failure) ([address]     Chronic venous hypertension (idiopathic) with ulcer of bilateral lower extremity ([address] 4/24/2023    Has wounds bilateral lower legs    COPD (chronic obstructive pulmonary disease) ([address]     Diabetes ([address]     Edema     Edema     Foot ulcer (HCC)     HTN (hypertension), benign 11/19/2013    Hyperlipidemia     PAD (peripheral artery disease) ([facility])     Pressure ulcer of right buttock, stage 3 ([address] 11/22/2022    Thyroid disease        Past Surgical History:        Procedure Laterality Date    HERNIA REPAIR      TUBAL LIGATION      VASCULAR SURGERY Left     left arterial stent       Home Medications:    No current facility-administered medications on file prior to encounter.      Current Outpatient Medications on File Prior to Encounter   Medication
status    Objective:   PHYSICAL EXAM:  Blood pressure (!) 112/52, pulse 68, temperature 96.8 °F (36 °C), temperature source Temporal, resp. rate 17, weight 216 lb 7.9 oz (98.2 kg), SpO2 97 %.'  Gen: On BiPAP  Eyes: PERRL. No sclera icterus. No conjunctival injection. ENT: No discharge. Pharynx clear. External appearance of ears and nose normal.  Neck: Trachea midline. No obvious mass. Resp: breathing with ventilator. CV: Regular rate. Regular rhythm. No murmur or rub. No edema. GI: Non-tender. Non-distended. No hernia. Skin: Legs with erythema and multiple wounds. Clear fluid leaking from skin. Lymph: No cervical LAD. No supraclavicular LAD. M/S: No cyanosis. No clubbing. No joint deformity. Neuro: Decreased mental status. Data Reviewed:   LABS:  CBC:   Recent Labs     06/14/23  1051   WBC 3.1*   HGB 13.7   HCT 51.9*   MCV 85.0   PLT 87*     BMP:   Recent Labs     06/14/23  1051 06/14/23  1258     --    K 6.5* 6.1*   CL 95*  --    CO2 34*  --    BUN 54*  --    CREATININE 0.7  --      LIVER PROFILE:   Recent Labs     06/14/23  1051   AST 28   ALT 34   BILITOT 1.3*   ALKPHOS 159*     PT/INR: No results for input(s): PROTIME, INR in the last 72 hours. APTT: No results for input(s): APTT in the last 72 hours. UA:No results for input(s): NITRITE, COLORU, PHUR, LABCAST, WBCUA, RBCUA, MUCUS, TRICHOMONAS, YEAST, BACTERIA, CLARITYU, SPECGRAV, LEUKOCYTESUR, UROBILINOGEN, BILIRUBINUR, BLOODU, GLUCOSEU, AMORPHOUS in the last 72 hours. Invalid input(s): KETONESU  No results for input(s): PHART, CEK6XYF, PO2ART in the last 72 hours. Radiology Review:  Pertinent images / reports were reviewed as a part of this visit. CT Chest w/ contrast: No results found for this or any previous visit. CT Chest w/o contrast: No results found for this or any previous visit. CTPA: No results found for this or any previous visit.       CXR PA/LAT: Results for orders placed during the hospital

## 2023-06-15 NOTE — PROGRESS NOTES
1715: Pt arrived to ICU Rm 2122 via stretcher on BiPAP accompanied by ED Rn on monitor in stable condition. Moved into ICU bed. ICU monitors applied. Report received from ED RN, [id number] [facility]. 4 eye skin assessment completed with Britany, see note. VSS on BiPAP. NSR on tele.

## 2023-06-16 VITALS
RESPIRATION RATE: 20 BRPM | WEIGHT: 194.67 LBS | SYSTOLIC BLOOD PRESSURE: 180 MMHG | OXYGEN SATURATION: 95 % | BODY MASS INDEX: 34.48 KG/M2 | TEMPERATURE: 98.4 F | HEART RATE: 74 BPM | DIASTOLIC BLOOD PRESSURE: 91 MMHG

## 2023-06-16 LAB
ALBUMIN SERPL-MCNC: 4.1 G/DL (ref 3.4–5)
ANION GAP SERPL CALCULATED.3IONS-SCNC: 4 MMOL/L (ref 3–16)
BUN SERPL-MCNC: 32 MG/DL (ref 7–20)
CALCIUM SERPL-MCNC: 9.9 MG/DL (ref 8.3–10.6)
CHLORIDE SERPL-SCNC: 91 MMOL/L (ref 99–110)
CO2 SERPL-SCNC: 45 MMOL/L (ref 21–32)
CREAT SERPL-MCNC: 0.6 MG/DL (ref 0.6–1.1)
GFR SERPLBLD CREATININE-BSD FMLA CKD-EPI: >60 ML/MIN/{1.73_M2}
GLUCOSE BLD-MCNC: 148 MG/DL (ref 70–99)
GLUCOSE BLD-MCNC: 178 MG/DL (ref 70–99)
GLUCOSE SERPL-MCNC: 163 MG/DL (ref 70–99)
PERFORMED ON: ABNORMAL
PERFORMED ON: ABNORMAL
PHOSPHATE SERPL-MCNC: 1.6 MG/DL (ref 2.5–4.9)
POTASSIUM SERPL-SCNC: 4.1 MMOL/L (ref 3.5–5.1)
SODIUM SERPL-SCNC: 140 MMOL/L (ref 136–145)

## 2023-06-16 PROCEDURE — 2700000000 HC OXYGEN THERAPY PER DAY

## 2023-06-16 PROCEDURE — 80069 RENAL FUNCTION PANEL: CPT

## 2023-06-16 PROCEDURE — 6370000000 HC RX 637 (ALT 250 FOR IP): Performed by: INTERNAL MEDICINE

## 2023-06-16 PROCEDURE — 94640 AIRWAY INHALATION TREATMENT: CPT

## 2023-06-16 PROCEDURE — 2580000003 HC RX 258: Performed by: INTERNAL MEDICINE

## 2023-06-16 PROCEDURE — 92610 EVALUATE SWALLOWING FUNCTION: CPT

## 2023-06-16 PROCEDURE — 6370000000 HC RX 637 (ALT 250 FOR IP): Performed by: NURSE PRACTITIONER

## 2023-06-16 PROCEDURE — 36415 COLL VENOUS BLD VENIPUNCTURE: CPT

## 2023-06-16 PROCEDURE — 99232 SBSQ HOSP IP/OBS MODERATE 35: CPT | Performed by: INTERNAL MEDICINE

## 2023-06-16 PROCEDURE — 94760 N-INVAS EAR/PLS OXIMETRY 1: CPT

## 2023-06-16 PROCEDURE — 6360000002 HC RX W HCPCS: Performed by: INTERNAL MEDICINE

## 2023-06-16 RX ORDER — AMLODIPINE BESYLATE 5 MG/1
5 TABLET ORAL DAILY
Qty: 30 TABLET | Refills: 3 | Status: SHIPPED | OUTPATIENT
Start: 2023-06-16

## 2023-06-16 RX ORDER — AMLODIPINE BESYLATE 5 MG/1
5 TABLET ORAL DAILY
Status: DISCONTINUED | OUTPATIENT
Start: 2023-06-16 | End: 2023-06-16 | Stop reason: HOSPADM

## 2023-06-16 RX ORDER — METHADONE HYDROCHLORIDE 5 MG/5ML
15 SOLUTION ORAL DAILY
Qty: 15 ML | Refills: 0 | Status: SHIPPED | OUTPATIENT
Start: 2023-06-16 | End: 2023-06-17

## 2023-06-16 RX ORDER — METHADONE HYDROCHLORIDE 10 MG/1
10 TABLET ORAL DAILY
Status: DISCONTINUED | OUTPATIENT
Start: 2023-06-16 | End: 2023-06-16

## 2023-06-16 RX ORDER — TRAMADOL HYDROCHLORIDE 50 MG/1
50 TABLET ORAL ONCE
Status: COMPLETED | OUTPATIENT
Start: 2023-06-16 | End: 2023-06-16

## 2023-06-16 RX ORDER — METHADONE HYDROCHLORIDE 10 MG/1
10 TABLET ORAL DAILY
Status: DISCONTINUED | OUTPATIENT
Start: 2023-06-17 | End: 2023-06-16 | Stop reason: HOSPADM

## 2023-06-16 RX ORDER — FUROSEMIDE 40 MG/1
40 TABLET ORAL DAILY
Status: DISCONTINUED | OUTPATIENT
Start: 2023-06-16 | End: 2023-06-16 | Stop reason: HOSPADM

## 2023-06-16 RX ORDER — IPRATROPIUM BROMIDE AND ALBUTEROL SULFATE 2.5; .5 MG/3ML; MG/3ML
1 SOLUTION RESPIRATORY (INHALATION) 2 TIMES DAILY
Status: DISCONTINUED | OUTPATIENT
Start: 2023-06-16 | End: 2023-06-16 | Stop reason: HOSPADM

## 2023-06-16 RX ADMIN — CARVEDILOL 12.5 MG: 12.5 TABLET, FILM COATED ORAL at 09:37

## 2023-06-16 RX ADMIN — TRAMADOL HYDROCHLORIDE 50 MG: 50 TABLET, COATED ORAL at 02:30

## 2023-06-16 RX ADMIN — FUROSEMIDE 20 MG: 10 INJECTION, SOLUTION INTRAMUSCULAR; INTRAVENOUS at 09:37

## 2023-06-16 RX ADMIN — METHADONE HYDROCHLORIDE 10 MG: 10 TABLET ORAL at 09:37

## 2023-06-16 RX ADMIN — AMLODIPINE BESYLATE 5 MG: 5 TABLET ORAL at 13:09

## 2023-06-16 RX ADMIN — IPRATROPIUM BROMIDE AND ALBUTEROL SULFATE 1 DOSE: 2.5; .5 SOLUTION RESPIRATORY (INHALATION) at 07:46

## 2023-06-16 RX ADMIN — AZITHROMYCIN 250 MG: 250 TABLET, FILM COATED ORAL at 09:37

## 2023-06-16 RX ADMIN — LEVOTHYROXINE SODIUM 25 MCG: 0.03 TABLET ORAL at 05:17

## 2023-06-16 RX ADMIN — DICLOFENAC SODIUM 2 G: 10 GEL TOPICAL at 03:53

## 2023-06-16 RX ADMIN — Medication 10 ML: at 09:40

## 2023-06-16 RX ADMIN — IPRATROPIUM BROMIDE AND ALBUTEROL SULFATE 1 DOSE: 2.5; .5 SOLUTION RESPIRATORY (INHALATION) at 00:01

## 2023-06-16 ASSESSMENT — PAIN DESCRIPTION - DESCRIPTORS
DESCRIPTORS: ACHING;DISCOMFORT
DESCRIPTORS: ACHING

## 2023-06-16 ASSESSMENT — PAIN SCALES - GENERAL
PAINLEVEL_OUTOF10: 9

## 2023-06-16 ASSESSMENT — PAIN - FUNCTIONAL ASSESSMENT
PAIN_FUNCTIONAL_ASSESSMENT: PREVENTS OR INTERFERES SOME ACTIVE ACTIVITIES AND ADLS
PAIN_FUNCTIONAL_ASSESSMENT: PREVENTS OR INTERFERES SOME ACTIVE ACTIVITIES AND ADLS

## 2023-06-16 ASSESSMENT — PAIN DESCRIPTION - LOCATION
LOCATION: KNEE
LOCATION: KNEE;LEG

## 2023-06-16 ASSESSMENT — PAIN DESCRIPTION - ORIENTATION
ORIENTATION: RIGHT;LEFT
ORIENTATION: RIGHT;LEFT

## 2023-06-16 ASSESSMENT — PAIN DESCRIPTION - PAIN TYPE
TYPE: CHRONIC PAIN;ACUTE PAIN
TYPE: CHRONIC PAIN

## 2023-06-16 ASSESSMENT — PAIN DESCRIPTION - ONSET
ONSET: ON-GOING
ONSET: ON-GOING

## 2023-06-16 ASSESSMENT — PAIN DESCRIPTION - FREQUENCY
FREQUENCY: CONTINUOUS
FREQUENCY: CONTINUOUS

## 2023-06-16 NOTE — PROGRESS NOTES
Pulmonary Critical Care Progress Note     Patient's name:  Gerald Guerrero  Medical Record Number: [id number]  Patient's account/billing number: [de-identified]  Patient's YOB: 1970  Age: 52 y.o. Date of Admission: 6/14/2023 10:43 AM  Date of Consult: 6/16/2023      Primary Care Physician: [facility]      Code Status: Full Code    Chief complaint: acute on chronic respiratory failure with hypoxia and hypercapnia     Assessment and Plan     Acute on chronic respiratory failure with hypoxia and hypercapnia   COPD  CHF  Substance abuse    Plan:  Encourage bipap use Qhs, patient refusing  Titrate O2 to keep sat 88-92%  Aspiration precautions  Diuresis             Overnight:  Afebrile  No chest pain  Cough and sob     REVIEW OF SYSTEMS:  Review of Systems -   General ROS: negative  Psychological ROS: negative  Ophthalmic ROS: negative  ENT ROS: negative  Allergy and Immunology ROS: negative  Hematological and Lymphatic ROS: negative  Endocrine ROS: negative  Breast ROS: negative  Respiratory ROS: sob, cough   Cardiovascular ROS: no chest pain or dyspnea on exertion  Gastrointestinal ROS:negative  Genito-Urinary ROS: negative  Musculoskeletal ROS: negative  Neurological ROS: negative  Dermatological ROS: negative        Physical Exam:    Vitals: BP (!) 180/77   Pulse 69   Temp 97.8 °F (36.6 °C) (Oral)   Resp 18   Wt 194 lb 10.7 oz (88.3 kg)   SpO2 96%   BMI 34.48 kg/m²     Last Body weight:   Wt Readings from Last 3 Encounters:   06/16/23 194 lb 10.7 oz (88.3 kg)   11/28/22 193 lb 2 oz (87.6 kg)   11/23/22 191 lb 5.8 oz (86.8 kg)       Body Mass Index : Body mass index is 34.48 kg/m². Intake and Output summary:   Intake/Output Summary (Last 24 hours) at 6/16/2023 1151  Last data filed at 6/16/2023 0959  Gross per 24 hour   Intake 250 ml   Output 3150 ml   Net -2900 ml       Physical Examination:     Gen:  No acute distress. Eyes: PERRL. Anicteric sclera. No conjunctival injection.

## 2023-06-16 NOTE — PROGRESS NOTES
Pt c/o of 9/10 bilateral knee pain this shift. PRN Tylenol given with no relief. Perfect serve sent to hospitalitis. One time dose of Tramadol ordered, see eMAR. Fall precautions in place. Call light within reach. Will continue to monitor.      Electronically signed by Francisco Javier Russo RN on 6/16/2023 at 3:39 AM

## 2023-06-16 NOTE — PLAN OF CARE
Problem: Discharge Planning  Goal: Discharge to home or other facility with appropriate resources  6/16/2023 1202 by Sharron De La Paz RN  Outcome: Progressing     Problem: Pain  Goal: Verbalizes/displays adequate comfort level or baseline comfort level  6/16/2023 1202 by Sharron De La Paz RN  Outcome: Progressing     Problem: Safety - Adult  Goal: Free from fall injury  6/16/2023 1202 by Sharron De La Paz RN  Outcome: Progressing     Problem: Skin/Tissue Integrity  Goal: Absence of new skin breakdown  Description: 1. Monitor for areas of redness and/or skin breakdown  2. Assess vascular access sites hourly  3. Every 4-6 hours minimum:  Change oxygen saturation probe site  4. Every 4-6 hours:  If on nasal continuous positive airway pressure, respiratory therapy assess nares and determine need for appliance change or resting period.   6/16/2023 1202 by Sharron De La Paz RN  Outcome: Progressing     Problem: Chronic Conditions and Co-morbidities  Goal: Patient's chronic conditions and co-morbidity symptoms are monitored and maintained or improved  6/16/2023 1202 by Sharron De La Paz RN  Outcome: Progressing     Problem: Respiratory - Adult  Goal: Achieves optimal ventilation and oxygenation  6/16/2023 1202 by Sharron De La Paz RN  Outcome: Progressing     Problem: Cardiovascular - Adult  Goal: Maintains optimal cardiac output and hemodynamic stability  6/16/2023 1202 by Sharron De La Paz RN  Outcome: Progressing     Problem: Cardiovascular - Adult  Goal: Absence of cardiac dysrhythmias or at baseline  6/16/2023 1202 by Sharron De La Paz RN  Outcome: Progressing     Problem: Metabolic/Fluid and Electrolytes - Adult  Goal: Electrolytes maintained within normal limits  6/16/2023 1202 by Sharron De La Paz RN  Outcome: Progressing     Problem: Metabolic/Fluid and Electrolytes - Adult  Goal: Hemodynamic stability and optimal renal function maintained  6/16/2023 1202 by Sharron De La Paz RN  Outcome: Progressing

## 2023-06-16 NOTE — PROGRESS NOTES
Patient discharged to home via wheelchair with daughter at 44. Sent with belongings including clothes, meds to beds, clothing and toiletries. Discharge instructions reviewed with patient who verbalized understanding. Instructed patient on blood pressure monitoring and new BP medication.   Electronically signed by Herber Nunn RN on 6/16/2023 at 5:50 PM

## 2023-06-16 NOTE — DISCHARGE SUMMARY
Discharge Summary  Moo Ambrocio MD     Name:  Werner Hills /Age/Sex: 1970  (52 y.o. female)   MRN & CSN:  [id number] & [id number] Admission Date/Time: 2023 10:43 AM   Attending:  Moo Ambrocio MD Discharging Physician: Moo Ambrocio MD     Hospital Course:     Werner Hills is a 52 y.o. female admitted for acute hypoxic hypercapnic respite failure        Acute on chronic hypoxic hypercapnic respite failure. Mx with On BiPAP. Tolerating current dose of methadone well. On 5 L nasal cannula which is her baseline. Resume upon discharge. [place] to go home. Acute metabolic encephalopathy due to hypercarbia and methadone. Nonfocal.  Resolved, alert and oriented x3  Acute COPD exacerbation. Resolved  Hyperkalemia 6.1. Resolved, appreciate nephrology input. We will switch losartan to amlodipine due to persistent hyperkalemia. Advised patient to follow-up with PCP  Mild exacerbation of heart failure. Resolved, resume home diuretics upon discharge  Elevated nontrending troponins. No chest pain reported. Will trend for now  Chronic pain syndrome on methadone. Apparently Dose of methadone was recently decreased to 15 mg, dose confirmed with pain clinic. I will discharge patient on 10 mg of methadone, patient is already supposed to see pain team as outpatient, will encourage to keep appointment for further management. I called and spoke with patient's son Mr. Luzma Burton at 12:30 PM about this plan who agrees. DVT prophylaxis Lovenox  Full code       The patient expressed appropriate understanding of and agreement with the discharge recommendations, medications, and plan. Consults this admission:  IP CONSULT TO HOSPITALIST  IP CONSULT TO CRITICAL CARE  IP CONSULT TO NEPHROLOGY    Discharge Instruction:     Follow up appointments:     No follow-up provider specified.     Primary care physician:  within 2 weeks    Diet:  cardiac diet     Activity: activity as tolerated and no driving

## 2023-06-16 NOTE — PROGRESS NOTES
Pt refusing telemetry monitor at this time. Education provided. Hospitalist notified. Will continue to monitor.     Electronically signed by Louann Fleming RN on 6/15/2023 at 9:24 PM

## 2023-06-16 NOTE — PLAN OF CARE
Problem: Discharge Planning  Goal: Discharge to home or other facility with appropriate resources  6/16/2023 0417 by Calin Lezama RN  Outcome: Progressing     Problem: Pain  Goal: Verbalizes/displays adequate comfort level or baseline comfort level  6/16/2023 0417 by Calin Lezama RN  Outcome: Progressing     Problem: Safety - Adult  Goal: Free from fall injury  6/16/2023 0417 by Calin Lezama RN  Outcome: Progressing     Problem: Skin/Tissue Integrity  Goal: Absence of new skin breakdown  Description: 1. Monitor for areas of redness and/or skin breakdown  2. Assess vascular access sites hourly  3. Every 4-6 hours minimum:  Change oxygen saturation probe site  4. Every 4-6 hours:  If on nasal continuous positive airway pressure, respiratory therapy assess nares and determine need for appliance change or resting period.   6/16/2023 0417 by Calin Lezama RN  Outcome: Progressing     Problem: Chronic Conditions and Co-morbidities  Goal: Patient's chronic conditions and co-morbidity symptoms are monitored and maintained or improved  6/16/2023 0417 by Calin Lezama RN  Outcome: Progressing     Problem: Respiratory - Adult  Goal: Achieves optimal ventilation and oxygenation  6/16/2023 0417 by Calin Lezama RN  Outcome: Progressing     Problem: Cardiovascular - Adult  Goal: Maintains optimal cardiac output and hemodynamic stability  6/16/2023 0417 by Calin Lezama RN  Outcome: Progressing     Problem: Metabolic/Fluid and Electrolytes - Adult  Goal: Electrolytes maintained within normal limits  Outcome: Progressing

## 2023-06-16 NOTE — PROGRESS NOTES
Facility/Department: [facility] PROGRESSIVE CARE  Initial Assessment  DYSPHAGIA BEDSIDE SWALLOW EVALUATION     Patient: Silvia Orourke   : 1970   MRN: [id number]      Evaluation Date: 2023   Admitting Diagnosis: Metabolic encephalopathy [Q70.77]  Hyperkalemia [E87.5]  Elevated troponin [R77.8]  Acute respiratory failure with hypoxia and hypercarbia (HCC) [J96.01, J96.02]  Type 2 diabetes mellitus with hyperglycemia, with long-term current use of insulin (HCC) [E11.65, Z79.4]  Acute on chronic congestive heart failure, unspecified heart failure type ([facility] [I50.9]  Pain: Did not state                                                         Chart Review:   H&P:   Silvia Orourke is a 52 y.o. female admitted for acute hypoxic hypercapnic respite failure   Acute on chronic hypoxic hypercapnic respite failure. On BiPAP, slowly improving. Again drowsy and lethargic this morning after taking methadone. On 6 L nasal cannula  Acute metabolic encephalopathy due to hypercarbia and methadone. Nonfocal  Acute COPD exacerbation. Chest x-ray nonacute. Nebs  Hyperkalemia 6.1. Status post hyperkalemia protocol in ER, repeat BMP, nephrology following  Mild exacerbation of heart failure. Elevated proBNP with imaging concerning for volume overload. Will do gentle diuresis, monitor kidney functions  Elevated nontrending troponins. No chest pain reported. Will trend for now  Hold torsemide, switch to IV, Laosartan  Chronic pain syndrome on methadone. Patient again drowsy after taking methadone this morning, pharmacy has been consulted to contact and verify methadone dosing    Current Diet Level (prior to evaluation): NPO  NPO   Respiratory Status:   []Room Air   [x]O2 via nasal cannula - 5L   []Other:    Imaging:  Chest X-ray: 2023  IMPRESSION:  Findings of CHF with cardiomegaly and interstitial edema.     Modified Barium Swallow Study: n/a per EMR review    Reason for referral: SLP evaluation orders

## 2023-06-16 NOTE — PROGRESS NOTES
Patient chair alarm going off upon arrival to shift. Patient states she is walking fine and does not need to wait for someone to be with her. She is refusing telemetry and pulse oximetry and stating she is wanting to leave. Night RN reported patient did not wear bipap last night. Chair alarm still on and educated patient on the need to call for assistance on getting up.   Electronically signed by Yfn Valenzuela RN on 6/16/2023 at 7:37 AM

## 2023-06-16 NOTE — PROGRESS NOTES
[facility]  Phone: [phone]  Fax: [phone]  [facility]. com         Reason for Consult:  Hyperkalemia  Requesting Physician:  Dr. Brooks Cornejo      History Obtained From:  patient, electronic medical record    History of Present Ilness: 53 y/o WF seen for evaluation of persistent Hyperkalemia  She has h/o CHF, COPD CAD DM AND CH RESP Failure on home O2  She was recently hospitalized at [facility] with resp failure Her Torsemide was discontinued on discharge from the hospital on 5/29/23  She had normal renal function on discharge   Admitted  with worsening SOB and lethargy after she had been to Methadone clinic  ABG in ER showed pH 7.15 PCO2 118 Her baseline PCO2  appears to be ~ 65 Also noted to be Hyperkalemia with K+ 6.5 meq BUN 55 mg   Treated medically overnight but K+ remains persistently elevated at 5.9 meq this AM  She had been on Olmesartan   CXR showed evidence of pul congestion Started on Lasix and has had good UOP  No report of obvious GI bleed hematomas or easy bruising           Review of Systems:  out of ICU   Breathing better   K+ improved   BP remains elevated   UOP 3500 ml/24      Physical exam:   Constitutional:  VITALS:  BP (!) 180/77   Pulse 69   Temp 97.8 °F (36.6 °C) (Oral)   Resp 18   Wt 194 lb 10.7 oz (88.3 kg)   SpO2 96%   BMI 34.48 kg/m²   Gen: anxious On O2   Skin: no rash, turgor wnl  Heent:  eomi, mmm  Neck: no bruits or jvd noted, thyroid normal  Cardiovascular:  S1, S2 without m/r/g  Respiratory: CTA B without w/r/r; respiratory effort normal  Abdomen:  +bs, soft, nt, nd, no hepatosplenomegaly  Ext: trace lower extremity edema  Musculoskeletal:  Rom, muscular strength intact; digits, nails normal    Data/  CBC:   Lab Results   Component Value Date/Time    WBC 3.1 06/14/2023 10:51 AM    RBC 6.11 06/14/2023 10:51 AM    HGB 13.7 06/14/2023 10:51 AM    HCT 51.9 06/14/2023 10:51 AM    MCV 85.0 06/14/2023 10:51 AM    MCH 22.4 06/14/2023 10:51 AM    [facility] 26.3

## 2023-06-16 NOTE — RT PROTOCOL NOTE
RT Inhaler-Nebulizer Bronchodilator Protocol Note    There is a bronchodilator order in the chart from a provider indicating to follow the RT Bronchodilator Protocol and there is an Initiate RT Inhaler-Nebulizer Bronchodilator Protocol order as well (see protocol at bottom of note). CXR Findings:  No results found. The findings from the last RT Protocol Assessment were as follows:   History Pulmonary Disease: Chronic pulmonary disease  Respiratory Pattern: Regular pattern and RR 12-20 bpm  Breath Sounds: Slightly diminished and/or crackles  Cough: Strong, spontaneous, non-productive  Indication for Bronchodilator Therapy: On home bronchodilators (Does TID  at home.)  Bronchodilator Assessment Score: 4    Aerosolized bronchodilator medication orders have been revised according to the RT Inhaler-Nebulizer Bronchodilator Protocol below. Respiratory Therapist to perform RT Therapy Protocol Assessment initially then follow the protocol. Repeat RT Therapy Protocol Assessment PRN for score 0-3 or on second treatment, BID, and PRN for scores above 3. No Indications - adjust the frequency to every 6 hours PRN wheezing or bronchospasm, if no treatments needed after 48 hours then discontinue using Per Protocol order mode. If indication present, adjust the RT bronchodilator orders based on the Bronchodilator Assessment Score as indicated below. Use Inhaler orders unless patient has one or more of the following: on home nebulizer, not able to hold breath for 10 seconds, is not alert and oriented, cannot activate and use MDI correctly, or respiratory rate 25 breaths per minute or more, then use the equivalent nebulizer order(s) with same Frequency and PRN reasons based on the score. If a patient is on this medication at home then do not decrease Frequency below that used at home.     0-3 - enter or revise RT bronchodilator order(s) to equivalent RT Bronchodilator order with Frequency of every 4 hours PRN for

## 2023-06-18 LAB
BACTERIA BLD CULT ORG #2: NORMAL
BACTERIA BLD CULT: NORMAL

## 2023-07-11 ENCOUNTER — TELEPHONE (OUTPATIENT)
Dept: WOUND CARE | Age: 53
End: 2023-07-11

## 2023-07-11 NOTE — TELEPHONE ENCOUNTER
Patient called this morning to attempt to schedule with  the [facility]. RN called patient in regards to notify patient that [facility] [address] had officially discharged her on 5/1/23. After several missed appointments and considerable amount of counseling on multiple occasions in regards to the wound care program effectiveness for weekly visits, patient consistently refused to follow these recommendations set by the provider. Supervisor RN, Dane Stephenson, spoke individually to patient on a number of occasions and gave her multiple warnings of her missed appointments. Interventions were made to accommodate to patient and any of her transportation issues that would arise. Patient continued to fail to come to her appointments. It was then decided by Wound Care Team to Discharge patient and a letter was sent out on 5/1/23. Barrett Ricardo RN did call patient and discussed this. This RN discussed that and offered to give her numbers to other facilities but patient stated it was ridiculous and hung up on RN.    Electronically signed by Sixto Brennan RN on 7/11/2023 at 12:15 PM

## 2024-04-08 ENCOUNTER — HOSPITAL ENCOUNTER (INPATIENT)
Age: 54
LOS: 4 days | Discharge: HOME | End: 2024-04-13
Attending: EMERGENCY MEDICINE | Admitting: INTERNAL MEDICINE
Payer: COMMERCIAL

## 2024-04-08 DIAGNOSIS — N30.00 ACUTE CYSTITIS WITHOUT HEMATURIA: ICD-10-CM

## 2024-04-08 DIAGNOSIS — J96.01 ACUTE HYPOXEMIC RESPIRATORY FAILURE (HCC): ICD-10-CM

## 2024-04-08 DIAGNOSIS — L03.115 CELLULITIS OF RIGHT FOOT: Primary | ICD-10-CM

## 2024-04-08 PROCEDURE — 96375 TX/PRO/DX INJ NEW DRUG ADDON: CPT

## 2024-04-08 PROCEDURE — 96365 THER/PROPH/DIAG IV INF INIT: CPT

## 2024-04-08 PROCEDURE — 96367 TX/PROPH/DG ADDL SEQ IV INF: CPT

## 2024-04-08 PROCEDURE — 99285 EMERGENCY DEPT VISIT HI MDM: CPT

## 2024-04-08 PROCEDURE — 96366 THER/PROPH/DIAG IV INF ADDON: CPT

## 2024-04-09 ENCOUNTER — APPOINTMENT (OUTPATIENT)
Dept: GENERAL RADIOLOGY | Age: 54
End: 2024-04-09
Payer: COMMERCIAL

## 2024-04-09 PROBLEM — J44.1 COPD EXACERBATION (HCC): Status: ACTIVE | Noted: 2024-04-09

## 2024-04-09 PROBLEM — J96.12 CHRONIC RESPIRATORY FAILURE WITH HYPERCAPNIA (HCC): Status: ACTIVE | Noted: 2024-04-09

## 2024-04-09 LAB
ALBUMIN SERPL-MCNC: 3.7 G/DL (ref 3.4–5)
ALBUMIN/GLOB SERPL: 0.9 {RATIO} (ref 1.1–2.2)
ALP SERPL-CCNC: 123 U/L (ref 40–129)
ALT SERPL-CCNC: 19 U/L (ref 10–40)
ANION GAP SERPL CALCULATED.3IONS-SCNC: 11 MMOL/L (ref 3–16)
AST SERPL-CCNC: 36 U/L (ref 15–37)
BACTERIA URNS QL MICRO: ABNORMAL /HPF
BASE EXCESS BLDV CALC-SCNC: 10.2 MMOL/L (ref -3–3)
BASOPHILS # BLD: 0 K/UL (ref 0–0.2)
BASOPHILS NFR BLD: 0.2 %
BILIRUB SERPL-MCNC: 0.8 MG/DL (ref 0–1)
BILIRUB UR QL STRIP.AUTO: NEGATIVE
BUN SERPL-MCNC: 20 MG/DL (ref 7–20)
CALCIUM SERPL-MCNC: 9.3 MG/DL (ref 8.3–10.6)
CHLORIDE SERPL-SCNC: 95 MMOL/L (ref 99–110)
CLARITY UR: CLEAR
CO2 BLDV-SCNC: 35 MMOL/L
CO2 SERPL-SCNC: 31 MMOL/L (ref 21–32)
COLOR UR: YELLOW
CREAT SERPL-MCNC: 0.8 MG/DL (ref 0.6–1.1)
CRP SERPL-MCNC: 4 MG/L (ref 0–5.1)
DEPRECATED RDW RBC AUTO: 15.9 % (ref 12.4–15.4)
EKG ATRIAL RATE: 98 BPM
EKG DIAGNOSIS: NORMAL
EKG P AXIS: 66 DEGREES
EKG P-R INTERVAL: 164 MS
EKG Q-T INTERVAL: 382 MS
EKG QRS DURATION: 92 MS
EKG QTC CALCULATION (BAZETT): 487 MS
EKG R AXIS: -49 DEGREES
EKG T AXIS: 51 DEGREES
EKG VENTRICULAR RATE: 98 BPM
EOSINOPHIL # BLD: 0.3 K/UL (ref 0–0.6)
EOSINOPHIL NFR BLD: 6.3 %
ERYTHROCYTE [SEDIMENTATION RATE] IN BLOOD BY WESTERGREN METHOD: 35 MM/HR (ref 0–30)
GFR SERPLBLD CREATININE-BSD FMLA CKD-EPI: 86 ML/MIN/{1.73_M2}
GLUCOSE BLD-MCNC: 287 MG/DL (ref 70–99)
GLUCOSE BLD-MCNC: 330 MG/DL (ref 70–99)
GLUCOSE BLD-MCNC: 439 MG/DL (ref 70–99)
GLUCOSE BLD-MCNC: 486 MG/DL (ref 70–99)
GLUCOSE SERPL-MCNC: 247 MG/DL (ref 70–99)
GLUCOSE UR STRIP.AUTO-MCNC: NEGATIVE MG/DL
HCO3 BLDV-SCNC: 35.6 MMOL/L (ref 23–29)
HCT VFR BLD AUTO: 55.5 % (ref 36–48)
HGB BLD-MCNC: 17.1 G/DL (ref 12–16)
HGB UR QL STRIP.AUTO: NEGATIVE
INR PPP: 1.03 (ref 0.85–1.15)
KETONES UR STRIP.AUTO-MCNC: NEGATIVE MG/DL
LACTATE BLDV-SCNC: 1.4 MMOL/L (ref 0.4–1.9)
LEUKOCYTE ESTERASE UR QL STRIP.AUTO: ABNORMAL
LYMPHOCYTES # BLD: 0.8 K/UL (ref 1–5.1)
LYMPHOCYTES NFR BLD: 15.7 %
MCH RBC QN AUTO: 25.9 PG (ref 26–34)
MCHC RBC AUTO-ENTMCNC: 30.7 G/DL (ref 31–36)
MCV RBC AUTO: 84.3 FL (ref 80–100)
MONOCYTES # BLD: 0.4 K/UL (ref 0–1.3)
MONOCYTES NFR BLD: 7.1 %
NEUTROPHILS # BLD: 3.8 K/UL (ref 1.7–7.7)
NEUTROPHILS NFR BLD: 70.7 %
NITRITE UR QL STRIP.AUTO: POSITIVE
NT-PROBNP SERPL-MCNC: 350 PG/ML (ref 0–124)
O2 THERAPY: ABNORMAL
PCO2 BLDV: 62.4 MMHG (ref 40–50)
PERFORMED ON: ABNORMAL
PH BLDV: 7.37 [PH] (ref 7.35–7.45)
PH UR STRIP.AUTO: 6 [PH] (ref 5–8)
PLATELET # BLD AUTO: 125 K/UL (ref 135–450)
PMV BLD AUTO: 7.3 FL (ref 5–10.5)
PO2 BLDV: 41 MMHG (ref 25–40)
POTASSIUM SERPL-SCNC: 5.3 MMOL/L (ref 3.5–5.1)
PROT SERPL-MCNC: 7.7 G/DL (ref 6.4–8.2)
PROT UR STRIP.AUTO-MCNC: 100 MG/DL
PROTHROMBIN TIME: 13.7 SEC (ref 11.9–14.9)
RBC # BLD AUTO: 6.58 M/UL (ref 4–5.2)
RBC #/AREA URNS HPF: ABNORMAL /HPF (ref 0–4)
SAO2 % BLDV: 72 %
SODIUM SERPL-SCNC: 137 MMOL/L (ref 136–145)
SP GR UR STRIP.AUTO: 1.02 (ref 1–1.03)
TROPONIN, HIGH SENSITIVITY: 14 NG/L (ref 0–14)
TROPONIN, HIGH SENSITIVITY: 8 NG/L (ref 0–14)
UA COMPLETE W REFLEX CULTURE PNL UR: YES
UA DIPSTICK W REFLEX MICRO PNL UR: YES
URN SPEC COLLECT METH UR: ABNORMAL
UROBILINOGEN UR STRIP-ACNC: 1 E.U./DL
WBC # BLD AUTO: 5.4 K/UL (ref 4–11)
WBC #/AREA URNS HPF: ABNORMAL /HPF (ref 0–5)

## 2024-04-09 PROCEDURE — 87040 BLOOD CULTURE FOR BACTERIA: CPT

## 2024-04-09 PROCEDURE — 87641 MR-STAPH DNA AMP PROBE: CPT

## 2024-04-09 PROCEDURE — 6370000000 HC RX 637 (ALT 250 FOR IP): Performed by: NURSE PRACTITIONER

## 2024-04-09 PROCEDURE — 94640 AIRWAY INHALATION TREATMENT: CPT

## 2024-04-09 PROCEDURE — 6360000002 HC RX W HCPCS: Performed by: INTERNAL MEDICINE

## 2024-04-09 PROCEDURE — 6370000000 HC RX 637 (ALT 250 FOR IP): Performed by: EMERGENCY MEDICINE

## 2024-04-09 PROCEDURE — 85610 PROTHROMBIN TIME: CPT

## 2024-04-09 PROCEDURE — 2700000000 HC OXYGEN THERAPY PER DAY

## 2024-04-09 PROCEDURE — 85025 COMPLETE CBC W/AUTO DIFF WBC: CPT

## 2024-04-09 PROCEDURE — 93005 ELECTROCARDIOGRAM TRACING: CPT | Performed by: EMERGENCY MEDICINE

## 2024-04-09 PROCEDURE — 6360000002 HC RX W HCPCS: Performed by: EMERGENCY MEDICINE

## 2024-04-09 PROCEDURE — 6370000000 HC RX 637 (ALT 250 FOR IP): Performed by: INTERNAL MEDICINE

## 2024-04-09 PROCEDURE — 80053 COMPREHEN METABOLIC PANEL: CPT

## 2024-04-09 PROCEDURE — 94760 N-INVAS EAR/PLS OXIMETRY 1: CPT

## 2024-04-09 PROCEDURE — 87086 URINE CULTURE/COLONY COUNT: CPT

## 2024-04-09 PROCEDURE — 83605 ASSAY OF LACTIC ACID: CPT

## 2024-04-09 PROCEDURE — 73630 X-RAY EXAM OF FOOT: CPT

## 2024-04-09 PROCEDURE — 2580000003 HC RX 258

## 2024-04-09 PROCEDURE — 86140 C-REACTIVE PROTEIN: CPT

## 2024-04-09 PROCEDURE — 1200000000 HC SEMI PRIVATE

## 2024-04-09 PROCEDURE — 96375 TX/PRO/DX INJ NEW DRUG ADDON: CPT

## 2024-04-09 PROCEDURE — 36415 COLL VENOUS BLD VENIPUNCTURE: CPT

## 2024-04-09 PROCEDURE — 85652 RBC SED RATE AUTOMATED: CPT

## 2024-04-09 PROCEDURE — 84484 ASSAY OF TROPONIN QUANT: CPT

## 2024-04-09 PROCEDURE — 99223 1ST HOSP IP/OBS HIGH 75: CPT | Performed by: INTERNAL MEDICINE

## 2024-04-09 PROCEDURE — 83036 HEMOGLOBIN GLYCOSYLATED A1C: CPT

## 2024-04-09 PROCEDURE — 71045 X-RAY EXAM CHEST 1 VIEW: CPT

## 2024-04-09 PROCEDURE — 81001 URINALYSIS AUTO W/SCOPE: CPT

## 2024-04-09 PROCEDURE — 93010 ELECTROCARDIOGRAM REPORT: CPT | Performed by: INTERNAL MEDICINE

## 2024-04-09 PROCEDURE — 2580000003 HC RX 258: Performed by: INTERNAL MEDICINE

## 2024-04-09 PROCEDURE — 96366 THER/PROPH/DIAG IV INF ADDON: CPT

## 2024-04-09 PROCEDURE — 83880 ASSAY OF NATRIURETIC PEPTIDE: CPT

## 2024-04-09 PROCEDURE — 82803 BLOOD GASES ANY COMBINATION: CPT

## 2024-04-09 PROCEDURE — 96367 TX/PROPH/DG ADDL SEQ IV INF: CPT

## 2024-04-09 PROCEDURE — 2580000003 HC RX 258: Performed by: EMERGENCY MEDICINE

## 2024-04-09 PROCEDURE — 96365 THER/PROPH/DIAG IV INF INIT: CPT

## 2024-04-09 PROCEDURE — 6370000000 HC RX 637 (ALT 250 FOR IP): Performed by: PODIATRIST

## 2024-04-09 RX ORDER — CARVEDILOL 12.5 MG/1
12.5 TABLET ORAL 2 TIMES DAILY WITH MEALS
Status: DISCONTINUED | OUTPATIENT
Start: 2024-04-09 | End: 2024-04-13 | Stop reason: HOSPADM

## 2024-04-09 RX ORDER — POLYETHYLENE GLYCOL 3350 17 G/17G
17 POWDER, FOR SOLUTION ORAL DAILY PRN
Status: DISCONTINUED | OUTPATIENT
Start: 2024-04-09 | End: 2024-04-13 | Stop reason: HOSPADM

## 2024-04-09 RX ORDER — ONDANSETRON 2 MG/ML
4 INJECTION INTRAMUSCULAR; INTRAVENOUS EVERY 6 HOURS PRN
Status: DISCONTINUED | OUTPATIENT
Start: 2024-04-09 | End: 2024-04-13 | Stop reason: HOSPADM

## 2024-04-09 RX ORDER — LEVOTHYROXINE SODIUM 0.05 MG/1
50 TABLET ORAL
Status: DISCONTINUED | OUTPATIENT
Start: 2024-04-09 | End: 2024-04-13 | Stop reason: HOSPADM

## 2024-04-09 RX ORDER — SODIUM CHLORIDE 9 MG/ML
INJECTION, SOLUTION INTRAVENOUS PRN
Status: DISCONTINUED | OUTPATIENT
Start: 2024-04-09 | End: 2024-04-13 | Stop reason: HOSPADM

## 2024-04-09 RX ORDER — HYDROCHLOROTHIAZIDE 25 MG/1
12.5 TABLET ORAL DAILY
Status: DISCONTINUED | OUTPATIENT
Start: 2024-04-09 | End: 2024-04-13 | Stop reason: HOSPADM

## 2024-04-09 RX ORDER — INSULIN GLARGINE 100 [IU]/ML
20 INJECTION, SOLUTION SUBCUTANEOUS NIGHTLY
Status: DISCONTINUED | OUTPATIENT
Start: 2024-04-09 | End: 2024-04-10

## 2024-04-09 RX ORDER — DEXTROSE MONOHYDRATE 100 MG/ML
INJECTION, SOLUTION INTRAVENOUS CONTINUOUS PRN
Status: DISCONTINUED | OUTPATIENT
Start: 2024-04-09 | End: 2024-04-13 | Stop reason: HOSPADM

## 2024-04-09 RX ORDER — IPRATROPIUM BROMIDE AND ALBUTEROL SULFATE 2.5; .5 MG/3ML; MG/3ML
1 SOLUTION RESPIRATORY (INHALATION) EVERY 6 HOURS PRN
COMMUNITY
Start: 2011-08-31

## 2024-04-09 RX ORDER — INSULIN LISPRO 100 [IU]/ML
10 INJECTION, SOLUTION INTRAVENOUS; SUBCUTANEOUS
Status: DISCONTINUED | OUTPATIENT
Start: 2024-04-09 | End: 2024-04-10

## 2024-04-09 RX ORDER — METHYLPREDNISOLONE SODIUM SUCCINATE 40 MG/ML
40 INJECTION, POWDER, LYOPHILIZED, FOR SOLUTION INTRAMUSCULAR; INTRAVENOUS EVERY 6 HOURS
Status: DISCONTINUED | OUTPATIENT
Start: 2024-04-09 | End: 2024-04-09

## 2024-04-09 RX ORDER — INSULIN LISPRO 100 [IU]/ML
0-4 INJECTION, SOLUTION INTRAVENOUS; SUBCUTANEOUS NIGHTLY
Status: DISCONTINUED | OUTPATIENT
Start: 2024-04-09 | End: 2024-04-13 | Stop reason: HOSPADM

## 2024-04-09 RX ORDER — IPRATROPIUM BROMIDE AND ALBUTEROL SULFATE 2.5; .5 MG/3ML; MG/3ML
1 SOLUTION RESPIRATORY (INHALATION) ONCE
Status: COMPLETED | OUTPATIENT
Start: 2024-04-09 | End: 2024-04-09

## 2024-04-09 RX ORDER — ACETAMINOPHEN 500 MG
1000 TABLET ORAL ONCE
Status: COMPLETED | OUTPATIENT
Start: 2024-04-09 | End: 2024-04-09

## 2024-04-09 RX ORDER — METHYLPREDNISOLONE SODIUM SUCCINATE 40 MG/ML
40 INJECTION, POWDER, LYOPHILIZED, FOR SOLUTION INTRAMUSCULAR; INTRAVENOUS DAILY
Status: COMPLETED | OUTPATIENT
Start: 2024-04-10 | End: 2024-04-10

## 2024-04-09 RX ORDER — INSULIN LISPRO 100 [IU]/ML
5 INJECTION, SOLUTION INTRAVENOUS; SUBCUTANEOUS
Status: DISCONTINUED | OUTPATIENT
Start: 2024-04-09 | End: 2024-04-09

## 2024-04-09 RX ORDER — INSULIN LISPRO 100 [IU]/ML
3 INJECTION, SOLUTION INTRAVENOUS; SUBCUTANEOUS ONCE
Status: COMPLETED | OUTPATIENT
Start: 2024-04-09 | End: 2024-04-09

## 2024-04-09 RX ORDER — METHYLPREDNISOLONE SODIUM SUCCINATE 125 MG/2ML
125 INJECTION, POWDER, LYOPHILIZED, FOR SOLUTION INTRAMUSCULAR; INTRAVENOUS ONCE
Status: COMPLETED | OUTPATIENT
Start: 2024-04-09 | End: 2024-04-09

## 2024-04-09 RX ORDER — PREDNISONE 20 MG/1
40 TABLET ORAL DAILY
Status: COMPLETED | OUTPATIENT
Start: 2024-04-11 | End: 2024-04-13

## 2024-04-09 RX ORDER — VANCOMYCIN 1.75 G/350ML
1250 INJECTION, SOLUTION INTRAVENOUS EVERY 12 HOURS
Status: DISCONTINUED | OUTPATIENT
Start: 2024-04-09 | End: 2024-04-10 | Stop reason: DRUGHIGH

## 2024-04-09 RX ORDER — INSULIN LISPRO 100 [IU]/ML
0-8 INJECTION, SOLUTION INTRAVENOUS; SUBCUTANEOUS
Status: DISCONTINUED | OUTPATIENT
Start: 2024-04-09 | End: 2024-04-13 | Stop reason: HOSPADM

## 2024-04-09 RX ORDER — SODIUM CHLORIDE 0.9 % (FLUSH) 0.9 %
5-40 SYRINGE (ML) INJECTION PRN
Status: DISCONTINUED | OUTPATIENT
Start: 2024-04-09 | End: 2024-04-13 | Stop reason: HOSPADM

## 2024-04-09 RX ORDER — PREDNISONE 20 MG/1
40 TABLET ORAL DAILY
Status: DISCONTINUED | OUTPATIENT
Start: 2024-04-11 | End: 2024-04-09

## 2024-04-09 RX ORDER — ATORVASTATIN CALCIUM 40 MG/1
40 TABLET, FILM COATED ORAL NIGHTLY
Status: DISCONTINUED | OUTPATIENT
Start: 2024-04-09 | End: 2024-04-13 | Stop reason: HOSPADM

## 2024-04-09 RX ORDER — LOSARTAN POTASSIUM AND HYDROCHLOROTHIAZIDE 12.5; 5 MG/1; MG/1
1 TABLET ORAL DAILY
COMMUNITY

## 2024-04-09 RX ORDER — IPRATROPIUM BROMIDE AND ALBUTEROL SULFATE 2.5; .5 MG/3ML; MG/3ML
1 SOLUTION RESPIRATORY (INHALATION)
Status: DISCONTINUED | OUTPATIENT
Start: 2024-04-09 | End: 2024-04-13 | Stop reason: HOSPADM

## 2024-04-09 RX ORDER — ACETAMINOPHEN 650 MG/1
650 SUPPOSITORY RECTAL EVERY 6 HOURS PRN
Status: DISCONTINUED | OUTPATIENT
Start: 2024-04-09 | End: 2024-04-13 | Stop reason: HOSPADM

## 2024-04-09 RX ORDER — ONDANSETRON 4 MG/1
4 TABLET, ORALLY DISINTEGRATING ORAL EVERY 8 HOURS PRN
Status: DISCONTINUED | OUTPATIENT
Start: 2024-04-09 | End: 2024-04-13 | Stop reason: HOSPADM

## 2024-04-09 RX ORDER — ACETAMINOPHEN 325 MG/1
650 TABLET ORAL EVERY 6 HOURS PRN
Status: DISCONTINUED | OUTPATIENT
Start: 2024-04-09 | End: 2024-04-13 | Stop reason: HOSPADM

## 2024-04-09 RX ORDER — LOSARTAN POTASSIUM AND HYDROCHLOROTHIAZIDE 12.5; 5 MG/1; MG/1
1 TABLET ORAL DAILY
Status: DISCONTINUED | OUTPATIENT
Start: 2024-04-09 | End: 2024-04-09

## 2024-04-09 RX ORDER — LOSARTAN POTASSIUM 50 MG/1
50 TABLET ORAL DAILY
Status: DISCONTINUED | OUTPATIENT
Start: 2024-04-09 | End: 2024-04-13 | Stop reason: HOSPADM

## 2024-04-09 RX ORDER — SODIUM CHLORIDE 0.9 % (FLUSH) 0.9 %
5-40 SYRINGE (ML) INJECTION EVERY 12 HOURS SCHEDULED
Status: DISCONTINUED | OUTPATIENT
Start: 2024-04-09 | End: 2024-04-13 | Stop reason: HOSPADM

## 2024-04-09 RX ORDER — WATER 10 ML/10ML
INJECTION INTRAMUSCULAR; INTRAVENOUS; SUBCUTANEOUS
Status: COMPLETED
Start: 2024-04-09 | End: 2024-04-09

## 2024-04-09 RX ORDER — OXYCODONE HYDROCHLORIDE AND ACETAMINOPHEN 5; 325 MG/1; MG/1
1 TABLET ORAL EVERY 6 HOURS PRN
Status: DISCONTINUED | OUTPATIENT
Start: 2024-04-09 | End: 2024-04-13 | Stop reason: HOSPADM

## 2024-04-09 RX ORDER — ENOXAPARIN SODIUM 100 MG/ML
40 INJECTION SUBCUTANEOUS DAILY
Status: DISCONTINUED | OUTPATIENT
Start: 2024-04-09 | End: 2024-04-13 | Stop reason: HOSPADM

## 2024-04-09 RX ORDER — CETIRIZINE HYDROCHLORIDE 10 MG/1
5 TABLET ORAL DAILY
Status: DISCONTINUED | OUTPATIENT
Start: 2024-04-09 | End: 2024-04-13 | Stop reason: HOSPADM

## 2024-04-09 RX ADMIN — INSULIN LISPRO 8 UNITS: 100 INJECTION, SOLUTION INTRAVENOUS; SUBCUTANEOUS at 09:58

## 2024-04-09 RX ADMIN — ATORVASTATIN CALCIUM 40 MG: 40 TABLET, FILM COATED ORAL at 21:03

## 2024-04-09 RX ADMIN — CEFEPIME 2000 MG: 2 INJECTION, POWDER, FOR SOLUTION INTRAVENOUS at 12:07

## 2024-04-09 RX ADMIN — MOMETASONE FUROATE AND FORMOTEROL FUMARATE DIHYDRATE 2 PUFF: 200; 5 AEROSOL RESPIRATORY (INHALATION) at 20:24

## 2024-04-09 RX ADMIN — INSULIN GLARGINE 20 UNITS: 100 INJECTION, SOLUTION SUBCUTANEOUS at 21:03

## 2024-04-09 RX ADMIN — INSULIN LISPRO 10 UNITS: 100 INJECTION, SOLUTION INTRAVENOUS; SUBCUTANEOUS at 16:53

## 2024-04-09 RX ADMIN — LOSARTAN POTASSIUM 50 MG: 50 TABLET, FILM COATED ORAL at 14:05

## 2024-04-09 RX ADMIN — HYDROCHLOROTHIAZIDE 12.5 MG: 25 TABLET ORAL at 14:05

## 2024-04-09 RX ADMIN — IPRATROPIUM BROMIDE AND ALBUTEROL SULFATE 1 DOSE: 2.5; .5 SOLUTION RESPIRATORY (INHALATION) at 16:36

## 2024-04-09 RX ADMIN — CARVEDILOL 12.5 MG: 12.5 TABLET, FILM COATED ORAL at 16:53

## 2024-04-09 RX ADMIN — CEFEPIME 2000 MG: 2 INJECTION, POWDER, FOR SOLUTION INTRAVENOUS at 23:06

## 2024-04-09 RX ADMIN — IPRATROPIUM BROMIDE AND ALBUTEROL SULFATE 1 DOSE: .5; 3 SOLUTION RESPIRATORY (INHALATION) at 00:11

## 2024-04-09 RX ADMIN — CARVEDILOL 12.5 MG: 12.5 TABLET, FILM COATED ORAL at 09:57

## 2024-04-09 RX ADMIN — METHYLPREDNISOLONE SODIUM SUCCINATE 40 MG: 40 INJECTION INTRAMUSCULAR; INTRAVENOUS at 09:57

## 2024-04-09 RX ADMIN — CEFEPIME 2000 MG: 2 INJECTION, POWDER, FOR SOLUTION INTRAVENOUS at 00:30

## 2024-04-09 RX ADMIN — ENOXAPARIN SODIUM 40 MG: 100 INJECTION SUBCUTANEOUS at 09:58

## 2024-04-09 RX ADMIN — CETIRIZINE HYDROCHLORIDE 5 MG: 10 TABLET, FILM COATED ORAL at 14:05

## 2024-04-09 RX ADMIN — VANCOMYCIN HYDROCHLORIDE 2000 MG: 1 INJECTION, POWDER, LYOPHILIZED, FOR SOLUTION INTRAVENOUS at 01:09

## 2024-04-09 RX ADMIN — SODIUM CHLORIDE, PRESERVATIVE FREE 10 ML: 5 INJECTION INTRAVENOUS at 21:05

## 2024-04-09 RX ADMIN — INSULIN LISPRO 10 UNITS: 100 INJECTION, SOLUTION INTRAVENOUS; SUBCUTANEOUS at 12:07

## 2024-04-09 RX ADMIN — INSULIN LISPRO 8 UNITS: 100 INJECTION, SOLUTION INTRAVENOUS; SUBCUTANEOUS at 12:07

## 2024-04-09 RX ADMIN — INSULIN LISPRO 5 UNITS: 100 INJECTION, SOLUTION INTRAVENOUS; SUBCUTANEOUS at 09:58

## 2024-04-09 RX ADMIN — METHYLPREDNISOLONE SODIUM SUCCINATE 125 MG: 125 INJECTION INTRAMUSCULAR; INTRAVENOUS at 00:12

## 2024-04-09 RX ADMIN — INSULIN LISPRO 6 UNITS: 100 INJECTION, SOLUTION INTRAVENOUS; SUBCUTANEOUS at 16:53

## 2024-04-09 RX ADMIN — LEVOTHYROXINE SODIUM 50 MCG: 0.05 TABLET ORAL at 09:57

## 2024-04-09 RX ADMIN — OXYCODONE AND ACETAMINOPHEN 1 TABLET: 5; 325 TABLET ORAL at 23:06

## 2024-04-09 RX ADMIN — VANCOMYCIN 1250 MG: 1.75 INJECTION, SOLUTION INTRAVENOUS at 16:18

## 2024-04-09 RX ADMIN — Medication: at 14:05

## 2024-04-09 RX ADMIN — SODIUM CHLORIDE, PRESERVATIVE FREE 10 ML: 5 INJECTION INTRAVENOUS at 09:58

## 2024-04-09 RX ADMIN — WATER 10 ML: 1 INJECTION INTRAMUSCULAR; INTRAVENOUS; SUBCUTANEOUS at 01:10

## 2024-04-09 RX ADMIN — ACETAMINOPHEN 1000 MG: 500 TABLET ORAL at 05:16

## 2024-04-09 RX ADMIN — IPRATROPIUM BROMIDE AND ALBUTEROL SULFATE 1 DOSE: 2.5; .5 SOLUTION RESPIRATORY (INHALATION) at 20:24

## 2024-04-09 RX ADMIN — INSULIN LISPRO 3 UNITS: 100 INJECTION, SOLUTION INTRAVENOUS; SUBCUTANEOUS at 10:30

## 2024-04-09 ASSESSMENT — PAIN DESCRIPTION - PAIN TYPE
TYPE: CHRONIC PAIN
TYPE: ACUTE PAIN
TYPE: CHRONIC PAIN

## 2024-04-09 ASSESSMENT — PAIN DESCRIPTION - LOCATION
LOCATION: KNEE
LOCATION: KNEE
LOCATION: LEG
LOCATION: KNEE

## 2024-04-09 ASSESSMENT — PAIN DESCRIPTION - DESCRIPTORS
DESCRIPTORS: ACHING;DISCOMFORT
DESCRIPTORS: DULL
DESCRIPTORS: DISCOMFORT;THROBBING

## 2024-04-09 ASSESSMENT — PAIN SCALES - GENERAL
PAINLEVEL_OUTOF10: 5
PAINLEVEL_OUTOF10: 9
PAINLEVEL_OUTOF10: 7
PAINLEVEL_OUTOF10: 8
PAINLEVEL_OUTOF10: 4

## 2024-04-09 ASSESSMENT — PAIN DESCRIPTION - ORIENTATION
ORIENTATION: RIGHT;LEFT
ORIENTATION: RIGHT;LEFT
ORIENTATION: RIGHT

## 2024-04-09 ASSESSMENT — PAIN DESCRIPTION - FREQUENCY
FREQUENCY: CONTINUOUS

## 2024-04-09 ASSESSMENT — PAIN - FUNCTIONAL ASSESSMENT
PAIN_FUNCTIONAL_ASSESSMENT: 0-10
PAIN_FUNCTIONAL_ASSESSMENT: PREVENTS OR INTERFERES SOME ACTIVE ACTIVITIES AND ADLS
PAIN_FUNCTIONAL_ASSESSMENT: 0-10

## 2024-04-09 ASSESSMENT — PAIN DESCRIPTION - ONSET: ONSET: ON-GOING

## 2024-04-09 NOTE — ED PROVIDER NOTES
[facility] Emergency Department - [facility], Randall Templeton MD, am the primary clinician of record.    CHIEF COMPLAINT  Chief Complaint   Patient presents with    Wound Infection     PT presented to ED C/O wound infection on the right big toe started couple days ago was notes clearly yesterday, pt denies any fever, pt reported pain as 5/10 when she step on the floor, pt denies SOB with normal skin color and didn't used accessory muscle, On exam SPO2 was 50%. Pt has a HX of hypoxia , she used to be on O2 on 3L but she was off O2   for over 1 year .            HISTORY OF PRESENT ILLNESS  Lacey Champagne is a 54 y.o. female  who presents to the ED complaining of coming in for evaluation of a wound to the right toe that she just noticed very recently in the past day or 2.  She reports that her right lower extremity is also red swollen and warm to touch mainly on the right foot.  She denies any specific injury that she can recall that would have triggered this wound to occur but does struggle with wounds in the past.    She has a history of COPD, chronic wound ulcers to both legs, CAD, diabetes and hypertension.    She is no longer oxygen dependent from her COPD.  Of note, although not really complaining of shortness of breath or coughing or wheezing or any acute respiratory symptoms, she is notably hypoxic in the 50-60% range on room air when initially evaluated although tolerating it quite well without significant tachypnea or distress.  She was placed on nasal cannula oxygen with improvement of oxygenation.  She has had no chest pain.  She tells me that she is only here for her toe and not really here for breathing and is surprised to find that she is still hypoxic although this was reproduced on multiple fingers as well as a forehead monitor with good waveforms.  She denies abdominal pain but also says \"I think I might have a UTI\" as well from dysuria.    No other complaints, modifying factors or  another consideration.  Patient is initially started on broad-spectrum antibiotics with vancomycin and cefepime.  This should cover for any skin/bone, but also potentially urinary or pulmonary infectious processes.  Blood cultures were obtained.  Chest x-ray shows likely underlying COPD, but no infiltrate, possible mild edema but no rales on exam.  Right foot x-ray shows no fracture but osteo may not show up on early XR.  Blood work demonstrates hyperglycemia but no gap or signs of DKA, no leukocytosis and stable hemoglobin 17.1, BNP mildly elevated at 350 and troponin is negative.  Lactate is normal.  She is not septic or toxic appearing but given the extensive nature of her infection in the setting of diabetes I am concerned it may progress potentially quickly and also osteomyelitis is not off of the differential given the her sed rate is elevated.  Urinalysis does also show UTI.    Although the patient is not acutely short of breath in any capacity she did have some initial perioral cyanosis and notable hypoxia.  She has previously been oxygen dependent on COPD but not currently anymore.  Given her lack of acute respiratory symptomatology this is likely a chronic tolerated hypoxia from the patient but nonetheless significant and so the patient was placed on nasal cannula oxygen as well as given IV Solu-Medrol and a DuoNeb breathing treatment with some improvement.  Her improvement argues against need for BiPAP or any additional supportive respiratory measures at this time which I did consider.    Based on the above, she will be admitted to the hospital for further treatment of her hypoxic respiratory failure as well as her infected right foot with comorbidities worrisome for a more complicated course making outpatient treatment unlikely to be successful.      Is this patient to be included in the SEP-1 Core Measure?  No   Exclusion criteria - the patient is NOT to be included for SEP-1 Core Measure due to:  2+

## 2024-04-09 NOTE — PLAN OF CARE
4 Eyes Skin Assessment     NAME:  Lacey Champagne  YOB: 1970  MEDICAL RECORD NUMBER:  [id number]    The patient is being assessed for  Admission    I agree that at least one RN has performed a thorough Head to Toe Skin Assessment on the patient. ALL assessment sites listed below have been assessed.      Areas assessed by both nurses:    Head, Face, Ears, Shoulders, Back, Chest, Arms, Elbows, Hands, Sacrum. Buttock, Coccyx, Ischium, Legs. Feet and Heels, and Under Medical Devices         Does the Patient have a Wound? Yes wound(s) were present on assessment. LDA wound assessment was Initiated and completed by RN       Jose Prevention initiated by RN: No  Wound Care Orders initiated by RN: Yes    Pressure Injury (Stage 3,4, Unstageable, DTI, NWPT, and Complex wounds) if present, place Wound referral order by RN under : No    New Ostomies, if present place, Ostomy referral order under : No     Nurse 1 eSignature: Electronically signed by Addis Barcenas RN on 4/9/24 at 10:02 AM EDT    **SHARE this note so that the co-signing nurse can place an eSignature**    Nurse 2 eSignature: Electronically signed by Qiana Sinha RN on 4/9/24 at 10:43 AM EDT

## 2024-04-09 NOTE — H&P
Hospital Medicine History & Physical      PCP: Direct, [facility]    Date of Admission: 4/8/2024    Date of Service: Pt seen/examined on 04/09/2024 and Admitted to Inpatient with expected LOS greater than two midnights due to medical therapy.     Chief Complaint: Toe edema erythema      History Of Present Illness:      54 y.o. female who presented to [facility] with toe edema and erythema, for the last 2 days, with minimal discomfort without pain no fever or chills denies recent trauma, started to have redness and edema, on presentation was severely hypoxic, patient did not have any complain of shortness of breath chest pain she stated a month ago had upper respiratory infection and afterward she started to have some cough but not significantly worse, started on oxygen and steroids in ED.  At this time denies any nausea vomiting or abdominal pain.    Past Medical History:          Diagnosis Date    Anemia     Asthma     Cerebral artery occlusion with cerebral infarction (HCC)     CHF (congestive heart failure) ([facility])     Chronic venous hypertension (idiopathic) with ulcer of bilateral lower extremity ([facility]) 4/24/2023    Has wounds bilateral lower legs    COPD (chronic obstructive pulmonary disease) ([facility])     Diabetes ([facility])     Edema     Edema     Foot ulcer ([facility])     HTN (hypertension), benign 11/19/2013    Hyperlipidemia     PAD (peripheral artery disease) ([facility])     Pressure ulcer of right buttock, stage 3 ([facility]) 11/22/2022    Thyroid disease        Past Surgical History:          Procedure Laterality Date    HERNIA REPAIR      TUBAL LIGATION      VASCULAR SURGERY Left     left arterial stent       Medications Prior to Admission:      Prior to Admission medications    Medication Sig Start Date End Date Taking? Authorizing Provider   losartan-hydroCHLOROthiazide (HYZAAR) 50-12.5 MG per tablet Take 1 tablet by mouth daily   Yes Provider, MD Jackson   tiotropium-olodaterol (STIOLTO) 2.5-2.5 MCG/ACT AERS Inhale 1  negative.    PHYSICAL EXAM PERFORMED:    BP (!) 171/73   Pulse 81   Temp 98.4 °F (36.9 °C) (Oral)   Resp 19   Ht 1.6 m (5' 3\")   Wt 98 kg (216 lb 0.8 oz)   SpO2 95%   BMI 38.27 kg/m²     General appearance:  No apparent distress  HEENT:  Normal cephalic  Respiratory: Diminished breath sounds  Cardiovascular:  Regular rate and rhythm with normal S1/S2 without murmurs, rubs or gallops.  Abdomen: Soft, non-tender  Musculoskeletal: Edema erythema drainage of right big toe  Neurologic: No focal weakness  Psychiatric:  Alert and oriented  Capillary Refill: Brisk,3 seconds, normal  Peripheral Pulses: +2 palpable, equal bilaterally       Labs:     Recent Labs     04/09/24  0035   WBC 5.4   HGB 17.1*   HCT 55.5*   *     Recent Labs     04/09/24 0035      K 5.3*   CL 95*   CO2 31   BUN 20   CREATININE 0.8   CALCIUM 9.3     Recent Labs     04/09/24 0035   AST 36   ALT 19   BILITOT 0.8   ALKPHOS 123     Recent Labs     04/09/24  0035   INR 1.03     Recent Labs     04/09/24  0035 04/09/24  0218   TROPHS 14 8       Urinalysis:      Lab Results   Component Value Date/Time    NITRU POSITIVE 04/09/2024 02:18 AM    WBCUA 21-50 04/09/2024 02:18 AM    BACTERIA 3+ 04/09/2024 02:18 AM    RBCUA None seen 04/09/2024 02:18 AM    BLOODU Negative 04/09/2024 02:18 AM    SPECGRAV 1.025 04/09/2024 02:18 AM    GLUCOSEU Negative 04/09/2024 02:18 AM    GLUCOSEU 250 06/24/2010 04:50 AM       Radiology:     CXR: I have reviewed the CXR with the following interpretation: No infiltrate  EKG:  I have reviewed the EKG with the following interpretation: No ST elevation    XR FOOT RIGHT (MIN 3 VIEWS)   Final Result   No acute osseous abnormality.         XR CHEST PORTABLE   Final Result   Mildly prominent interstitial change, possibly related to underlying COPD or   mild edema.             Consults:    PHARMACY TO DOSE VANCOMYCIN  IP CONSULT TO SPIRITUAL SERVICES  IP CONSULT TO PODIATRY  IP CONSULT TO

## 2024-04-09 NOTE — CONSULTS
Department of Podiatry Consult Note  John Adamosn DPM Attending       Reason for Consult:  RIGHT great toe wound with infection  Requesting Physician:  Familia August    CHIEF COMPLAINT:  INfection of RIGHT great toe, previous LEFT 5th toe amputation    HISTORY OF PRESENT ILLNESS:                The patient is a 54 y.o. female with significant past medical history of Diabetes with neuropathy and previous amputation of toe, CHF with COPD and PAD who is consulted for three day history of increasing erythema to RIGHT great toe. She is concerned as she lost LEFT 5th toe in similar circumstance    Past Medical History:        Diagnosis Date    Anemia     Asthma     Cerebral artery occlusion with cerebral infarction (HCC)     CHF (congestive heart failure) ([facility])     Chronic venous hypertension (idiopathic) with ulcer of bilateral lower extremity ([facility]) 4/24/2023    Has wounds bilateral lower legs    COPD (chronic obstructive pulmonary disease) ([facility])     Diabetes (HCC)     Edema     Edema     Foot ulcer ([facility])     HTN (hypertension), benign 11/19/2013    Hyperlipidemia     PAD (peripheral artery disease) ([facility])     Pressure ulcer of right buttock, stage 3 ([facility]) 11/22/2022    Thyroid disease      Past Surgical History:        Procedure Laterality Date    HERNIA REPAIR      TUBAL LIGATION      VASCULAR SURGERY Left     left arterial stent     Current Medications:    Current Facility-Administered Medications: sodium chloride flush 0.9 % injection 5-40 mL, 5-40 mL, IntraVENous, 2 times per day  sodium chloride flush 0.9 % injection 5-40 mL, 5-40 mL, IntraVENous, PRN  0.9 % sodium chloride infusion, , IntraVENous, PRN  ondansetron (ZOFRAN-ODT) disintegrating tablet 4 mg, 4 mg, Oral, Q8H PRN **OR** ondansetron (ZOFRAN) injection 4 mg, 4 mg, IntraVENous, Q6H PRN  polyethylene glycol (GLYCOLAX) packet 17 g, 17 g, Oral, Daily PRN  enoxaparin (LOVENOX) injection 40 mg, 40 mg, SubCUTAneous, Daily  acetaminophen (TYLENOL) tablet 650  Relation Age of Onset    COPD Mother     Early Death Brother     Cancer Maternal Aunt     Cancer Maternal Grandmother      REVIEW OF SYSTEMS:    CONSTITUTIONAL:  negative  INTEGUMENT/BREAST:  positive for skin lesion(s), dryness, and changes in nails  ENDOCRINE:  positive for diabetic symptoms including neither foot ulcerations nor neuropathy  MUSCULOSKELETAL:  positive for  joint swelling and decreased range of motion  NEUROLOGICAL:  positive for gait problems and numbness  PHYSICAL EXAM:      Vitals:    BP (!) 169/80   Pulse 79   Temp 98 °F (36.7 °C) (Oral)   Resp 17   Ht 1.6 m (5' 3\")   Wt 98 kg (216 lb 0.8 oz)   SpO2 90%   BMI 38.27 kg/m²     LABS:   Recent Labs     04/09/24 0035   WBC 5.4   HGB 17.1*   HCT 55.5*   *     Recent Labs     04/09/24 0035      K 5.3*   CL 95*   CO2 31   BUN 20   CREATININE 0.8     Recent Labs     04/09/24 0035   PROT 7.7   INR 1.03       LOWER EXTREMITY EXAMINATION   SKIN:    RIGHT hallux with increased size of digit from nail to metatarsal phalangeal joint due to edema and light pink erythema without odor or drainage. Light eschar to medial great toe with yellow crusted eschar of 0.7 x 0.4 cm, uncertain depth with celena-wound erythema    LEFT foot normal with absent 5th toe    NEUROLOGIC:    Pain sensation isnot significantly changed Bilateral.  Light touch is decreasedBilateral. positive history of paresthesia Bilateral. negativehistory of burning Bilateral. Deep tendon reflexes are 2 to include patellar and achilles Bilateral. [place] 5.07 monofilament sensitivity is abnormal 3 to 5 spots tested Bilateral.    VASCULAR:    bilaterally Dorsalis pedis is 1. bilaterally Posterior tibial pulse is 1. 1+ edema right. moderate Varicosities bilaterally.      MUSCULOSKELETAL:  [place] is  untested due to wounds . Muscle strength is 5/5 to all groups tested to include dorsiflexion, plantarflexion, inversion, eversion, and digital Bilateral . The ranges of motion

## 2024-04-09 NOTE — ACP (ADVANCE CARE PLANNING)
Advance Care Planning     Advance Care Planning Inpatient Note  [facility] Department    Today's Date: 4/9/2024  Unit: [facility] ORTHOPEDICS    Received request from HealthCare Provider.  Upon review of chart and communication with care team, patient's decision making abilities are not in question.. Patient was/were present in the room during visit.    Goals of ACP Conversation:  Discuss advance care planning documents    Health Care Decision Makers:       Primary Decision Maker: MiguelDavid - Child - [phone]    Primary Decision Maker: Courtney Vasquez - Child - [phone]    Primary Decision Maker: zenaida vasquez - Child - [phone]  Summary:  No Decision Maker named by patient at this time    Advance Care Planning Documents (Patient Wishes):  None     Assessment:  Pt sated that her next of kin are her 6 children. Pt would like to look over the AD docs before completing them.     Interventions:  Provided education on documents for clarity and greater understanding  Discussed and provided education on state decision maker hierarchy  Encouraged ongoing ACP conversation with future decision makers and loved ones  Reviewed but did not complete ACP document    Care Preferences Communicated:   No    Outcomes/Plan:  ACP Discussion: Postponed  Pt to follow-up    Electronically signed by Chaplain Loyd on 4/9/2024 at 4:06 PM

## 2024-04-09 NOTE — ED NOTES
Pt mom called would like to know the results of pt COVID test    Please advise what to tell pt mom    Strategic here report given pt left with multiple bags of belongings on the stretcher .

## 2024-04-09 NOTE — CARE COORDINATION
Case Management Assessment  Initial Evaluation    Date/Time of Evaluation: 4/9/2024 4:02 PM  Assessment Completed by: SANDY Perry    If patient is discharged prior to next notation, then this note serves as note for discharge by case management.    Patient Name: Lacey Vasquez                   YOB: 1970  Diagnosis: COPD exacerbation (HCC) [J44.1]  Cellulitis of right foot [L03.115]  Acute cystitis without hematuria [N30.00]  Acute hypoxemic respiratory failure (HCC) [J96.01]                   Date / Time: 4/8/2024 11:53 PM    Patient Admission Status: Inpatient   Readmission Risk (Low < 19, Mod (19-27), High > 27): Readmission Risk Score: 11.1    Current PCP: [facility], [facility]  PCP verified by CM? Yes    Chart Reviewed: Yes      History Provided by: Patient, Medical Record  Patient Orientation: Alert and Oriented    Patient Cognition: Alert    Hospitalization in the last 30 days (Readmission):  No    If yes, Readmission Assessment in CM Navigator will be completed.    Advance Directives:      Code Status: Full Code   Patient's Primary Decision Maker is: Legal Next of Kin    Primary Decision Maker: David Rivera - Child - [phone]    Primary Decision Maker: Courtney Vasquez - Child - [phone]    Primary Decision Maker: lacey vasquez - Child - [phone]    Discharge Planning:    Patient lives with: Alone Type of Home: Apartment  Primary Care Giver: Self  Patient Support Systems include: Children, Family Members   Current Financial resources: Medicaid  Current community resources: None  Current services prior to admission: Durable Medical Equipment            Current DME: Shower Chair            Type of Home Care services:  None    ADLS  Prior functional level: Independent in ADLs/IADLs  Current functional level: Independent in ADLs/IADLs    PT AM-PAC:   /24  OT AM-PAC:   /24    Family can provide assistance at DC: No  Would you like Case Management to discuss the discharge

## 2024-04-09 NOTE — CONSULTS
PATIENT IS SEEN AT THE REQUEST OF DR. August for resp failure    HISTORY OF PRESENT ILLNESS:  This is a 54 y.o. female who presented to the ED on 4/8 with a CC of would infection.  Per ED notes right toe showed signs of infection.  She was noted to be hypoxia during evaluation.  .  She reports a prior history of being on oxygen but was weaned off oxygen when she was weaned off methadone.  Has history of COPD but stopped smoking about 7 years ago.  Recently had sinus infection that may have went into her chest.  This then led to lower oxygen readings lately.  She did cough up some brown mucus and is feeling less SOB today    Established Pulmonologist:  None    PAST MEDICAL HISTORY:  Past Medical History:   Diagnosis Date    Anemia     Asthma     Cerebral artery occlusion with cerebral infarction ([facility])     CHF (congestive heart failure) ([facility])     Chronic venous hypertension (idiopathic) with ulcer of bilateral lower extremity ([facility]) 4/24/2023    Has wounds bilateral lower legs    COPD (chronic obstructive pulmonary disease) ([facility])     Diabetes ([facility])     Edema     Edema     Foot ulcer ([facility])     HTN (hypertension), benign 11/19/2013    Hyperlipidemia     PAD (peripheral artery disease) ([facility])     Pressure ulcer of right buttock, stage 3 (HCC) 11/22/2022    Thyroid disease        PAST SURGICAL HISTORY:  Past Surgical History:   Procedure Laterality Date    HERNIA REPAIR      TUBAL LIGATION      VASCULAR SURGERY Left     left arterial stent       FAMILY HISTORY:  family history includes COPD in her mother; Cancer in her maternal aunt and maternal grandmother; Early Death in her brother.    SOCIAL HISTORY:   reports that she quit smoking about 7 years ago. Her smoking use included cigarettes. She started smoking about 32 years ago. She has a 25.0 pack-year smoking history. She has never used smokeless tobacco.    Scheduled Meds:   sodium chloride flush  5-40 mL IntraVENous 2 times per day    enoxaparin  40 mg SubCUTAneous

## 2024-04-09 NOTE — CONSULTS
Clinical Pharmacy Note  Vancomycin Consult    Lacey Champagne is a 54 y.o. female ordered vancomycin for HAP; consult received from Dr. Kraus to manage therapy. Also receiving cefepime.    Allergies:  Doxycycline, Ketorolac tromethamine, Morphine and related, Codeine, Pcn [penicillins], and Ciprofloxacin     Temp max:  Temp (24hrs), Av.2 °F (36.8 °C), Min:98 °F (36.7 °C), Max:98.4 °F (36.9 °C)      Recent Labs     24  0035   WBC 5.4       Recent Labs     24  0035   BUN 20   CREATININE 0.8       No intake or output data in the 24 hours ending 24 1100    Culture Results:  pending    Ht Readings from Last 1 Encounters:   24 1.6 m (5' 3\")        Wt Readings from Last 1 Encounters:   24 98 kg (216 lb 0.8 oz)         Estimated Creatinine Clearance: 88 mL/min (based on SCr of 0.8 mg/dL).    Assessment/Plan:  Day # 1 of vancomycin.  Vancomycin 2000 mg x 1 given this morning in the ER.  Start vancomycin 1250 mg IV every 12 hours this afternoon  Goal -600  Predicted   Check vancomycin random level tomorrow with AM labs.    Thank you for the consult.      Alisson Najera, PharmD  2024 11:00 AM

## 2024-04-10 LAB
ANION GAP SERPL CALCULATED.3IONS-SCNC: 9 MMOL/L (ref 3–16)
BACTERIA UR CULT: ABNORMAL
BASOPHILS # BLD: 0 K/UL (ref 0–0.2)
BASOPHILS NFR BLD: 0 %
BUN SERPL-MCNC: 36 MG/DL (ref 7–20)
CALCIUM SERPL-MCNC: 8.7 MG/DL (ref 8.3–10.6)
CHLORIDE SERPL-SCNC: 97 MMOL/L (ref 99–110)
CO2 SERPL-SCNC: 29 MMOL/L (ref 21–32)
CREAT SERPL-MCNC: 0.8 MG/DL (ref 0.6–1.1)
DEPRECATED RDW RBC AUTO: 16.4 % (ref 12.4–15.4)
EOSINOPHIL # BLD: 0 K/UL (ref 0–0.6)
EOSINOPHIL NFR BLD: 0.4 %
EST. AVERAGE GLUCOSE BLD GHB EST-MCNC: 180 MG/DL
EST. AVERAGE GLUCOSE BLD GHB EST-MCNC: 182.9 MG/DL
GFR SERPLBLD CREATININE-BSD FMLA CKD-EPI: 86 ML/MIN/{1.73_M2}
GLUCOSE BLD-MCNC: 259 MG/DL (ref 70–99)
GLUCOSE BLD-MCNC: 274 MG/DL (ref 70–99)
GLUCOSE BLD-MCNC: 322 MG/DL (ref 70–99)
GLUCOSE BLD-MCNC: 363 MG/DL (ref 70–99)
GLUCOSE BLD-MCNC: 411 MG/DL (ref 70–99)
GLUCOSE SERPL-MCNC: 363 MG/DL (ref 70–99)
HBA1C MFR BLD: 7.9 %
HBA1C MFR BLD: 8 %
HCT VFR BLD AUTO: 48.2 % (ref 36–48)
HGB BLD-MCNC: 15 G/DL (ref 12–16)
LYMPHOCYTES # BLD: 0.9 K/UL (ref 1–5.1)
LYMPHOCYTES NFR BLD: 12.6 %
MCH RBC QN AUTO: 25.9 PG (ref 26–34)
MCHC RBC AUTO-ENTMCNC: 31.1 G/DL (ref 31–36)
MCV RBC AUTO: 83.3 FL (ref 80–100)
MONOCYTES # BLD: 0.6 K/UL (ref 0–1.3)
MONOCYTES NFR BLD: 8.1 %
MRSA DNA SPEC QL NAA+PROBE: NORMAL
NEUTROPHILS # BLD: 5.4 K/UL (ref 1.7–7.7)
NEUTROPHILS NFR BLD: 78.9 %
ORGANISM: ABNORMAL
PERFORMED ON: ABNORMAL
PLATELET # BLD AUTO: 113 K/UL (ref 135–450)
PMV BLD AUTO: 7.9 FL (ref 5–10.5)
POTASSIUM SERPL-SCNC: 4.6 MMOL/L (ref 3.5–5.1)
RBC # BLD AUTO: 5.79 M/UL (ref 4–5.2)
SODIUM SERPL-SCNC: 135 MMOL/L (ref 136–145)
VANCOMYCIN SERPL-MCNC: 29.7 UG/ML
WBC # BLD AUTO: 6.9 K/UL (ref 4–11)

## 2024-04-10 PROCEDURE — 2580000003 HC RX 258: Performed by: INTERNAL MEDICINE

## 2024-04-10 PROCEDURE — 6370000000 HC RX 637 (ALT 250 FOR IP): Performed by: NURSE PRACTITIONER

## 2024-04-10 PROCEDURE — 6370000000 HC RX 637 (ALT 250 FOR IP): Performed by: INTERNAL MEDICINE

## 2024-04-10 PROCEDURE — 36415 COLL VENOUS BLD VENIPUNCTURE: CPT

## 2024-04-10 PROCEDURE — 94640 AIRWAY INHALATION TREATMENT: CPT

## 2024-04-10 PROCEDURE — 1200000000 HC SEMI PRIVATE

## 2024-04-10 PROCEDURE — 80202 ASSAY OF VANCOMYCIN: CPT

## 2024-04-10 PROCEDURE — 94761 N-INVAS EAR/PLS OXIMETRY MLT: CPT

## 2024-04-10 PROCEDURE — 2700000000 HC OXYGEN THERAPY PER DAY

## 2024-04-10 PROCEDURE — 6360000002 HC RX W HCPCS: Performed by: INTERNAL MEDICINE

## 2024-04-10 PROCEDURE — 80048 BASIC METABOLIC PNL TOTAL CA: CPT

## 2024-04-10 PROCEDURE — 83036 HEMOGLOBIN GLYCOSYLATED A1C: CPT

## 2024-04-10 PROCEDURE — 99232 SBSQ HOSP IP/OBS MODERATE 35: CPT | Performed by: INTERNAL MEDICINE

## 2024-04-10 PROCEDURE — 85025 COMPLETE CBC W/AUTO DIFF WBC: CPT

## 2024-04-10 RX ORDER — INSULIN LISPRO 100 [IU]/ML
12 INJECTION, SOLUTION INTRAVENOUS; SUBCUTANEOUS
Status: DISCONTINUED | OUTPATIENT
Start: 2024-04-10 | End: 2024-04-13 | Stop reason: HOSPADM

## 2024-04-10 RX ORDER — INSULIN GLARGINE 100 [IU]/ML
3 INJECTION, SOLUTION SUBCUTANEOUS ONCE
Status: COMPLETED | OUTPATIENT
Start: 2024-04-10 | End: 2024-04-10

## 2024-04-10 RX ORDER — INSULIN GLARGINE 100 [IU]/ML
23 INJECTION, SOLUTION SUBCUTANEOUS NIGHTLY
Status: DISCONTINUED | OUTPATIENT
Start: 2024-04-10 | End: 2024-04-13 | Stop reason: HOSPADM

## 2024-04-10 RX ADMIN — CARVEDILOL 12.5 MG: 12.5 TABLET, FILM COATED ORAL at 18:45

## 2024-04-10 RX ADMIN — IPRATROPIUM BROMIDE AND ALBUTEROL SULFATE 1 DOSE: 2.5; .5 SOLUTION RESPIRATORY (INHALATION) at 16:15

## 2024-04-10 RX ADMIN — MOMETASONE FUROATE AND FORMOTEROL FUMARATE DIHYDRATE 2 PUFF: 200; 5 AEROSOL RESPIRATORY (INHALATION) at 08:33

## 2024-04-10 RX ADMIN — ENOXAPARIN SODIUM 40 MG: 100 INJECTION SUBCUTANEOUS at 10:06

## 2024-04-10 RX ADMIN — VANCOMYCIN HYDROCHLORIDE 1000 MG: 1 INJECTION, POWDER, LYOPHILIZED, FOR SOLUTION INTRAVENOUS at 15:23

## 2024-04-10 RX ADMIN — Medication: at 10:30

## 2024-04-10 RX ADMIN — INSULIN GLARGINE 3 UNITS: 100 INJECTION, SOLUTION SUBCUTANEOUS at 12:22

## 2024-04-10 RX ADMIN — INSULIN LISPRO 4 UNITS: 100 INJECTION, SOLUTION INTRAVENOUS; SUBCUTANEOUS at 10:31

## 2024-04-10 RX ADMIN — INSULIN LISPRO 12 UNITS: 100 INJECTION, SOLUTION INTRAVENOUS; SUBCUTANEOUS at 12:24

## 2024-04-10 RX ADMIN — IPRATROPIUM BROMIDE AND ALBUTEROL SULFATE 1 DOSE: 2.5; .5 SOLUTION RESPIRATORY (INHALATION) at 20:28

## 2024-04-10 RX ADMIN — INSULIN LISPRO 4 UNITS: 100 INJECTION, SOLUTION INTRAVENOUS; SUBCUTANEOUS at 12:24

## 2024-04-10 RX ADMIN — CETIRIZINE HYDROCHLORIDE 5 MG: 10 TABLET, FILM COATED ORAL at 10:30

## 2024-04-10 RX ADMIN — INSULIN GLARGINE 23 UNITS: 100 INJECTION, SOLUTION SUBCUTANEOUS at 21:03

## 2024-04-10 RX ADMIN — LOSARTAN POTASSIUM 50 MG: 50 TABLET, FILM COATED ORAL at 10:30

## 2024-04-10 RX ADMIN — IPRATROPIUM BROMIDE AND ALBUTEROL SULFATE 1 DOSE: 2.5; .5 SOLUTION RESPIRATORY (INHALATION) at 08:32

## 2024-04-10 RX ADMIN — LEVOTHYROXINE SODIUM 50 MCG: 0.05 TABLET ORAL at 05:03

## 2024-04-10 RX ADMIN — IPRATROPIUM BROMIDE AND ALBUTEROL SULFATE 1 DOSE: 2.5; .5 SOLUTION RESPIRATORY (INHALATION) at 12:47

## 2024-04-10 RX ADMIN — OXYCODONE AND ACETAMINOPHEN 1 TABLET: 5; 325 TABLET ORAL at 21:04

## 2024-04-10 RX ADMIN — HYDROCHLOROTHIAZIDE 12.5 MG: 25 TABLET ORAL at 10:30

## 2024-04-10 RX ADMIN — OXYCODONE AND ACETAMINOPHEN 1 TABLET: 5; 325 TABLET ORAL at 12:58

## 2024-04-10 RX ADMIN — INSULIN LISPRO 4 UNITS: 100 INJECTION, SOLUTION INTRAVENOUS; SUBCUTANEOUS at 21:03

## 2024-04-10 RX ADMIN — CARVEDILOL 12.5 MG: 12.5 TABLET, FILM COATED ORAL at 10:30

## 2024-04-10 RX ADMIN — ATORVASTATIN CALCIUM 40 MG: 40 TABLET, FILM COATED ORAL at 21:02

## 2024-04-10 RX ADMIN — CEFEPIME 2000 MG: 2 INJECTION, POWDER, FOR SOLUTION INTRAVENOUS at 12:23

## 2024-04-10 RX ADMIN — INSULIN LISPRO 8 UNITS: 100 INJECTION, SOLUTION INTRAVENOUS; SUBCUTANEOUS at 18:45

## 2024-04-10 RX ADMIN — INSULIN LISPRO 10 UNITS: 100 INJECTION, SOLUTION INTRAVENOUS; SUBCUTANEOUS at 10:32

## 2024-04-10 RX ADMIN — SODIUM CHLORIDE, PRESERVATIVE FREE 10 ML: 5 INJECTION INTRAVENOUS at 10:32

## 2024-04-10 RX ADMIN — MOMETASONE FUROATE AND FORMOTEROL FUMARATE DIHYDRATE 2 PUFF: 200; 5 AEROSOL RESPIRATORY (INHALATION) at 20:28

## 2024-04-10 RX ADMIN — OXYCODONE AND ACETAMINOPHEN 1 TABLET: 5; 325 TABLET ORAL at 06:12

## 2024-04-10 RX ADMIN — VANCOMYCIN 1250 MG: 1.75 INJECTION, SOLUTION INTRAVENOUS at 00:56

## 2024-04-10 RX ADMIN — SODIUM CHLORIDE, PRESERVATIVE FREE 10 ML: 5 INJECTION INTRAVENOUS at 21:06

## 2024-04-10 RX ADMIN — METHYLPREDNISOLONE SODIUM SUCCINATE 40 MG: 40 INJECTION INTRAMUSCULAR; INTRAVENOUS at 10:30

## 2024-04-10 ASSESSMENT — PAIN SCALES - GENERAL
PAINLEVEL_OUTOF10: 0
PAINLEVEL_OUTOF10: 0
PAINLEVEL_OUTOF10: 7
PAINLEVEL_OUTOF10: 4
PAINLEVEL_OUTOF10: 9
PAINLEVEL_OUTOF10: 8
PAINLEVEL_OUTOF10: 5

## 2024-04-10 ASSESSMENT — PAIN - FUNCTIONAL ASSESSMENT
PAIN_FUNCTIONAL_ASSESSMENT: PREVENTS OR INTERFERES SOME ACTIVE ACTIVITIES AND ADLS

## 2024-04-10 ASSESSMENT — PAIN DESCRIPTION - LOCATION
LOCATION: KNEE

## 2024-04-10 ASSESSMENT — PAIN DESCRIPTION - PAIN TYPE
TYPE: CHRONIC PAIN

## 2024-04-10 ASSESSMENT — PAIN DESCRIPTION - ORIENTATION
ORIENTATION: RIGHT;LEFT

## 2024-04-10 ASSESSMENT — PAIN DESCRIPTION - FREQUENCY
FREQUENCY: CONTINUOUS

## 2024-04-10 ASSESSMENT — PAIN DESCRIPTION - DESCRIPTORS
DESCRIPTORS: THROBBING
DESCRIPTORS: ACHING
DESCRIPTORS: THROBBING
DESCRIPTORS: THROBBING

## 2024-04-10 ASSESSMENT — PAIN DESCRIPTION - ONSET
ONSET: ON-GOING

## 2024-04-10 ASSESSMENT — PAIN SCALES - WONG BAKER: WONGBAKER_NUMERICALRESPONSE: NO HURT

## 2024-04-10 NOTE — PROGRESS NOTES
Clinical Pharmacy Note  Vancomycin Consult    Lacey Champagne is a 54 y.o. female ordered vancomycin for HAP; consult received from Dr. Kraus to manage therapy. Also receiving cefepime.    Allergies:  Doxycycline, Ketorolac tromethamine, Morphine and related, Codeine, Pcn [penicillins], and Ciprofloxacin     Temp max:  Temp (24hrs), Av °F (36.7 °C), Min:97.6 °F (36.4 °C), Max:98.4 °F (36.9 °C)      Recent Labs     24  0035 04/10/24  0502   WBC 5.4 6.9       Recent Labs     24  0035 04/10/24  0502   BUN 20 36*   CREATININE 0.8 0.8         Intake/Output Summary (Last 24 hours) at 4/10/2024 0647  Last data filed at 4/10/2024 0512  Gross per 24 hour   Intake 1020 ml   Output --   Net 1020 ml       Culture Results:  24 blood culture - no growth to date  MRSA nasal probe - in process    Ht Readings from Last 1 Encounters:   24 1.6 m (5' 3\")        Wt Readings from Last 1 Encounters:   04/10/24 98.3 kg (216 lb 11.4 oz)         Estimated Creatinine Clearance: 88 mL/min (based on SCr of 0.8 mg/dL).    Assessment:  Day # 2 of vancomycin.  Current regimen: 1250 mg every 12 hours  Vancomycin level: 29.7 mg/L (~4 hour level)  Predicted AUC: 681    Plan:  Change regimen to 1000 mg every 12 hours.  New predicted AUC: 552  Check next vancomycin random level on 24    Thank you for the consult.     Alisson Najera, PharmD  4/10/2024 6:53 AM

## 2024-04-10 NOTE — PROGRESS NOTES
Patient is resting in chair, awake and quiet. Patient is on 2L of O2 NC not home dependent. Patient is UAL. Call light, telephone and bedside table are within reach. Will continue to monitor patient per unit protocols. Electronically signed by Qiana Sinha RN on 4/10/2024 at 6:07 PM

## 2024-04-10 NOTE — PROGRESS NOTES
Patient with a Hemoglobin A1c that still needs to be collected. Call placed to lab and states it will be added on to morning labs.

## 2024-04-10 NOTE — PLAN OF CARE
Problem: Discharge Planning  Goal: Discharge to home or other facility with appropriate resources  4/9/2024 2156 by Jennifer Gill, RN  Outcome: Progressing  Flowsheets (Taken 4/9/2024 2156)  Discharge to home or other facility with appropriate resources:   Identify barriers to discharge with patient and caregiver   Identify discharge learning needs (meds, wound care, etc)   Arrange for needed discharge resources and transportation as appropriate  4/9/2024 0940 by Addis Barcenas RN  Outcome: Progressing     Problem: Pain  Goal: Verbalizes/displays adequate comfort level or baseline comfort level  4/9/2024 2156 by Jennifer Gill, RN  Outcome: Progressing  Flowsheets (Taken 4/9/2024 2156)  Verbalizes/displays adequate comfort level or baseline comfort level:   Encourage patient to monitor pain and request assistance   Administer analgesics based on type and severity of pain and evaluate response   Assess pain using appropriate pain scale  4/9/2024 0940 by Addis Barcenas, RN  Outcome: Progressing

## 2024-04-10 NOTE — PROGRESS NOTES
Patient is A&Ox4. Patient is resting in chair, awake and quiet. Patient is on 2L of O2 NC not home dependent. Patient is UAL. Call light, telephone and bedside table are within reach. VSS taken. AM meds given. Shift assessment assessment completed. Medihoney and dry gauze applied to R great toe. Patient tolerated well. Will continue to monitor patient per unit protocols. Electronically signed by Qiana Sinha RN on 4/10/2024 at 6:07 PM

## 2024-04-10 NOTE — PROGRESS NOTES
Pulmonary Progress Note    CC:  Follow up hypoxia, COPD    Subjective:  3 liters of oxygen   Doing ok with breathing  More foot pain than any breathing issues      Intake/Output Summary (Last 24 hours) at 4/10/2024 0902  Last data filed at 4/10/2024 0754  Gross per 24 hour   Intake 1470 ml   Output --   Net 1470 ml         PHYSICAL EXAM:  Blood pressure 118/74, pulse 61, temperature 98.2 °F (36.8 °C), temperature source Oral, resp. rate 16, height 1.6 m (5' 3\"), weight 98.3 kg (216 lb 11.4 oz), SpO2 93 %.'  Gen: No distress. Chronically ill   Eyes: PERRL. No sclera icterus. No conjunctival injection.   ENT: No discharge. Pharynx clear. External appearance of ears and nose normal.  Neck: Trachea midline. No obvious mass.    Resp: Essentially clear.  Kyphotic   CV: Regular rate. Regular rhythm. No murmur or rub.    GI: Non-tender. Non-distended. No hernia.   Skin: venous stasis changes    Lymph: No cervical LAD. No supraclavicular LAD.   M/S: No cyanosis. No clubbing. No joint deformity.    Neuro: Moves all four extremities. CN 2-12 tested, no defect noted.  Ext:   ++ edema    Medications:    Scheduled Meds:   vancomycin  1,000 mg IntraVENous Q12H    sodium chloride flush  5-40 mL IntraVENous 2 times per day    enoxaparin  40 mg SubCUTAneous Daily    ipratropium 0.5 mg-albuterol 2.5 mg  1 Dose Inhalation Q4H WA RT    atorvastatin  40 mg Oral Nightly    carvedilol  12.5 mg Oral BID WC    insulin glargine  20 Units SubCUTAneous Nightly    levothyroxine  50 mcg Oral QAM AC    cefepime  2,000 mg IntraVENous Q12H    insulin lispro  0-8 Units SubCUTAneous TID     insulin lispro  0-4 Units SubCUTAneous Nightly    methylPREDNISolone  40 mg IntraVENous Daily    Followed by    [START ON 4/11/2024] predniSONE  40 mg Oral Daily    insulin lispro  10 Units SubCUTAneous TID     vancomycin (VANCOCIN) intermittent dosing (placeholder)   Other RX Placeholder    mometasone-formoterol  2 puff Inhalation BID RT    medihoney

## 2024-04-10 NOTE — PROGRESS NOTES
V2.0    [facility] [place]      Name:  Lacey Champagne /Age/Sex: 1970  (54 y.o. female)   MRN & CSN:  [id number] & [id number] Encounter Date/Time: 4/10/2024 6:01 AM EDT   Location:  [id number]-0680/3106-01 PCP: [facility]     Attending:Familia August MD       Hospital Day: 3    Assessment and Recommendations   Lacey Champagne is a 54 y.o. female who presents with COPD exacerbation (HCC)      Plan:   Diabetic foot infection, possible osteomyelitis, IV vancomycin and cefepime, podiatry consulted continue on IV antibiotics.    Diabetes mellitus uncontrolled likely due to steroids, steroids dose decreased, insulin doses adjusted yesterday discussed with nursing increasing insulin Lantus today increasing preprandial doses  Possible COPD exacerbation, even though patient did not have complaints, found to be hypoxic with minimal cough steroids DuoNeb pulmonology consulted likely will need oxygen on discharge   Acute on chronic respiratory failure with hypoxia, with oxygen saturation significantly less than 90% with some cough started on oxygen keep saturation of 90%  Peripheral vascular disease follow-up with outpatient      Diet ADULT DIET; Regular; 4 carb choices (60 gm/meal)   DVT Prophylaxis [] Lovenox, []  Heparin, [] SCDs, [] Ambulation,  [] Eliquis, [] Xarelto  [] Coumadin   Code Status Full Code             Personally reviewed Lab Studies and Imaging     Discussed management of the case with pulmonology  Telemetry strip reviewed by myself no ST elevation      Drugs that require monitoring for toxicity include vancomycin and the method of monitoring was creatinine    Medical Decision Making:  The following items were considered in medical decision making:  Discussion of patient care with other providers  Reviewed clinical lab tests  Reviewed radiology tests  Reviewed other diagnostic tests/interventions  Independent review of radiologic images  Microbiology cultures and other micro tests  HISTORY: ORDERING SYSTEM PROVIDED HISTORY: wound attn great toe TECHNOLOGIST PROVIDED HISTORY: Reason for exam:->wound attn great toe Reason for Exam: Wound; attn right great toe Additional signs and symptoms: Wound on toe Relevant Medical/Surgical History: Former smoker.  Hx of anemia, asthma, CHF, chronic venous HTN (idiopathic) w/ulcer of bilateral lower extremeties, COPD, diabetes, edema, foot ulcer, HLD, peripheral artery disease), thyroid disease, & vascula surgery (left arterial stent). FINDINGS: Soft tissue swelling predominately involving the 1st digit.  No acute fracture or dislocation.  Joint spaces and alignment are otherwise maintained.  No discrete erosive changes.     No acute osseous abnormality.       CBC:   Recent Labs     04/09/24 0035   WBC 5.4   HGB 17.1*   *     BMP:    Recent Labs     04/09/24 0035      K 5.3*   CL 95*   CO2 31   BUN 20   CREATININE 0.8   GLUCOSE 247*     Hepatic:   Recent Labs     04/09/24 0035   AST 36   ALT 19   BILITOT 0.8   ALKPHOS 123     Lipids:   Lab Results   Component Value Date/Time    CHOL 156 11/18/2013 05:48 AM    HDL 40 11/18/2013 05:48 AM    TRIG 131 11/18/2013 05:48 AM     Hemoglobin A1C:   Lab Results   Component Value Date/Time    LABA1C 6.8 06/14/2023 03:42 PM     TSH: No results found for: \"TSH\"  Troponin: No results found for: \"TROPONINT\"  Lactic Acid: No results for input(s): \"LACTA\" in the last 72 hours.  BNP:   Recent Labs     04/09/24 0035   PROBNP 350*     UA:  Lab Results   Component Value Date/Time    NITRU POSITIVE 04/09/2024 02:18 AM    COLORU Yellow 04/09/2024 02:18 AM    PHUR 6.0 04/09/2024 02:18 AM    WBCUA 21-50 04/09/2024 02:18 AM    RBCUA None seen 04/09/2024 02:18 AM    BACTERIA 3+ 04/09/2024 02:18 AM    CLARITYU Clear 04/09/2024 02:18 AM    SPECGRAV 1.025 04/09/2024 02:18 AM    LEUKOCYTESUR TRACE 04/09/2024 02:18 AM    UROBILINOGEN 1.0 04/09/2024 02:18 AM    BILIRUBINUR Negative 04/09/2024 02:18 AM    BILIRUBINUR NEGATIVE

## 2024-04-10 NOTE — PLAN OF CARE
Problem: Discharge Planning  Goal: Discharge to home or other facility with appropriate resources  4/10/2024 0732 by Qiana Sinha, RN  Outcome: Progressing   Assessed patients knowledge of discharge.  Will continue to work with patient on discharge planning and answer patient questions. Will consult case management and  as necessary.   Problem: Pain  Goal: Verbalizes/displays adequate comfort level or baseline comfort level  4/10/2024 0732 by Qiana Sinha, RN  Outcome: Progressing   Patient educated on acute pain.  Taught patient to use call light to ask for pain medication.  PRN pain medication given for acute pain.  Will continue to monitor pain per unit protocol.

## 2024-04-10 NOTE — PROGRESS NOTES
Patient complaining of pain unrelieved by tylenol and requesting something else to help with pain. Perfect serve message sent to on call NP, Lynn Shah, with patient requests. Awaiting response.

## 2024-04-11 LAB
CRP SERPL-MCNC: <3 MG/L (ref 0–5.1)
ERYTHROCYTE [SEDIMENTATION RATE] IN BLOOD BY WESTERGREN METHOD: 19 MM/HR (ref 0–30)
GLUCOSE BLD-MCNC: 135 MG/DL (ref 70–99)
GLUCOSE BLD-MCNC: 249 MG/DL (ref 70–99)
GLUCOSE BLD-MCNC: 261 MG/DL (ref 70–99)
GLUCOSE BLD-MCNC: 311 MG/DL (ref 70–99)
GLUCOSE BLD-MCNC: 349 MG/DL (ref 70–99)
PERFORMED ON: ABNORMAL

## 2024-04-11 PROCEDURE — 85652 RBC SED RATE AUTOMATED: CPT

## 2024-04-11 PROCEDURE — 6370000000 HC RX 637 (ALT 250 FOR IP): Performed by: INTERNAL MEDICINE

## 2024-04-11 PROCEDURE — 86140 C-REACTIVE PROTEIN: CPT

## 2024-04-11 PROCEDURE — 1200000000 HC SEMI PRIVATE

## 2024-04-11 PROCEDURE — 6360000002 HC RX W HCPCS: Performed by: INTERNAL MEDICINE

## 2024-04-11 PROCEDURE — 6370000000 HC RX 637 (ALT 250 FOR IP): Performed by: NURSE PRACTITIONER

## 2024-04-11 PROCEDURE — 2700000000 HC OXYGEN THERAPY PER DAY

## 2024-04-11 PROCEDURE — 94760 N-INVAS EAR/PLS OXIMETRY 1: CPT

## 2024-04-11 PROCEDURE — 6370000000 HC RX 637 (ALT 250 FOR IP): Performed by: REGISTERED NURSE

## 2024-04-11 PROCEDURE — 2580000003 HC RX 258: Performed by: INTERNAL MEDICINE

## 2024-04-11 PROCEDURE — 36415 COLL VENOUS BLD VENIPUNCTURE: CPT

## 2024-04-11 PROCEDURE — 94640 AIRWAY INHALATION TREATMENT: CPT

## 2024-04-11 PROCEDURE — 99232 SBSQ HOSP IP/OBS MODERATE 35: CPT | Performed by: INTERNAL MEDICINE

## 2024-04-11 RX ORDER — INSULIN LISPRO 100 [IU]/ML
4 INJECTION, SOLUTION INTRAVENOUS; SUBCUTANEOUS ONCE
Status: COMPLETED | OUTPATIENT
Start: 2024-04-11 | End: 2024-04-11

## 2024-04-11 RX ADMIN — CEFEPIME 2000 MG: 2 INJECTION, POWDER, FOR SOLUTION INTRAVENOUS at 00:16

## 2024-04-11 RX ADMIN — ENOXAPARIN SODIUM 40 MG: 100 INJECTION SUBCUTANEOUS at 09:19

## 2024-04-11 RX ADMIN — VANCOMYCIN HYDROCHLORIDE 1000 MG: 1 INJECTION, POWDER, LYOPHILIZED, FOR SOLUTION INTRAVENOUS at 16:18

## 2024-04-11 RX ADMIN — PREDNISONE 40 MG: 20 TABLET ORAL at 09:09

## 2024-04-11 RX ADMIN — CARVEDILOL 12.5 MG: 12.5 TABLET, FILM COATED ORAL at 18:55

## 2024-04-11 RX ADMIN — CETIRIZINE HYDROCHLORIDE 5 MG: 10 TABLET, FILM COATED ORAL at 09:09

## 2024-04-11 RX ADMIN — SODIUM CHLORIDE, PRESERVATIVE FREE 10 ML: 5 INJECTION INTRAVENOUS at 09:10

## 2024-04-11 RX ADMIN — LOSARTAN POTASSIUM 50 MG: 50 TABLET, FILM COATED ORAL at 09:09

## 2024-04-11 RX ADMIN — MOMETASONE FUROATE AND FORMOTEROL FUMARATE DIHYDRATE 2 PUFF: 200; 5 AEROSOL RESPIRATORY (INHALATION) at 07:55

## 2024-04-11 RX ADMIN — IPRATROPIUM BROMIDE AND ALBUTEROL SULFATE 1 DOSE: 2.5; .5 SOLUTION RESPIRATORY (INHALATION) at 19:21

## 2024-04-11 RX ADMIN — IPRATROPIUM BROMIDE AND ALBUTEROL SULFATE 1 DOSE: 2.5; .5 SOLUTION RESPIRATORY (INHALATION) at 16:29

## 2024-04-11 RX ADMIN — OXYCODONE AND ACETAMINOPHEN 1 TABLET: 5; 325 TABLET ORAL at 16:05

## 2024-04-11 RX ADMIN — INSULIN LISPRO 6 UNITS: 100 INJECTION, SOLUTION INTRAVENOUS; SUBCUTANEOUS at 18:55

## 2024-04-11 RX ADMIN — INSULIN LISPRO 12 UNITS: 100 INJECTION, SOLUTION INTRAVENOUS; SUBCUTANEOUS at 09:10

## 2024-04-11 RX ADMIN — CARVEDILOL 12.5 MG: 12.5 TABLET, FILM COATED ORAL at 09:09

## 2024-04-11 RX ADMIN — IPRATROPIUM BROMIDE AND ALBUTEROL SULFATE 1 DOSE: 2.5; .5 SOLUTION RESPIRATORY (INHALATION) at 11:48

## 2024-04-11 RX ADMIN — Medication: at 09:10

## 2024-04-11 RX ADMIN — ATORVASTATIN CALCIUM 40 MG: 40 TABLET, FILM COATED ORAL at 20:30

## 2024-04-11 RX ADMIN — INSULIN GLARGINE 23 UNITS: 100 INJECTION, SOLUTION SUBCUTANEOUS at 20:30

## 2024-04-11 RX ADMIN — CEFEPIME 2000 MG: 2 INJECTION, POWDER, FOR SOLUTION INTRAVENOUS at 12:12

## 2024-04-11 RX ADMIN — OXYCODONE AND ACETAMINOPHEN 1 TABLET: 5; 325 TABLET ORAL at 03:06

## 2024-04-11 RX ADMIN — INSULIN LISPRO 12 UNITS: 100 INJECTION, SOLUTION INTRAVENOUS; SUBCUTANEOUS at 13:05

## 2024-04-11 RX ADMIN — VANCOMYCIN HYDROCHLORIDE 1000 MG: 1 INJECTION, POWDER, LYOPHILIZED, FOR SOLUTION INTRAVENOUS at 04:23

## 2024-04-11 RX ADMIN — OXYCODONE AND ACETAMINOPHEN 1 TABLET: 5; 325 TABLET ORAL at 09:08

## 2024-04-11 RX ADMIN — INSULIN LISPRO 12 UNITS: 100 INJECTION, SOLUTION INTRAVENOUS; SUBCUTANEOUS at 18:55

## 2024-04-11 RX ADMIN — INSULIN LISPRO 4 UNITS: 100 INJECTION, SOLUTION INTRAVENOUS; SUBCUTANEOUS at 09:09

## 2024-04-11 RX ADMIN — INSULIN LISPRO 4 UNITS: 100 INJECTION, SOLUTION INTRAVENOUS; SUBCUTANEOUS at 03:00

## 2024-04-11 RX ADMIN — MOMETASONE FUROATE AND FORMOTEROL FUMARATE DIHYDRATE 2 PUFF: 200; 5 AEROSOL RESPIRATORY (INHALATION) at 19:21

## 2024-04-11 RX ADMIN — OXYCODONE AND ACETAMINOPHEN 1 TABLET: 5; 325 TABLET ORAL at 22:10

## 2024-04-11 RX ADMIN — Medication 10 ML: at 12:09

## 2024-04-11 RX ADMIN — LEVOTHYROXINE SODIUM 50 MCG: 0.05 TABLET ORAL at 05:33

## 2024-04-11 RX ADMIN — HYDROCHLOROTHIAZIDE 12.5 MG: 25 TABLET ORAL at 09:09

## 2024-04-11 RX ADMIN — IPRATROPIUM BROMIDE AND ALBUTEROL SULFATE 1 DOSE: 2.5; .5 SOLUTION RESPIRATORY (INHALATION) at 07:55

## 2024-04-11 ASSESSMENT — PAIN DESCRIPTION - ORIENTATION
ORIENTATION: RIGHT;LEFT
ORIENTATION: LEFT;RIGHT
ORIENTATION: RIGHT;LEFT

## 2024-04-11 ASSESSMENT — PAIN DESCRIPTION - PAIN TYPE
TYPE: CHRONIC PAIN

## 2024-04-11 ASSESSMENT — PAIN DESCRIPTION - LOCATION
LOCATION: KNEE

## 2024-04-11 ASSESSMENT — PAIN DESCRIPTION - DESCRIPTORS
DESCRIPTORS: ACHING
DESCRIPTORS: ACHING;THROBBING
DESCRIPTORS: THROBBING;ACHING
DESCRIPTORS: ACHING;THROBBING
DESCRIPTORS: THROBBING
DESCRIPTORS: THROBBING

## 2024-04-11 ASSESSMENT — PAIN DESCRIPTION - FREQUENCY
FREQUENCY: CONTINUOUS

## 2024-04-11 ASSESSMENT — PAIN DESCRIPTION - ONSET
ONSET: ON-GOING

## 2024-04-11 ASSESSMENT — PAIN SCALES - GENERAL
PAINLEVEL_OUTOF10: 9
PAINLEVEL_OUTOF10: 4
PAINLEVEL_OUTOF10: 8
PAINLEVEL_OUTOF10: 8
PAINLEVEL_OUTOF10: 0
PAINLEVEL_OUTOF10: 4
PAINLEVEL_OUTOF10: 10

## 2024-04-11 ASSESSMENT — PAIN SCALES - WONG BAKER: WONGBAKER_NUMERICALRESPONSE: NO HURT

## 2024-04-11 NOTE — PROGRESS NOTES
Daily       Continuous Infusions:   sodium chloride      dextrose         PRN Meds:  sodium chloride flush, sodium chloride, ondansetron **OR** ondansetron, polyethylene glycol, acetaminophen **OR** acetaminophen, glucose, dextrose bolus **OR** dextrose bolus, glucagon (rDNA), dextrose, oxyCODONE-acetaminophen    Labs:  CBC:   Recent Labs     04/09/24  0035 04/10/24  0502   WBC 5.4 6.9   HGB 17.1* 15.0   HCT 55.5* 48.2*   MCV 84.3 83.3   * 113*       BMP:   Recent Labs     04/09/24  0035 04/10/24  0502    135*   K 5.3* 4.6   CL 95* 97*   CO2 31 29   BUN 20 36*   CREATININE 0.8 0.8       LIVER PROFILE:   Recent Labs     24   AST 36   ALT 19   BILITOT 0.8   ALKPHOS 123       PT/INR:   Recent Labs     24   PROTIME 13.7   INR 1.03       APTT: No results for input(s): \"APTT\" in the last 72 hours.  UA:  Recent Labs     24  0218   COLORU Yellow   PHUR 6.0   WBCUA 21-50*   RBCUA None seen   BACTERIA 3+*   CLARITYU Clear   SPECGRAV 1.025   LEUKOCYTESUR TRACE*   UROBILINOGEN 1.0   BILIRUBINUR Negative   BLOODU Negative   GLUCOSEU Negative       No results for input(s): \"PH\", \"PCO2\", \"PO2\" in the last 72 hours.         PFTs:      Mild obstruction      ECHO:   None     VB.     Chest X-ray:  Chest imaging was reviewed by me and showed appearance of chronic lung disease      I reviewed all the above labs and studies pertaining to this visit.        ASSESSMENT/PLAN:  Acute Hypoxic Respiratory Failure with saturations less than 90% on room air with history of previous oxygen requirements (last on oxygen one year ago)  Titrate oxygen for saturations greater than or equal to 90%  Will need home oxygen assessment prior to DC  Chronic Hypercapnic Respiratory Failure due to COPD  Compensated at this time.  No need for NIV  COPD exacerbation  (likely due to lower oxygen readings than normal)  Continue with duonebs  Dulera q 12 hours  Solumedrol to prednisone   Increase activity to

## 2024-04-11 NOTE — PROGRESS NOTES
Message sent to Frantz De León MD to see if patient will be discharged home today per request of RT Almazan as home O2 evaluation recommendations  after 24 hours. MD De León states that patient will not be discharged today. Request made by this RN to see if order can be discontinued & replaced when patient is closer to discharge. Waiting for response at this time.     Request also made to MD to see if medihoney can be applied to new wound/ abrasion on patient's L 4th toe. Waiting for response at this time. MD aware of wound consult in place to evaluate new wound / abrasion.

## 2024-04-11 NOTE — CARE COORDINATION
Wound care consulted for \"new wound to L 4th toe.\" Previously seen by podiatry. Small circular area noted to dorsal toe, no redness or drainage, skin intact. No wound care needs at this time. Will not continue to follow.   Electronically signed by Shantel Chang RN CWOCN  on 4/11/2024 at 3:21 PM

## 2024-04-11 NOTE — PROGRESS NOTES
Lunch prandial insulin held at this time as patient's lunch tray has not been delivered yet. Glucose 135. Patient agreeable to waiting to eat for insulin administration.

## 2024-04-11 NOTE — PROGRESS NOTES
Pt is alert and oriented x4, resting quietly in bed. Nightly medications and intake tolerated well. No complaints of nausea or vomiting. Pt having some pain - PRN pain medications given as ordered. No other needs made known at this time. Fall precautions in place. Call light within reach. Will continue to monitor.    Electronically signed by Viraj Keller RN on 4/11/2024 at 3:59 AM

## 2024-04-11 NOTE — PROGRESS NOTES
V2.0    Patient:  Lacey Champagne 54 y.o. female MRN: [id number]     Date of Service: 4/11/2024    Disposition :  Home likely 1 day    Assessment and plan :    Lacey Champagne is a 54 y.o. female who presents with COPD exacerbation (HCC)        Plan:   Diabetic foot infection, possible osteomyelitis less likely though, IV vancomycin and cefepime, podiatry consulted continue on IV antibiotics.  ESR and CRP added if within normal limits, likely switch to p.o. antibiotics for diabetic foot wound.  Diabetes mellitus uncontrolled likely due to steroids, continue with insulin sliding scale.  Possible COPD exacerbation, even though patient did not have complaints, found to be hypoxic with minimal cough steroids DuoNeb pulmonology consulted likely will need oxygen on discharge resolving, continue with breathing treatment, prednisone last dose 04/13/2024, home O2 evaluation ordered  Acute on chronic respiratory failure with hypoxia, with oxygen saturation significantly less than 90% with some cough started on oxygen keep saturation of 90%, does not use any oxygen at home currently requiring 3 L, keep sats 88 to 92%, home O2 eval  Peripheral vascular disease follow-up with outpatient        Diet ADULT DIET; Regular; 4 carb choices (60 gm/meal)   DVT Prophylaxis [] Lovenox, []  Heparin, [x] SCDs, [] Ambulation,  [] Eliquis, [] Xarelto   Code Status Full Code   Disposition From: Home  Expected Disposition: Home/ ECF to be decided  Estimated Date of Discharge: none     Surrogate Decision Maker/ POA             Barrier for Discharge:      Heparin drip    Cardizem drip/Amiodarone drip    Pressors gtt    Protonix gtt    IV Lasix/Bumex    Pending Consult Eval    Pending Imaging/CT/MRI/Nuclear scan/Stress Test    IV Abx      Pending Cultures    Requiring BIPAP    Intubated/Mechanical Ventilation    Pending Placement    Pending Safe discharge plan    Pending Ongoing evaluation and improvement    Pending Clearance by Consult  chloride, , PRN  ondansetron, 4 mg, Q8H PRN   Or  ondansetron, 4 mg, Q6H PRN  polyethylene glycol, 17 g, Daily PRN  acetaminophen, 650 mg, Q6H PRN   Or  acetaminophen, 650 mg, Q6H PRN  glucose, 4 tablet, PRN  dextrose bolus, 125 mL, PRN   Or  dextrose bolus, 250 mL, PRN  glucagon (rDNA), 1 mg, PRN  dextrose, , Continuous PRN  oxyCODONE-acetaminophen, 1 tablet, Q6H PRN        Labs and Imaging   XR CHEST PORTABLE    Result Date: 4/9/2024  EXAMINATION: ONE XRAY VIEW OF THE CHEST 4/9/2024 12:10 am COMPARISON: 06/14/2023 HISTORY: ORDERING SYSTEM PROVIDED HISTORY: hypoxia TECHNOLOGIST PROVIDED HISTORY: Reason for exam:->hypoxia Reason for Exam: Hypoxia Additional signs and symptoms: Wound on right great toe Relevant Medical/Surgical History: Former smoker. Hx of anemia, asthma, CHF, chronic venous HTN (idiopathic) w/ulcer of bilateral lower extremeties, COPD, diabetes, edema, foot ulcer, HLD, peripheral artery disease), thyroid disease, & vascula surgery (left arterial stent). FINDINGS: Cardiac size and mediastinal structures appear within normal limits.  Aortic atherosclerosis.  No consolidation or pleural effusion.  No pneumothorax.  No acute osseous abnormality.  Mildly prominent interstitial change.     Mildly prominent interstitial change, possibly related to underlying COPD or mild edema.     XR FOOT RIGHT (MIN 3 VIEWS)    Result Date: 4/9/2024  EXAMINATION: 3 XRAY VIEWS OF THE RIGHT FOOT 4/9/2024 12:10 am COMPARISON: None. HISTORY: ORDERING SYSTEM PROVIDED HISTORY: wound attn great toe TECHNOLOGIST PROVIDED HISTORY: Reason for exam:->wound attn great toe Reason for Exam: Wound; attn right great toe Additional signs and symptoms: Wound on toe Relevant Medical/Surgical History: Former smoker.  Hx of anemia, asthma, CHF, chronic venous HTN (idiopathic) w/ulcer of bilateral lower extremeties, COPD, diabetes, edema, foot ulcer, HLD, peripheral artery disease), thyroid disease, & vascula surgery (left arterial stent).

## 2024-04-11 NOTE — PROGRESS NOTES
Patient resting in bed this morning reporting increased 10/10 chronic bilateral knee pain. PRN Percocet + scheduled morning medications administered per orders. See eMAR for documentation. Patient tolerated medications whole w/ water w/o complication.     Head to toe assessment completed and charted. See flowsheets for documentation. Patient remains on 3L of O2 via NC to maintain oxygen saturation > 90%.     Dressing change completed to patient R great toe per orders. Old dressing was removed and a small amount of serous drainage was present to the old dressing. Wound was cleansed w/ saline. Medihoney, a 2x2, and roll gauze in place. Patient tolerated w/o complication. Patient noted to have a new wound / abrasion to the L 4th toe. Wound cleansed w/ saline. A 2x2 and roll gauze was applied. Patient satisfied w/ this intervention. Wound care consult placed per protocol to evaluate new wound / abrasion to L 4th toe. Patient aware of order.     Patient denies further physical/emotional needs at this time. Call light, telephone, and bed side table are within reach. Fall precautions in place. Will continue to monitor and assess.

## 2024-04-11 NOTE — PLAN OF CARE
Problem: Discharge Planning  Goal: Discharge to home or other facility with appropriate resources  Outcome: Progressing  Flowsheets (Taken 4/11/2024 0359)  Discharge to home or other facility with appropriate resources:   Identify barriers to discharge with patient and caregiver   Arrange for needed discharge resources and transportation as appropriate     Problem: Pain  Goal: Verbalizes/displays adequate comfort level or baseline comfort level  Outcome: Progressing  Flowsheets (Taken 4/11/2024 0359)  Verbalizes/displays adequate comfort level or baseline comfort level:   Encourage patient to monitor pain and request assistance   Assess pain using appropriate pain scale   Administer analgesics based on type and severity of pain and evaluate response   Implement non-pharmacological measures as appropriate and evaluate response     Problem: Safety - Adult  Goal: Free from fall injury  Outcome: Progressing  Flowsheets (Taken 4/11/2024 0359)  Free From Fall Injury: Instruct family/caregiver on patient safety

## 2024-04-11 NOTE — PLAN OF CARE
Problem: Discharge Planning  Goal: Discharge to home or other facility with appropriate resources  4/11/2024 1301 by Aidee Sutton RN  Outcome: Progressing  Flowsheets (Taken 4/11/2024 1301)  Discharge to home or other facility with appropriate resources: Identify barriers to discharge with patient and caregiver  4/11/2024 0359 by Viraj Keller RN  Outcome: Progressing  Flowsheets (Taken 4/11/2024 0359)  Discharge to home or other facility with appropriate resources:   Identify barriers to discharge with patient and caregiver   Arrange for needed discharge resources and transportation as appropriate     Problem: Pain  Goal: Verbalizes/displays adequate comfort level or baseline comfort level  4/11/2024 1301 by Aidee Sutton RN  Outcome: Progressing  Flowsheets (Taken 4/11/2024 1301)  Verbalizes/displays adequate comfort level or baseline comfort level:   Encourage patient to monitor pain and request assistance   Administer analgesics based on type and severity of pain and evaluate response   Assess pain using appropriate pain scale   Consider cultural and social influences on pain and pain management   Implement non-pharmacological measures as appropriate and evaluate response   Notify Licensed Independent Practitioner if interventions unsuccessful or patient reports new pain  4/11/2024 0359 by Viraj Keller RN  Outcome: Progressing  Flowsheets (Taken 4/11/2024 0359)  Verbalizes/displays adequate comfort level or baseline comfort level:   Encourage patient to monitor pain and request assistance   Assess pain using appropriate pain scale   Administer analgesics based on type and severity of pain and evaluate response   Implement non-pharmacological measures as appropriate and evaluate response     Problem: Safety - Adult  Goal: Free from fall injury  4/11/2024 1301 by Aidee Sutton RN  Outcome: Progressing  Flowsheets (Taken 4/11/2024 1301)  Free From Fall Injury: Instruct family/caregiver on patient  safety  4/11/2024 0359 by Viraj Keller, RN  Outcome: Progressing  Flowsheets (Taken 4/11/2024 0359)  Free From Fall Injury: Instruct family/caregiver on patient safety

## 2024-04-12 PROBLEM — E11.628 TYPE 2 DIABETES MELLITUS WITH RIGHT DIABETIC FOOT INFECTION (HCC): Status: ACTIVE | Noted: 2024-04-12

## 2024-04-12 PROBLEM — L03.115 CELLULITIS OF RIGHT FOOT: Status: ACTIVE | Noted: 2024-04-12

## 2024-04-12 PROBLEM — E66.01 MORBID OBESITY DUE TO EXCESS CALORIES (HCC): Status: ACTIVE | Noted: 2024-04-12

## 2024-04-12 PROBLEM — L97.512 SKIN ULCER OF TOE OF RIGHT FOOT WITH FAT LAYER EXPOSED (HCC): Status: ACTIVE | Noted: 2024-04-12

## 2024-04-12 PROBLEM — L08.9 TYPE 2 DIABETES MELLITUS WITH RIGHT DIABETIC FOOT INFECTION (HCC): Status: ACTIVE | Noted: 2024-04-12

## 2024-04-12 LAB
CREAT SERPL-MCNC: 0.6 MG/DL (ref 0.6–1.1)
GFR SERPLBLD CREATININE-BSD FMLA CKD-EPI: >90 ML/MIN/{1.73_M2}
GLUCOSE BLD-MCNC: 134 MG/DL (ref 70–99)
GLUCOSE BLD-MCNC: 203 MG/DL (ref 70–99)
GLUCOSE BLD-MCNC: 265 MG/DL (ref 70–99)
GLUCOSE BLD-MCNC: 313 MG/DL (ref 70–99)
PERFORMED ON: ABNORMAL
URATE SERPL-MCNC: 4.6 MG/DL (ref 2.6–6)
VANCOMYCIN SERPL-MCNC: 17.6 UG/ML

## 2024-04-12 PROCEDURE — 2580000003 HC RX 258: Performed by: INTERNAL MEDICINE

## 2024-04-12 PROCEDURE — 6370000000 HC RX 637 (ALT 250 FOR IP): Performed by: INTERNAL MEDICINE

## 2024-04-12 PROCEDURE — 36415 COLL VENOUS BLD VENIPUNCTURE: CPT

## 2024-04-12 PROCEDURE — 97530 THERAPEUTIC ACTIVITIES: CPT

## 2024-04-12 PROCEDURE — 1200000000 HC SEMI PRIVATE

## 2024-04-12 PROCEDURE — 97116 GAIT TRAINING THERAPY: CPT

## 2024-04-12 PROCEDURE — 97162 PT EVAL MOD COMPLEX 30 MIN: CPT

## 2024-04-12 PROCEDURE — 6370000000 HC RX 637 (ALT 250 FOR IP): Performed by: NURSE PRACTITIONER

## 2024-04-12 PROCEDURE — 84550 ASSAY OF BLOOD/URIC ACID: CPT

## 2024-04-12 PROCEDURE — 94761 N-INVAS EAR/PLS OXIMETRY MLT: CPT

## 2024-04-12 PROCEDURE — 97165 OT EVAL LOW COMPLEX 30 MIN: CPT

## 2024-04-12 PROCEDURE — 82565 ASSAY OF CREATININE: CPT

## 2024-04-12 PROCEDURE — 99223 1ST HOSP IP/OBS HIGH 75: CPT | Performed by: INTERNAL MEDICINE

## 2024-04-12 PROCEDURE — 6360000002 HC RX W HCPCS: Performed by: INTERNAL MEDICINE

## 2024-04-12 PROCEDURE — 94640 AIRWAY INHALATION TREATMENT: CPT

## 2024-04-12 PROCEDURE — 2700000000 HC OXYGEN THERAPY PER DAY

## 2024-04-12 PROCEDURE — 80202 ASSAY OF VANCOMYCIN: CPT

## 2024-04-12 RX ORDER — METRONIDAZOLE 500 MG/1
500 TABLET ORAL EVERY 8 HOURS SCHEDULED
Status: DISCONTINUED | OUTPATIENT
Start: 2024-04-13 | End: 2024-04-13 | Stop reason: HOSPADM

## 2024-04-12 RX ADMIN — SODIUM CHLORIDE, PRESERVATIVE FREE 10 ML: 5 INJECTION INTRAVENOUS at 21:45

## 2024-04-12 RX ADMIN — CEFEPIME 2000 MG: 2 INJECTION, POWDER, FOR SOLUTION INTRAVENOUS at 14:03

## 2024-04-12 RX ADMIN — OXYCODONE AND ACETAMINOPHEN 1 TABLET: 5; 325 TABLET ORAL at 04:09

## 2024-04-12 RX ADMIN — OXYCODONE AND ACETAMINOPHEN 1 TABLET: 5; 325 TABLET ORAL at 19:27

## 2024-04-12 RX ADMIN — INSULIN LISPRO 12 UNITS: 100 INJECTION, SOLUTION INTRAVENOUS; SUBCUTANEOUS at 08:01

## 2024-04-12 RX ADMIN — PREDNISONE 40 MG: 20 TABLET ORAL at 07:57

## 2024-04-12 RX ADMIN — MOMETASONE FUROATE AND FORMOTEROL FUMARATE DIHYDRATE 2 PUFF: 200; 5 AEROSOL RESPIRATORY (INHALATION) at 20:10

## 2024-04-12 RX ADMIN — OXYCODONE AND ACETAMINOPHEN 1 TABLET: 5; 325 TABLET ORAL at 10:27

## 2024-04-12 RX ADMIN — HYDROCHLOROTHIAZIDE 12.5 MG: 25 TABLET ORAL at 07:58

## 2024-04-12 RX ADMIN — CEFEPIME 2000 MG: 2 INJECTION, POWDER, FOR SOLUTION INTRAVENOUS at 00:43

## 2024-04-12 RX ADMIN — INSULIN LISPRO 8 UNITS: 100 INJECTION, SOLUTION INTRAVENOUS; SUBCUTANEOUS at 17:04

## 2024-04-12 RX ADMIN — ACETAMINOPHEN 325MG 650 MG: 325 TABLET ORAL at 13:24

## 2024-04-12 RX ADMIN — IPRATROPIUM BROMIDE AND ALBUTEROL SULFATE 1 DOSE: 2.5; .5 SOLUTION RESPIRATORY (INHALATION) at 20:09

## 2024-04-12 RX ADMIN — ENOXAPARIN SODIUM 40 MG: 100 INJECTION SUBCUTANEOUS at 07:56

## 2024-04-12 RX ADMIN — INSULIN GLARGINE 23 UNITS: 100 INJECTION, SOLUTION SUBCUTANEOUS at 21:45

## 2024-04-12 RX ADMIN — INSULIN LISPRO 12 UNITS: 100 INJECTION, SOLUTION INTRAVENOUS; SUBCUTANEOUS at 13:25

## 2024-04-12 RX ADMIN — LEVOTHYROXINE SODIUM 50 MCG: 0.05 TABLET ORAL at 05:41

## 2024-04-12 RX ADMIN — CARVEDILOL 12.5 MG: 12.5 TABLET, FILM COATED ORAL at 17:04

## 2024-04-12 RX ADMIN — IPRATROPIUM BROMIDE AND ALBUTEROL SULFATE 1 DOSE: 2.5; .5 SOLUTION RESPIRATORY (INHALATION) at 07:22

## 2024-04-12 RX ADMIN — INSULIN LISPRO 2 UNITS: 100 INJECTION, SOLUTION INTRAVENOUS; SUBCUTANEOUS at 07:59

## 2024-04-12 RX ADMIN — INSULIN LISPRO 12 UNITS: 100 INJECTION, SOLUTION INTRAVENOUS; SUBCUTANEOUS at 17:05

## 2024-04-12 RX ADMIN — ATORVASTATIN CALCIUM 40 MG: 40 TABLET, FILM COATED ORAL at 21:45

## 2024-04-12 RX ADMIN — Medication: at 08:57

## 2024-04-12 RX ADMIN — CARVEDILOL 12.5 MG: 12.5 TABLET, FILM COATED ORAL at 07:56

## 2024-04-12 RX ADMIN — INSULIN LISPRO 4 UNITS: 100 INJECTION, SOLUTION INTRAVENOUS; SUBCUTANEOUS at 13:23

## 2024-04-12 RX ADMIN — SODIUM CHLORIDE, PRESERVATIVE FREE 10 ML: 5 INJECTION INTRAVENOUS at 08:00

## 2024-04-12 RX ADMIN — VANCOMYCIN HYDROCHLORIDE 1000 MG: 1 INJECTION, POWDER, LYOPHILIZED, FOR SOLUTION INTRAVENOUS at 04:52

## 2024-04-12 RX ADMIN — MOMETASONE FUROATE AND FORMOTEROL FUMARATE DIHYDRATE 2 PUFF: 200; 5 AEROSOL RESPIRATORY (INHALATION) at 07:22

## 2024-04-12 RX ADMIN — CETIRIZINE HYDROCHLORIDE 5 MG: 10 TABLET, FILM COATED ORAL at 07:57

## 2024-04-12 RX ADMIN — VANCOMYCIN HYDROCHLORIDE 1000 MG: 1 INJECTION, POWDER, LYOPHILIZED, FOR SOLUTION INTRAVENOUS at 14:04

## 2024-04-12 RX ADMIN — IPRATROPIUM BROMIDE AND ALBUTEROL SULFATE 1 DOSE: 2.5; .5 SOLUTION RESPIRATORY (INHALATION) at 10:54

## 2024-04-12 RX ADMIN — LOSARTAN POTASSIUM 50 MG: 50 TABLET, FILM COATED ORAL at 07:58

## 2024-04-12 ASSESSMENT — PAIN DESCRIPTION - LOCATION
LOCATION: KNEE

## 2024-04-12 ASSESSMENT — PAIN DESCRIPTION - ORIENTATION
ORIENTATION: RIGHT;LEFT

## 2024-04-12 ASSESSMENT — PAIN SCALES - GENERAL
PAINLEVEL_OUTOF10: 7
PAINLEVEL_OUTOF10: 9
PAINLEVEL_OUTOF10: 7
PAINLEVEL_OUTOF10: 4
PAINLEVEL_OUTOF10: 3
PAINLEVEL_OUTOF10: 4
PAINLEVEL_OUTOF10: 4

## 2024-04-12 ASSESSMENT — PAIN DESCRIPTION - DESCRIPTORS
DESCRIPTORS: THROBBING;SHARP
DESCRIPTORS: THROBBING
DESCRIPTORS: ACHING;THROBBING
DESCRIPTORS: THROBBING;SHARP

## 2024-04-12 ASSESSMENT — PAIN DESCRIPTION - PAIN TYPE
TYPE: CHRONIC PAIN

## 2024-04-12 ASSESSMENT — PAIN - FUNCTIONAL ASSESSMENT
PAIN_FUNCTIONAL_ASSESSMENT: ACTIVITIES ARE NOT PREVENTED
PAIN_FUNCTIONAL_ASSESSMENT: PREVENTS OR INTERFERES SOME ACTIVE ACTIVITIES AND ADLS
PAIN_FUNCTIONAL_ASSESSMENT: PREVENTS OR INTERFERES SOME ACTIVE ACTIVITIES AND ADLS
PAIN_FUNCTIONAL_ASSESSMENT: ACTIVITIES ARE NOT PREVENTED
PAIN_FUNCTIONAL_ASSESSMENT: PREVENTS OR INTERFERES SOME ACTIVE ACTIVITIES AND ADLS
PAIN_FUNCTIONAL_ASSESSMENT: PREVENTS OR INTERFERES SOME ACTIVE ACTIVITIES AND ADLS

## 2024-04-12 ASSESSMENT — PAIN SCALES - WONG BAKER: WONGBAKER_NUMERICALRESPONSE: NO HURT

## 2024-04-12 ASSESSMENT — PAIN DESCRIPTION - ONSET
ONSET: ON-GOING

## 2024-04-12 ASSESSMENT — PAIN DESCRIPTION - FREQUENCY
FREQUENCY: CONTINUOUS

## 2024-04-12 NOTE — PROGRESS NOTES
This RN informed by BHARGAVI De La O that patient's O2 dropped to 81% while ambulating with therapy. Patient on 1L of O2 via NC. Patient able to recover oxygen saturation to > 90% after sitting for approximately 1 minute.    RN to evaluate patient at the bedside.

## 2024-04-12 NOTE — PROGRESS NOTES
Occupational Therapy  Facility/Department: [address] ORTHOPEDICS  Occupational Therapy Initial Assessment    Name: Lacey Champagne  : 1970  MRN: [id number]  Date of Service: 2024    Discharge Recommendations:  2-3 sessions per week, Patient would benefit from continued therapy after discharge        Lacey Champagne scored a 20/24 on the AM-PAC ADL Inpatient form. Current research shows that an AM-PAC score of 18 or greater is typically associated with a discharge to the patient's home setting. Based on the patient's AM-PAC score, and their current ADL deficits, it is recommended that the patient have 2-3 sessions per week of Occupational Therapy at d/c to increase the patient's independence.  At this time, this patient demonstrates the endurance and safety to discharge home with HHOT (home vs OP services) and a follow up treatment frequency of 2-3x/wk.   Please see assessment section for further patient specific details.    If patient discharges prior to next session this note will serve as a discharge summary.  Please see below for the latest assessment towards goals.     Patient Diagnosis(es): The primary encounter diagnosis was Cellulitis of right foot. Diagnoses of Acute hypoxemic respiratory failure (HCC) and Acute cystitis without hematuria were also pertinent to this visit.  Past Medical History:  has a past medical history of Anemia, Asthma, Cerebral artery occlusion with cerebral infarction (HCC), CHF (congestive heart failure) (HCC), Chronic venous hypertension (idiopathic) with ulcer of bilateral lower extremity (HCC), COPD (chronic obstructive pulmonary disease) (HCC), Diabetes (HCC), Edema, Edema, Foot ulcer (HCC), HTN (hypertension), benign, Hyperlipidemia, PAD (peripheral artery disease) (HCC), Pressure ulcer of right buttock, stage 3 (HCC), and Thyroid disease.  Past Surgical History:  has a past surgical history that includes Tubal ligation; hernia repair; and vascular surgery  Pertinent Hx: 53 yo female admitted with hypoxia and R great toe wound with cellulitis and diabetic ulcer. PMH: COPD, chronic wound ulcers to both legs, CAD, diabetes and hypertension.  Family / Caregiver Present: No  Referring Practitioner: Frantz De León MD  Diagnosis: Hypoxia and R great toe wound/ulcer  Subjective  Subjective: Pt resting in bedside chair upon arrival and agreeable to OT/PT eval. Pt reporting no pain  General Comment  Comments: RN ok to see     Social/Functional History  Social/Functional History  Lives With: Alone  Type of Home: Apartment  Home Layout: One level  Home Access: Stairs to enter with rails  Entrance Stairs - Number of Steps: 20 (10, 10)  Entrance Stairs - Rails: Both  Bathroom Shower/Tub: Shower chair with back, Tub/Shower unit  Bathroom Toilet:  (toilet riser)  Bathroom Equipment: Toilet raiser, Grab bars in shower  Bathroom Accessibility: Walker accessible  Home Equipment: Oxygen (had o2 in the past but does not have right now)  Has the patient had two or more falls in the past year or any fall with injury in the past year?: No  Receives Help From: Family  ADL Assistance: Independent  Homemaking Assistance: Needs assistance (Does everything except laundry)  Laundry: Other (comment) (Daughter)  Homemaking Responsibilities: Yes  Meal Prep Responsibility: Primary  Cleaning Responsibility: Primary  Bill Paying/Finance Responsibility: Primary  Shopping Responsibility: Primary  Health Care Management: Primary  Ambulation Assistance: Independent  Transfer Assistance: Independent  Active : Yes  Mode of Transportation: Other (drives but does not have her own car currently. Her family drives her currently.)  Occupation: Retired  Type of Occupation: Retired, on disability       Objective           Observation/Palpation  Observation: 1 L O2 nasal cannula.  Body Mechanics: Seated lumbar lordosis. Fwd head, thoracic kyphosis.  Edema: Pitting edema on R calf. Hemosiderin staining on B

## 2024-04-12 NOTE — PROGRESS NOTES
Dressing change to right great toe. Medihoney, 2x2, and roll gauze applied per orders. Small amount of serous drainage noted. Removed dressing from left toe, and left opened to air per wound RN recommendation. No drainage noted. Patient tolerated dressing change without complaint.

## 2024-04-12 NOTE — PROGRESS NOTES
Patient refusing chair/ bed alarm at this time. Patient educated on indications for alarm and heightened fall precaution interventions. Patient continues to refuse. Denies further needs. Will continue to monitor and assess

## 2024-04-12 NOTE — PROGRESS NOTES
Clinical Pharmacy Note  Vancomycin Consult    Lacey Champagne is a 54 y.o. female ordered vancomycin for diabetic foot infection; consult received from Dr. Kraus to manage therapy. Also receiving cefepime.    Allergies:  Doxycycline, Ketorolac tromethamine, Morphine and related, Codeine, Pcn [penicillins], and Ciprofloxacin     Temp max:  Temp (24hrs), Av °F (36.7 °C), Min:97.6 °F (36.4 °C), Max:98.4 °F (36.9 °C)      Recent Labs     04/10/24  0502   WBC 6.9       Recent Labs     04/10/24  0502 24  1145   BUN 36*  --    CREATININE 0.8 0.6         Intake/Output Summary (Last 24 hours) at 2024 1435  Last data filed at 2024 1409  Gross per 24 hour   Intake 1476 ml   Output --   Net 1476 ml       Culture Results:  pending    Ht Readings from Last 1 Encounters:   24 1.6 m (5' 3\")        Wt Readings from Last 1 Encounters:   24 98.1 kg (216 lb 4.3 oz)         Estimated Creatinine Clearance: 117 mL/min (based on SCr of 0.6 mg/dL).    Assessment:  Day # 4 of vancomycin.  Current regimen: 1000 mg every 12 hours  Vancomycin level: 17.6 mg/L (~8 hours after dose)  Predicted AUC: 448  Renal function stable    Plan:  Continue current regimen.  Will obtain next level on  (after 4 more doses of regimen)    Thank you for the consult.   Cecilia Wang, SethD, BCPS  2024 2:37 PM

## 2024-04-12 NOTE — PLAN OF CARE
Problem: Discharge Planning  Goal: Discharge to home or other facility with appropriate resources  Outcome: Progressing  Flowsheets (Taken 4/12/2024 0309)  Discharge to home or other facility with appropriate resources:   Identify barriers to discharge with patient and caregiver   Arrange for needed discharge resources and transportation as appropriate     Problem: Pain  Goal: Verbalizes/displays adequate comfort level or baseline comfort level  Outcome: Progressing  Flowsheets (Taken 4/12/2024 0309)  Verbalizes/displays adequate comfort level or baseline comfort level:   Encourage patient to monitor pain and request assistance   Assess pain using appropriate pain scale   Administer analgesics based on type and severity of pain and evaluate response   Implement non-pharmacological measures as appropriate and evaluate response     Problem: Safety - Adult  Goal: Free from fall injury  Outcome: Progressing  Flowsheets (Taken 4/12/2024 0309)  Free From Fall Injury: Instruct family/caregiver on patient safety     Problem: Chronic Conditions and Co-morbidities  Goal: Patient's chronic conditions and co-morbidity symptoms are monitored and maintained or improved  Outcome: Progressing  Flowsheets (Taken 4/12/2024 0309)  Care Plan - Patient's Chronic Conditions and Co-Morbidity Symptoms are Monitored and Maintained or Improved:   Monitor and assess patient's chronic conditions and comorbid symptoms for stability, deterioration, or improvement   Collaborate with multidisciplinary team to address chronic and comorbid conditions and prevent exacerbation or deterioration

## 2024-04-12 NOTE — PROGRESS NOTES
Physical Therapy  Facility/Department: [address] ORTHOPEDICS  Physical Therapy Initial Assessment  Co Treat with OT  Daily Treatment Note    This note serves as patient discharge summary if pt discharges prior to next PT visit    Name: Lacey Champagne  : 1970  MRN: [id number]  Date of Service: 2024    Discharge Recommendations:  Home with Home health PT, Patient would benefit from continued therapy after discharge (2-3, consider future OP PT for balance and cardiovascular endurance.)   PT Equipment Recommendations  Equipment Needed: No    Lacey Champagne scored a 23/24 on the AM-PAC short mobility form. Current research shows that an AM-PAC score of 18 or greater is typically associated with a discharge to the patient's home setting. Based on the patient's AM-PAC score and their current functional mobility deficits, it is recommended that the patient have 2-3 sessions per week of Physical Therapy at d/c to increase the patient's independence.  At this time, this patient demonstrates the endurance and safety to discharge home with HH PT and a follow up treatment frequency of 2-3x/wk.  Please see assessment section for further patient specific details.    Patient Diagnosis(es): The primary encounter diagnosis was Cellulitis of right foot. Diagnoses of Acute hypoxemic respiratory failure (HCC) and Acute cystitis without hematuria were also pertinent to this visit.  Past Medical History:  has a past medical history of Anemia, Asthma, Cerebral artery occlusion with cerebral infarction (HCC), CHF (congestive heart failure) (HCC), Chronic venous hypertension (idiopathic) with ulcer of bilateral lower extremity (HCC), COPD (chronic obstructive pulmonary disease) (HCC), Diabetes (HCC), Edema, Edema, Foot ulcer (HCC), HTN (hypertension), benign, Hyperlipidemia, PAD (peripheral artery disease) (HCC), Pressure ulcer of right buttock, stage 3 (HCC), and Thyroid disease.  Past Surgical History:  has a past

## 2024-04-12 NOTE — PROGRESS NOTES
Patient resting in chair. Antibiotics infusing. Patient complaints of no further needs at this time.     Patient denies physical/emotional needs at this time. Call light, telephone, and bed side table are within reach. Fall precautions in place. Will continue to monitor and assess.      Electronically signed by Kelly Hoover on 4/12/2024 at 6:52 PM

## 2024-04-12 NOTE — PROGRESS NOTES
Pt is alert and oriented x4, resting quietly in bed. Nightly medications and intake tolerated well. No complaints of nausea or vomiting at this time. No other needs made known at this time. Fall precautions in place. Call light within reach. Will continue to monitor.    Electronically signed by Viraj Keller RN on 4/12/2024 at 3:09 AM

## 2024-04-12 NOTE — CONSULTS
0115Culture, Blood 2No Growth to date.  Any change in status will be called.Narrative:  ORDER#: [id number]                          ORDERED BY: JAMMIE GARNETT  SOURCE: Blood No site given                COLLECTED:  04/09/24 00:30  ANTIBIOTICS AT BRAN.:                      RECEIVED :  04/09/24 08:12  If child <=2 yrs old please draw pediatric bottle.~Blood Culture #2    Blood Culture:   Lab Results   Component Value Date/Time    BC  04/09/2024 12:15 AM     No Growth to date.  Any change in status will be called.    BLOODCULT2  04/09/2024 12:30 AM     No Growth to date.  Any change in status will be called.       Viral Culture:    No results found for: \"COVID19\"  Urine Culture: No results for input(s): \"LABURIN\" in the last 72 hours.    Scheduled Meds:   vancomycin  1,000 mg IntraVENous Q12H    insulin glargine  23 Units SubCUTAneous Nightly    insulin lispro  12 Units SubCUTAneous TID WC    sodium chloride flush  5-40 mL IntraVENous 2 times per day    enoxaparin  40 mg SubCUTAneous Daily    ipratropium 0.5 mg-albuterol 2.5 mg  1 Dose Inhalation Q4H WA RT    atorvastatin  40 mg Oral Nightly    carvedilol  12.5 mg Oral BID WC    levothyroxine  50 mcg Oral QAM AC    cefepime  2,000 mg IntraVENous Q12H    insulin lispro  0-8 Units SubCUTAneous TID WC    insulin lispro  0-4 Units SubCUTAneous Nightly    predniSONE  40 mg Oral Daily    vancomycin (VANCOCIN) intermittent dosing (placeholder)   Other RX Placeholder    mometasone-formoterol  2 puff Inhalation BID RT    medihoney wound/burn dressing   Topical Daily    cetirizine  5 mg Oral Daily    losartan  50 mg Oral Daily    And    hydroCHLOROthiazide  12.5 mg Oral Daily       Continuous Infusions:   sodium chloride      dextrose         PRN Meds:  sodium chloride flush, sodium chloride, ondansetron **OR** ondansetron, polyethylene glycol, acetaminophen **OR** acetaminophen, glucose, dextrose bolus **OR** dextrose bolus, glucagon (rDNA), dextrose,  right foot negative for osteomyelitis  Multiple antibiotic allergies      Labs, Microbiology, Radiology and pertinent results from current hospitalization and care every where were reviewed by me as a part of the consultation.    PLAN :  Cont IV Cefepime  x 2 gm Q 12 HR  D/c IV Vancomycin   Cont local care  xRAY negative  For antibiotic allergies noted  Home-going we will choose Cephalexin x 500 mg Q  6 hr x 10 days  Add Oral Flagyl x 500 mg Q 8 hr for anaerobes x 5 days  Ok for d/c planning once the foot is doing better  Needs Podiatry follow up      Discussed with patient/Family and Nursing     Medical Decision Making:  The following items were considered in medical decision making:  Discussion of patient care with other providers  Reviewed clinical lab tests  Reviewed radiology tests  Reviewed other diagnostic tests/interventions  Independent review of radiologic images  Independent review of  Microbiology cultures and other micro tests reviewed     Risk of Complications/Morbidity: High      Illness(es)/ Infection present that pose threat to bodily function.   There is potential for severe exacerbation of infection/side effects of treatment.  Therapy requires intensive monitoring for antimicrobial agent toxicity.     Thanks for allowing me to participate in your patient's care please call me with any questions or concerns.    Dr. Claudy Rg MD  Infectious Disease  [facility] Physician  Phone: [phone]   Fax : [phone]

## 2024-04-12 NOTE — PROGRESS NOTES
V2.0    Patient:  Lacey Champagne 54 y.o. female MRN: [id number]     Date of Service: 4/12/2024    Disposition :  DC in 1 day  Assessment and plan :    Lacey Champagne is a 54 y.o. female who presents with COPD exacerbation (HCC)        Plan:   Diabetic foot infection, less likely to be osteomyelitis, IV vancomycin and cefepime, podiatry consulted, consulted ID continue on IV antibiotics.  ESR and CRP within normal limits, continue with wound care.,  Added uric acid levels  Diabetes mellitus uncontrolled likely due to steroids, continue with insulin sliding scale.  Possible COPD exacerbation, even though patient did not have complaints, found to be hypoxic with minimal cough steroids DuoNeb pulmonology consulted likely will need oxygen on discharge resolving, continue with breathing treatment, prednisone last dose 04/13/2024, home O2 evaluation close for discharge most likely in the a.m., PT OT ordered  Acute on chronic respiratory failure with hypoxia, with oxygen saturation significantly less than 90% with some cough started on oxygen keep saturation of 90%, does not use any oxygen at home currently requiring 3 L, keep sats 88 to 92%, home O2 eval  Peripheral vascular disease follow-up with outpatient        Diet ADULT DIET; Regular; 4 carb choices (60 gm/meal)   DVT Prophylaxis [] Lovenox, []  Heparin, [x] SCDs, [] Ambulation,  [] Eliquis, [] Xarelto   Code Status Full Code   Disposition From: Home  Expected Disposition: Home/ ECF to be decided  Estimated Date of Discharge: tomorrow     Surrogate Decision Maker/ POA           Barrier for Discharge:      Heparin drip    Cardizem drip/Amiodarone drip    Pressors gtt    Protonix gtt    IV Lasix/Bumex    Pending Consult Eval    Pending Imaging/CT/MRI/Nuclear scan/Stress Test    IV Abx      Pending Cultures    Requiring BIPAP    Intubated/Mechanical Ventilation    Pending Placement    Pending Safe discharge plan    Pending Ongoing evaluation and improvement     Pending Clearance by Consult Service for discharge           Subjective:     Patient seen and examined this morning seen and examined this morning, sitting in the chair, no acute overnight events, pain seems to be controlled, no fever.      Objective:     Intake/Output Summary (Last 24 hours) at 4/12/2024 1319  Last data filed at 4/12/2024 0934  Gross per 24 hour   Intake 1254 ml   Output --   Net 1254 ml      Vitals:   Vitals:    04/12/24 1015 04/12/24 1055 04/12/24 1057 04/12/24 1130   BP: (!) 183/78   (!) 135/96   Pulse: 83   71   Resp: 20  20    Temp: 97.8 °F (36.6 °C)      TempSrc: Oral      SpO2: 91% 91%  91%   Weight:       Height:             Physical Exam:    General: NAD   Eyes: EOMI  ENT: neck supple  Cardiovascular: Regular rate.  Respiratory: Clear to auscultation  Gastrointestinal: Soft, non tender  Genitourinary: no suprapubic tenderness  Musculoskeletal: Foot ulcer bilateral  Skin: warm, dry  Neuro: Alert.  Psych: Mood appropriate.         Medications:   Medications:    vancomycin  1,000 mg IntraVENous Q12H    insulin glargine  23 Units SubCUTAneous Nightly    insulin lispro  12 Units SubCUTAneous TID WC    sodium chloride flush  5-40 mL IntraVENous 2 times per day    enoxaparin  40 mg SubCUTAneous Daily    ipratropium 0.5 mg-albuterol 2.5 mg  1 Dose Inhalation Q4H WA RT    atorvastatin  40 mg Oral Nightly    carvedilol  12.5 mg Oral BID WC    levothyroxine  50 mcg Oral QAM AC    cefepime  2,000 mg IntraVENous Q12H    insulin lispro  0-8 Units SubCUTAneous TID WC    insulin lispro  0-4 Units SubCUTAneous Nightly    predniSONE  40 mg Oral Daily    vancomycin (VANCOCIN) intermittent dosing (placeholder)   Other RX Placeholder    mometasone-formoterol  2 puff Inhalation BID RT    medihoney wound/burn dressing   Topical Daily    cetirizine  5 mg Oral Daily    losartan  50 mg Oral Daily    And    hydroCHLOROthiazide  12.5 mg Oral Daily      Infusions:    sodium chloride      dextrose       PRN Meds:

## 2024-04-12 NOTE — PLAN OF CARE
Problem: Discharge Planning  Goal: Discharge to home or other facility with appropriate resources  4/12/2024 0819 by Kelly Hoover  Outcome: Progressing  Flowsheets (Taken 4/12/2024 0819)  Discharge to home or other facility with appropriate resources:   Identify barriers to discharge with patient and caregiver   Arrange for needed discharge resources and transportation as appropriate   Identify discharge learning needs (meds, wound care, etc)  4/12/2024 0309 by Viraj Keller, RN  Outcome: Progressing  Flowsheets (Taken 4/12/2024 0309)  Discharge to home or other facility with appropriate resources:   Identify barriers to discharge with patient and caregiver   Arrange for needed discharge resources and transportation as appropriate     Problem: Pain  Goal: Verbalizes/displays adequate comfort level or baseline comfort level  4/12/2024 0819 by Kelly Hoover  Outcome: Progressing  Flowsheets (Taken 4/12/2024 0819)  Verbalizes/displays adequate comfort level or baseline comfort level:   Encourage patient to monitor pain and request assistance   Assess pain using appropriate pain scale   Administer analgesics based on type and severity of pain and evaluate response   Implement non-pharmacological measures as appropriate and evaluate response  4/12/2024 0309 by Viraj Keller, RN  Outcome: Progressing  Flowsheets (Taken 4/12/2024 0309)  Verbalizes/displays adequate comfort level or baseline comfort level:   Encourage patient to monitor pain and request assistance   Assess pain using appropriate pain scale   Administer analgesics based on type and severity of pain and evaluate response   Implement non-pharmacological measures as appropriate and evaluate response     Problem: Safety - Adult  Goal: Free from fall injury  4/12/2024 0819 by Kelly Hoover  Outcome: Progressing  Flowsheets (Taken 4/12/2024 0309 by Viraj Keller, RN)  Free From Fall Injury: Instruct family/caregiver on patient safety  4/12/2024 0309 by  Viraj Keller, RN  Outcome: Progressing  Flowsheets (Taken 4/12/2024 0309)  Free From Fall Injury: Instruct family/caregiver on patient safety     Problem: Chronic Conditions and Co-morbidities  Goal: Patient's chronic conditions and co-morbidity symptoms are monitored and maintained or improved  4/12/2024 0819 by Kelly Hoover  Outcome: Progressing  Flowsheets (Taken 4/12/2024 0819)  Care Plan - Patient's Chronic Conditions and Co-Morbidity Symptoms are Monitored and Maintained or Improved:   Monitor and assess patient's chronic conditions and comorbid symptoms for stability, deterioration, or improvement   Collaborate with multidisciplinary team to address chronic and comorbid conditions and prevent exacerbation or deterioration   Update acute care plan with appropriate goals if chronic or comorbid symptoms are exacerbated and prevent overall improvement and discharge  4/12/2024 0309 by Viraj Keller, RN  Outcome: Progressing  Flowsheets (Taken 4/12/2024 0309)  Care Plan - Patient's Chronic Conditions and Co-Morbidity Symptoms are Monitored and Maintained or Improved:   Monitor and assess patient's chronic conditions and comorbid symptoms for stability, deterioration, or improvement   Collaborate with multidisciplinary team to address chronic and comorbid conditions and prevent exacerbation or deterioration

## 2024-04-12 NOTE — PROGRESS NOTES
Patient awake and resting this morning. Patient ambulated from bed to chair to eat breakfast. Patient morning medications administered. See eMAR for medication administration. Patient IV flushed without resistance, dressing appears to be clean, dry, and intact. Patient deferred assessment to later this morning so she could eat breakfast. Will reassess.     Patient denies physical/emotional needs at this time. Call light, telephone, and bed side table are within reach. Fall precautions in place.     Electronically signed by Kelly Hoover on 4/12/2024 at 8:18 AM

## 2024-04-13 VITALS
OXYGEN SATURATION: 94 % | DIASTOLIC BLOOD PRESSURE: 72 MMHG | HEIGHT: 63 IN | WEIGHT: 216.05 LBS | HEART RATE: 73 BPM | BODY MASS INDEX: 38.28 KG/M2 | SYSTOLIC BLOOD PRESSURE: 188 MMHG | TEMPERATURE: 97.8 F | RESPIRATION RATE: 16 BRPM

## 2024-04-13 LAB
ANION GAP SERPL CALCULATED.3IONS-SCNC: 4 MMOL/L (ref 3–16)
BACTERIA BLD CULT ORG #2: NORMAL
BACTERIA BLD CULT: NORMAL
BUN SERPL-MCNC: 35 MG/DL (ref 7–20)
CALCIUM SERPL-MCNC: 9.4 MG/DL (ref 8.3–10.6)
CHLORIDE SERPL-SCNC: 99 MMOL/L (ref 99–110)
CO2 SERPL-SCNC: 35 MMOL/L (ref 21–32)
CREAT SERPL-MCNC: 0.7 MG/DL (ref 0.6–1.1)
DEPRECATED RDW RBC AUTO: 16.4 % (ref 12.4–15.4)
GFR SERPLBLD CREATININE-BSD FMLA CKD-EPI: >90 ML/MIN/{1.73_M2}
GLUCOSE BLD-MCNC: 138 MG/DL (ref 70–99)
GLUCOSE BLD-MCNC: 160 MG/DL (ref 70–99)
GLUCOSE BLD-MCNC: 220 MG/DL (ref 70–99)
GLUCOSE SERPL-MCNC: 197 MG/DL (ref 70–99)
HCT VFR BLD AUTO: 50.4 % (ref 36–48)
HGB BLD-MCNC: 15.7 G/DL (ref 12–16)
MCH RBC QN AUTO: 25.7 PG (ref 26–34)
MCHC RBC AUTO-ENTMCNC: 31.2 G/DL (ref 31–36)
MCV RBC AUTO: 82.4 FL (ref 80–100)
PERFORMED ON: ABNORMAL
PLATELET # BLD AUTO: 115 K/UL (ref 135–450)
PMV BLD AUTO: 7.5 FL (ref 5–10.5)
POTASSIUM SERPL-SCNC: 5 MMOL/L (ref 3.5–5.1)
RBC # BLD AUTO: 6.11 M/UL (ref 4–5.2)
SODIUM SERPL-SCNC: 138 MMOL/L (ref 136–145)
WBC # BLD AUTO: 5.8 K/UL (ref 4–11)

## 2024-04-13 PROCEDURE — 2700000000 HC OXYGEN THERAPY PER DAY

## 2024-04-13 PROCEDURE — 6370000000 HC RX 637 (ALT 250 FOR IP): Performed by: INTERNAL MEDICINE

## 2024-04-13 PROCEDURE — 36415 COLL VENOUS BLD VENIPUNCTURE: CPT

## 2024-04-13 PROCEDURE — 94640 AIRWAY INHALATION TREATMENT: CPT

## 2024-04-13 PROCEDURE — 94680 O2 UPTK RST&XERS DIR SIMPLE: CPT

## 2024-04-13 PROCEDURE — 6360000002 HC RX W HCPCS: Performed by: INTERNAL MEDICINE

## 2024-04-13 PROCEDURE — 2580000003 HC RX 258: Performed by: INTERNAL MEDICINE

## 2024-04-13 PROCEDURE — 94761 N-INVAS EAR/PLS OXIMETRY MLT: CPT

## 2024-04-13 PROCEDURE — 80048 BASIC METABOLIC PNL TOTAL CA: CPT

## 2024-04-13 PROCEDURE — 85027 COMPLETE CBC AUTOMATED: CPT

## 2024-04-13 PROCEDURE — 6370000000 HC RX 637 (ALT 250 FOR IP): Performed by: NURSE PRACTITIONER

## 2024-04-13 RX ORDER — OXYCODONE HYDROCHLORIDE AND ACETAMINOPHEN 5; 325 MG/1; MG/1
1 TABLET ORAL EVERY 6 HOURS PRN
Qty: 12 TABLET | Refills: 0 | Status: SHIPPED | OUTPATIENT
Start: 2024-04-13 | End: 2024-04-16

## 2024-04-13 RX ORDER — CEPHALEXIN 500 MG/1
500 CAPSULE ORAL 4 TIMES DAILY
Qty: 40 CAPSULE | Refills: 0 | Status: SHIPPED | OUTPATIENT
Start: 2024-04-13 | End: 2024-04-23

## 2024-04-13 RX ORDER — METRONIDAZOLE 500 MG/1
500 TABLET ORAL EVERY 8 HOURS SCHEDULED
Qty: 14 TABLET | Refills: 0 | Status: SHIPPED | OUTPATIENT
Start: 2024-04-13 | End: 2024-04-18

## 2024-04-13 RX ADMIN — INSULIN LISPRO 12 UNITS: 100 INJECTION, SOLUTION INTRAVENOUS; SUBCUTANEOUS at 16:24

## 2024-04-13 RX ADMIN — METRONIDAZOLE 500 MG: 500 TABLET ORAL at 14:43

## 2024-04-13 RX ADMIN — HYDROCHLOROTHIAZIDE 12.5 MG: 25 TABLET ORAL at 08:21

## 2024-04-13 RX ADMIN — LOSARTAN POTASSIUM 50 MG: 50 TABLET, FILM COATED ORAL at 08:21

## 2024-04-13 RX ADMIN — CARVEDILOL 12.5 MG: 12.5 TABLET, FILM COATED ORAL at 16:26

## 2024-04-13 RX ADMIN — MOMETASONE FUROATE AND FORMOTEROL FUMARATE DIHYDRATE 2 PUFF: 200; 5 AEROSOL RESPIRATORY (INHALATION) at 07:56

## 2024-04-13 RX ADMIN — IPRATROPIUM BROMIDE AND ALBUTEROL SULFATE 1 DOSE: 2.5; .5 SOLUTION RESPIRATORY (INHALATION) at 15:44

## 2024-04-13 RX ADMIN — ENOXAPARIN SODIUM 40 MG: 100 INJECTION SUBCUTANEOUS at 08:21

## 2024-04-13 RX ADMIN — IPRATROPIUM BROMIDE AND ALBUTEROL SULFATE 1 DOSE: 2.5; .5 SOLUTION RESPIRATORY (INHALATION) at 07:46

## 2024-04-13 RX ADMIN — CARVEDILOL 12.5 MG: 12.5 TABLET, FILM COATED ORAL at 08:21

## 2024-04-13 RX ADMIN — LEVOTHYROXINE SODIUM 50 MCG: 0.05 TABLET ORAL at 05:54

## 2024-04-13 RX ADMIN — CEFEPIME 2000 MG: 2 INJECTION, POWDER, FOR SOLUTION INTRAVENOUS at 12:19

## 2024-04-13 RX ADMIN — OXYCODONE AND ACETAMINOPHEN 1 TABLET: 5; 325 TABLET ORAL at 18:47

## 2024-04-13 RX ADMIN — PREDNISONE 40 MG: 20 TABLET ORAL at 08:21

## 2024-04-13 RX ADMIN — INSULIN LISPRO 12 UNITS: 100 INJECTION, SOLUTION INTRAVENOUS; SUBCUTANEOUS at 08:20

## 2024-04-13 RX ADMIN — CEFEPIME 2000 MG: 2 INJECTION, POWDER, FOR SOLUTION INTRAVENOUS at 00:30

## 2024-04-13 RX ADMIN — METRONIDAZOLE 500 MG: 500 TABLET ORAL at 08:23

## 2024-04-13 RX ADMIN — OXYCODONE AND ACETAMINOPHEN 1 TABLET: 5; 325 TABLET ORAL at 12:42

## 2024-04-13 RX ADMIN — SODIUM CHLORIDE, PRESERVATIVE FREE 10 ML: 5 INJECTION INTRAVENOUS at 08:21

## 2024-04-13 RX ADMIN — OXYCODONE AND ACETAMINOPHEN 1 TABLET: 5; 325 TABLET ORAL at 06:31

## 2024-04-13 RX ADMIN — INSULIN LISPRO 12 UNITS: 100 INJECTION, SOLUTION INTRAVENOUS; SUBCUTANEOUS at 12:17

## 2024-04-13 RX ADMIN — CETIRIZINE HYDROCHLORIDE 5 MG: 10 TABLET, FILM COATED ORAL at 08:21

## 2024-04-13 RX ADMIN — INSULIN LISPRO 2 UNITS: 100 INJECTION, SOLUTION INTRAVENOUS; SUBCUTANEOUS at 16:25

## 2024-04-13 RX ADMIN — ACETAMINOPHEN 325MG 650 MG: 325 TABLET ORAL at 02:57

## 2024-04-13 RX ADMIN — IPRATROPIUM BROMIDE AND ALBUTEROL SULFATE 1 DOSE: 2.5; .5 SOLUTION RESPIRATORY (INHALATION) at 11:06

## 2024-04-13 RX ADMIN — Medication: at 08:21

## 2024-04-13 RX ADMIN — OXYCODONE AND ACETAMINOPHEN 1 TABLET: 5; 325 TABLET ORAL at 00:28

## 2024-04-13 ASSESSMENT — PAIN DESCRIPTION - ONSET
ONSET: ON-GOING
ONSET: ON-GOING

## 2024-04-13 ASSESSMENT — PAIN DESCRIPTION - LOCATION
LOCATION: KNEE
LOCATION: LEG
LOCATION: KNEE

## 2024-04-13 ASSESSMENT — PAIN DESCRIPTION - ORIENTATION
ORIENTATION: RIGHT;LEFT

## 2024-04-13 ASSESSMENT — PAIN SCALES - GENERAL
PAINLEVEL_OUTOF10: 8
PAINLEVEL_OUTOF10: 7
PAINLEVEL_OUTOF10: 8
PAINLEVEL_OUTOF10: 4
PAINLEVEL_OUTOF10: 3
PAINLEVEL_OUTOF10: 8
PAINLEVEL_OUTOF10: 5

## 2024-04-13 ASSESSMENT — PAIN DESCRIPTION - DESCRIPTORS
DESCRIPTORS: ACHING
DESCRIPTORS: ACHING
DESCRIPTORS: ACHING;DISCOMFORT
DESCRIPTORS: ACHING;DISCOMFORT
DESCRIPTORS: ACHING

## 2024-04-13 ASSESSMENT — PAIN DESCRIPTION - FREQUENCY
FREQUENCY: CONTINUOUS
FREQUENCY: CONTINUOUS

## 2024-04-13 ASSESSMENT — PAIN - FUNCTIONAL ASSESSMENT
PAIN_FUNCTIONAL_ASSESSMENT: ACTIVITIES ARE NOT PREVENTED
PAIN_FUNCTIONAL_ASSESSMENT: PREVENTS OR INTERFERES SOME ACTIVE ACTIVITIES AND ADLS
PAIN_FUNCTIONAL_ASSESSMENT: PREVENTS OR INTERFERES SOME ACTIVE ACTIVITIES AND ADLS

## 2024-04-13 ASSESSMENT — PAIN SCALES - WONG BAKER: WONGBAKER_NUMERICALRESPONSE: NO HURT

## 2024-04-13 ASSESSMENT — PAIN DESCRIPTION - PAIN TYPE
TYPE: CHRONIC PAIN
TYPE: CHRONIC PAIN

## 2024-04-13 NOTE — PROGRESS NOTES
[facility]    Respiratory Therapy   Home Oxygen Evaluation        Name: Lacey Champagne  Medical Record Number: [id number]  Age: 54 y.o.  Gender:  female   : 1970  Today's date: 2024  Room: M4T-6218/3106-01      Assessment        BP (!) 188/72   Pulse 73   Temp 97.8 °F (36.6 °C) (Oral)   Resp 14   Ht 1.6 m (5' 3\")   Wt 98 kg (216 lb 0.8 oz)   SpO2 94%   BMI 38.27 kg/m²     Patient Active Problem List   Diagnosis    Vision disturbance following cerebrovascular accident    DMII (diabetes mellitus, type 2) (HCC)    HTN (hypertension), benign    Cellulitis of buttock    Cellulitis and abscess of buttock    Pressure ulcer of right buttock, stage 3 (HCC)    Skin ulcer of left lower leg, limited to breakdown of skin (HCC)    Skin ulcer of right lower leg, limited to breakdown of skin (HCC)    Diabetic ulcer of toe of left foot associated with type 2 diabetes mellitus, with fat layer exposed (HCC)    Leg swelling    PAD (peripheral artery disease) (HCC)    Chronic venous hypertension (idiopathic) with ulcer of bilateral lower extremity (HCC)    Edema    Localized edema    Metabolic encephalopathy    Acute hypoxemic respiratory failure (HCC)    COPD exacerbation (HCC)    Chronic respiratory failure with hypercapnia (HCC)    Cellulitis of right foot    Morbid obesity due to excess calories (HCC)    Skin ulcer of toe of right foot with fat layer exposed (HCC)    Type 2 diabetes mellitus with right diabetic foot infection (HCC)       Social History:  Social History     Tobacco Use    Smoking status: Former     Current packs/day: 0.00     Average packs/day: 1 pack/day for 25.0 years (25.0 ttl pk-yrs)     Types: Cigarettes     Start date:      Quit date: 2017     Years since quittin.2    Smokeless tobacco: Never    Tobacco comments:     quit 3 weeks ago   Vaping Use    Vaping Use: Never used   Substance Use Topics    Alcohol use: No    Drug use: No       Patient Room Air

## 2024-04-13 NOTE — PLAN OF CARE
Pt ok for discharge per MD. Discharge instructions reviewed. Patient voiced understanding. IV removed.  No questions or concerns at this time. Patient discharged with all belongings.

## 2024-04-13 NOTE — CARE COORDINATION
Messaged MD that patient qualified for home oxygen. Requested he place DME oxygen order. Once DME order placed will call [employer] for set up & have RT deliver tank to bedside.    Electronically signed by Chuyita Arnold RN Case Management on 4/13/2024 at 12:56 PM

## 2024-04-13 NOTE — PLAN OF CARE
Problem: Discharge Planning  Goal: Discharge to home or other facility with appropriate resources  4/13/2024 1136 by Addis Barcenas RN  Outcome: Progressing  4/12/2024 2305 by Bertha Fofana RN  Outcome: Progressing  Flowsheets  Taken 4/12/2024 2305  Discharge to home or other facility with appropriate resources:   Identify barriers to discharge with patient and caregiver   Arrange for needed discharge resources and transportation as appropriate   Identify discharge learning needs (meds, wound care, etc)  Taken 4/12/2024 2110  Discharge to home or other facility with appropriate resources:   Identify barriers to discharge with patient and caregiver   Arrange for needed discharge resources and transportation as appropriate   Identify discharge learning needs (meds, wound care, etc)     Problem: Pain  Goal: Verbalizes/displays adequate comfort level or baseline comfort level  4/13/2024 1136 by Addis aBrcenas RN  Outcome: Progressing  4/12/2024 2305 by Bertha Fofana RN  Outcome: Progressing  Flowsheets (Taken 4/12/2024 0819 by Kelly Hoover)  Verbalizes/displays adequate comfort level or baseline comfort level:   Encourage patient to monitor pain and request assistance   Assess pain using appropriate pain scale   Administer analgesics based on type and severity of pain and evaluate response   Implement non-pharmacological measures as appropriate and evaluate response     Problem: Safety - Adult  Goal: Free from fall injury  4/13/2024 1136 by Addis Barcenas RN  Outcome: Progressing  4/12/2024 2305 by Bertha Fofana RN  Outcome: Progressing  Flowsheets  Taken 4/12/2024 2305  Free From Fall Injury:   Instruct family/caregiver on patient safety   Based on caregiver fall risk screen, instruct family/caregiver to ask for assistance with transferring infant if caregiver noted to have fall risk factors  Taken 4/12/2024 2252  Free From Fall Injury:   Instruct family/caregiver on patient safety   Based on caregiver

## 2024-04-13 NOTE — DISCHARGE SUMMARY
V2.0  Discharge Summary    Name:  Lacey Champagne /Age/Sex: 1970 (54 y.o. female)   Admit Date: 2024  Discharge Date: 24    MRN & CSN:  [id number] & [id number] Encounter Date and Time 24 12:34 PM EDT    Attending:  Frantz De León MD Discharging Provider: Frantz De León MD       Hospital Course:     Patient with history of COPD presented to the hospital with diabetic foot infection less likely to be osteomyelitis, was kept on antibiotics, ID consulted, x-ray with no significance/convincing evidence of osteomyelitis, ESR and CRP within normal limits, wound care was provided, podiatry was consulted, ID on board, patient discharged home on Flagyl and cephalexin, was thought to be in possible COPD exacerbation for which pulmonary was involved, prednisone was started and course completed, will be discharged home on oxygen, patient is much better and admits to being back to baseline and is ready to be discharged.    The patient expressed appropriate understanding of, and agreement with the discharge recommendations, medications, and plan.     Consults this admission:  PHARMACY TO DOSE VANCOMYCIN  IP CONSULT TO SPIRITUAL SERVICES  IP CONSULT TO PODIATRY  IP CONSULT TO PULMONOLOGY  IP CONSULT TO INFECTIOUS DISEASES    Discharge Diagnosis:   COPD exacerbation (HCC)        Discharge Instruction:   Follow up appointments: pcp  Primary care physician: [facility], [facility] within 2 weeks  Diet: regular diet   Activity: activity as tolerated  Disposition: Discharged to:   [x]Home, [][facility], []SNF, []Acute Rehab, []Hospice   Condition on discharge: Stable  Labs and Tests to be Followed up as an outpatient by PCP or Specialist: none    Discharge Medications:        Medication List        START taking these medications      cephALEXin 500 MG capsule  Commonly known as: KEFLEX  Take 1 capsule by mouth 4 times daily for 10 days     medihoney wound/burn dressing Gel gel  Apply 1 each topically daily  Start taking on:

## 2024-04-13 NOTE — PROGRESS NOTES
PT AAO x4. Pt remain on 1.5 L of oxygen per nasal cannula. Pt tolerating diet VSS with slightly high b/p.. better with rest and pain control. Chronic pain to bilateral knees. Encouraged to elevate heel. Pt refusing.  Pt awake most of shift.  Pt tolerating diet.No further needs voiced at this time. .Fall precautions in place.  Call light within reach. Will continue to monitor.   Electronically signed by Bertha Fofana RN on 4/13/2024 at 5:34 AM

## 2024-04-13 NOTE — CARE COORDINATION
Case Management Discharge Note          Date / Time of Note: 4/13/2024 1:28 PM                  Patient Name: Lacey Champagne   YOB: 1970  Diagnosis: COPD exacerbation (HCC) [J44.1]  Cellulitis of right foot [L03.115]  Acute cystitis without hematuria [N30.00]  Acute hypoxemic respiratory failure (HCC) [J96.01]   Date / Time: 4/8/2024 11:53 PM    Financial:  Payor: [facility] / Plan: [facility] MEDICAID / Product Type: *No Product type* /      Pharmacy:    [id number] [place], [place] - [address]  - P [phone] - F [phone]  [address] [facility] [address]  Phone: [phone] Fax: [phone]    [facility] [id number] [place], [place] - [address] - P [phone] - F [phone]  [address] [address]  Phone: [phone] Fax: [phone]      Assistance purchasing medications?: Potential Assistance Purchasing Medications: No  Assistance provided by Case Management: None at this time    DISCHARGE Disposition: Home- No Services Needed    Durable Medical Equipment:  DME Provider: [employer]  Equipment obtained during hospitalization: oxygen    Home Oxygen and Respiratory Equipment:  Oxygen needed at discharge?: Yes  Home Oxygen Company: [employer] Phone: [phone]   Portable tank available for discharge?: Yes    Transportation:  Transportation PLAN for discharge: family   Mode of Transport: Private Car  Time of Transport: TBD    IMM Completed:   Not Indicated    Additional CM Notes: Patient will return home. Denies home needs. Spoke to Vesna bernardo/ [employer] to dispense pull behind concentrator & [employer] will follow up with patient Monday for further equipment delivery to home. Liat ESCALANTE made aware and will deliver concentrator as soon as able.    The Plan for Transition of Care is related to the following treatment goals of COPD exacerbation (HCC) [J44.1]  Cellulitis of right foot [L03.115]  Acute cystitis without hematuria

## 2024-04-13 NOTE — PLAN OF CARE
Problem: Discharge Planning  Goal: Discharge to home or other facility with appropriate resources  Outcome: Progressing  Flowsheets (Taken 4/12/2024 2305)  Discharge to home or other facility with appropriate resources:   Identify barriers to discharge with patient and caregiver   Arrange for needed discharge resources and transportation as appropriate   Identify discharge learning needs (meds, wound care, etc)     Problem: Pain  Goal: Verbalizes/displays adequate comfort level or baseline comfort level  Outcome: Progressing  Flowsheets (Taken 4/12/2024 0819 by Kelly Hoover)  Verbalizes/displays adequate comfort level or baseline comfort level:   Encourage patient to monitor pain and request assistance   Assess pain using appropriate pain scale   Administer analgesics based on type and severity of pain and evaluate response   Implement non-pharmacological measures as appropriate and evaluate response     Problem: Safety - Adult  Goal: Free from fall injury  Outcome: Progressing  Flowsheets  Taken 4/12/2024 2305  Free From Fall Injury:   Instruct family/caregiver on patient safety   Based on caregiver fall risk screen, instruct family/caregiver to ask for assistance with transferring infant if caregiver noted to have fall risk factors  Taken 4/12/2024 2252  Free From Fall Injury:   Instruct family/caregiver on patient safety   Based on caregiver fall risk screen, instruct family/caregiver to ask for assistance with transferring infant if caregiver noted to have fall risk factors     Problem: Chronic Conditions and Co-morbidities  Goal: Patient's chronic conditions and co-morbidity symptoms are monitored and maintained or improved  Outcome: Progressing  Flowsheets (Taken 4/12/2024 0819 by Kelly Hoover)  Care Plan - Patient's Chronic Conditions and Co-Morbidity Symptoms are Monitored and Maintained or Improved:   Monitor and assess patient's chronic conditions and comorbid symptoms for stability, deterioration,  or improvement   Collaborate with multidisciplinary team to address chronic and comorbid conditions and prevent exacerbation or deterioration   Update acute care plan with appropriate goals if chronic or comorbid symptoms are exacerbated and prevent overall improvement and discharge

## 2024-05-11 ENCOUNTER — HOSPITAL ENCOUNTER (INPATIENT)
Age: 54
LOS: 5 days | Discharge: HOME | DRG: 133 | End: 2024-05-16
Attending: EMERGENCY MEDICINE | Admitting: HOSPITALIST
Payer: COMMERCIAL

## 2024-05-11 ENCOUNTER — APPOINTMENT (OUTPATIENT)
Dept: GENERAL RADIOLOGY | Age: 54
DRG: 133 | End: 2024-05-11
Payer: COMMERCIAL

## 2024-05-11 DIAGNOSIS — J96.22 ACUTE ON CHRONIC RESPIRATORY FAILURE WITH HYPOXIA AND HYPERCAPNIA (HCC): Primary | ICD-10-CM

## 2024-05-11 DIAGNOSIS — J96.21 ACUTE ON CHRONIC RESPIRATORY FAILURE WITH HYPOXIA AND HYPERCAPNIA (HCC): Primary | ICD-10-CM

## 2024-05-11 DIAGNOSIS — R53.1 GENERALIZED WEAKNESS: ICD-10-CM

## 2024-05-11 DIAGNOSIS — M79.89 LEG SWELLING: ICD-10-CM

## 2024-05-11 DIAGNOSIS — J44.1 COPD EXACERBATION (HCC): ICD-10-CM

## 2024-05-11 PROBLEM — R06.02 SOB (SHORTNESS OF BREATH): Status: ACTIVE | Noted: 2024-05-11

## 2024-05-11 LAB
ALBUMIN SERPL-MCNC: 3.5 G/DL (ref 3.4–5)
ALBUMIN/GLOB SERPL: 1 {RATIO} (ref 1.1–2.2)
ALP SERPL-CCNC: 102 U/L (ref 40–129)
ALT SERPL-CCNC: 14 U/L (ref 10–40)
ANION GAP SERPL CALCULATED.3IONS-SCNC: 8 MMOL/L (ref 3–16)
AST SERPL-CCNC: 14 U/L (ref 15–37)
BASE EXCESS BLDV CALC-SCNC: 15.5 MMOL/L (ref -3–3)
BASOPHILS # BLD: 0 K/UL (ref 0–0.2)
BASOPHILS NFR BLD: 0.3 %
BILIRUB SERPL-MCNC: 0.8 MG/DL (ref 0–1)
BUN SERPL-MCNC: 16 MG/DL (ref 7–20)
CALCIUM SERPL-MCNC: 9.4 MG/DL (ref 8.3–10.6)
CHLORIDE SERPL-SCNC: 95 MMOL/L (ref 99–110)
CO2 BLDV-SCNC: 40 MMOL/L
CO2 SERPL-SCNC: 36 MMOL/L (ref 21–32)
CREAT SERPL-MCNC: 0.7 MG/DL (ref 0.6–1.1)
DEPRECATED RDW RBC AUTO: 16.8 % (ref 12.4–15.4)
EOSINOPHIL # BLD: 0.1 K/UL (ref 0–0.6)
EOSINOPHIL NFR BLD: 2 %
FLUAV RNA UPPER RESP QL NAA+PROBE: NEGATIVE
FLUBV AG NPH QL: NEGATIVE
GFR SERPLBLD CREATININE-BSD FMLA CKD-EPI: >90 ML/MIN/{1.73_M2}
GLUCOSE BLD-MCNC: 260 MG/DL (ref 70–99)
GLUCOSE BLD-MCNC: 444 MG/DL (ref 70–99)
GLUCOSE SERPL-MCNC: 233 MG/DL (ref 70–99)
HCO3 BLDV-SCNC: 40.9 MMOL/L (ref 23–29)
HCT VFR BLD AUTO: 48.8 % (ref 36–48)
HGB BLD-MCNC: 15.1 G/DL (ref 12–16)
LACTATE BLDV-SCNC: 0.8 MMOL/L (ref 0.4–1.9)
LACTATE BLDV-SCNC: 0.9 MMOL/L (ref 0.4–1.9)
LYMPHOCYTES # BLD: 0.6 K/UL (ref 1–5.1)
LYMPHOCYTES NFR BLD: 10.4 %
MCH RBC QN AUTO: 25.7 PG (ref 26–34)
MCHC RBC AUTO-ENTMCNC: 30.8 G/DL (ref 31–36)
MCV RBC AUTO: 83.4 FL (ref 80–100)
MONOCYTES # BLD: 0.5 K/UL (ref 0–1.3)
MONOCYTES NFR BLD: 9 %
NEUTROPHILS # BLD: 4.6 K/UL (ref 1.7–7.7)
NEUTROPHILS NFR BLD: 78.3 %
NT-PROBNP SERPL-MCNC: 421 PG/ML (ref 0–124)
O2 THERAPY: ABNORMAL
PCO2 BLDV: 71.9 MMHG (ref 40–50)
PERFORMED ON: ABNORMAL
PERFORMED ON: ABNORMAL
PH BLDV: 7.36 [PH] (ref 7.35–7.45)
PLATELET # BLD AUTO: 132 K/UL (ref 135–450)
PMV BLD AUTO: 6.8 FL (ref 5–10.5)
PO2 BLDV: 32.4 MMHG (ref 25–40)
POTASSIUM SERPL-SCNC: 4.1 MMOL/L (ref 3.5–5.1)
PROCALCITONIN SERPL IA-MCNC: 0.11 NG/ML (ref 0–0.15)
PROT SERPL-MCNC: 7.1 G/DL (ref 6.4–8.2)
RBC # BLD AUTO: 5.86 M/UL (ref 4–5.2)
REPORT: NORMAL
RESP PATH DNA+RNA PNL NPH NAA+NON-PROBE: NORMAL
SAO2 % BLDV: 57 %
SODIUM SERPL-SCNC: 139 MMOL/L (ref 136–145)
TROPONIN, HIGH SENSITIVITY: 10 NG/L (ref 0–14)
TROPONIN, HIGH SENSITIVITY: 9 NG/L (ref 0–14)
WBC # BLD AUTO: 5.8 K/UL (ref 4–11)

## 2024-05-11 PROCEDURE — 2700000000 HC OXYGEN THERAPY PER DAY

## 2024-05-11 PROCEDURE — 82803 BLOOD GASES ANY COMBINATION: CPT

## 2024-05-11 PROCEDURE — 99285 EMERGENCY DEPT VISIT HI MDM: CPT

## 2024-05-11 PROCEDURE — 6360000002 HC RX W HCPCS: Performed by: HOSPITALIST

## 2024-05-11 PROCEDURE — 2580000003 HC RX 258: Performed by: HOSPITALIST

## 2024-05-11 PROCEDURE — 96374 THER/PROPH/DIAG INJ IV PUSH: CPT

## 2024-05-11 PROCEDURE — 6370000000 HC RX 637 (ALT 250 FOR IP): Performed by: EMERGENCY MEDICINE

## 2024-05-11 PROCEDURE — 36415 COLL VENOUS BLD VENIPUNCTURE: CPT

## 2024-05-11 PROCEDURE — 5A09357 ASSISTANCE WITH RESPIRATORY VENTILATION, LESS THAN 24 CONSECUTIVE HOURS, CONTINUOUS POSITIVE AIRWAY PRESSURE: ICD-10-PCS | Performed by: INTERNAL MEDICINE

## 2024-05-11 PROCEDURE — 94640 AIRWAY INHALATION TREATMENT: CPT

## 2024-05-11 PROCEDURE — 6370000000 HC RX 637 (ALT 250 FOR IP): Performed by: HOSPITALIST

## 2024-05-11 PROCEDURE — 94660 CPAP INITIATION&MGMT: CPT

## 2024-05-11 PROCEDURE — 0202U NFCT DS 22 TRGT SARS-COV-2: CPT

## 2024-05-11 PROCEDURE — 94761 N-INVAS EAR/PLS OXIMETRY MLT: CPT

## 2024-05-11 PROCEDURE — 84484 ASSAY OF TROPONIN QUANT: CPT

## 2024-05-11 PROCEDURE — 6360000002 HC RX W HCPCS: Performed by: EMERGENCY MEDICINE

## 2024-05-11 PROCEDURE — 2060000000 HC ICU INTERMEDIATE R&B

## 2024-05-11 PROCEDURE — 87804 INFLUENZA ASSAY W/OPTIC: CPT

## 2024-05-11 PROCEDURE — 2580000003 HC RX 258: Performed by: EMERGENCY MEDICINE

## 2024-05-11 PROCEDURE — 96375 TX/PRO/DX INJ NEW DRUG ADDON: CPT

## 2024-05-11 PROCEDURE — 85025 COMPLETE CBC W/AUTO DIFF WBC: CPT

## 2024-05-11 PROCEDURE — 80053 COMPREHEN METABOLIC PANEL: CPT

## 2024-05-11 PROCEDURE — 84145 PROCALCITONIN (PCT): CPT

## 2024-05-11 PROCEDURE — 93005 ELECTROCARDIOGRAM TRACING: CPT | Performed by: EMERGENCY MEDICINE

## 2024-05-11 PROCEDURE — 83605 ASSAY OF LACTIC ACID: CPT

## 2024-05-11 PROCEDURE — 2580000003 HC RX 258

## 2024-05-11 PROCEDURE — 83880 ASSAY OF NATRIURETIC PEPTIDE: CPT

## 2024-05-11 PROCEDURE — 71045 X-RAY EXAM CHEST 1 VIEW: CPT

## 2024-05-11 RX ORDER — POTASSIUM CHLORIDE 20 MEQ/1
40 TABLET, EXTENDED RELEASE ORAL PRN
Status: DISCONTINUED | OUTPATIENT
Start: 2024-05-11 | End: 2024-05-16 | Stop reason: HOSPADM

## 2024-05-11 RX ORDER — INSULIN LISPRO 100 [IU]/ML
0-16 INJECTION, SOLUTION INTRAVENOUS; SUBCUTANEOUS
Status: DISCONTINUED | OUTPATIENT
Start: 2024-05-11 | End: 2024-05-16 | Stop reason: HOSPADM

## 2024-05-11 RX ORDER — MAGNESIUM SULFATE IN WATER 40 MG/ML
2000 INJECTION, SOLUTION INTRAVENOUS PRN
Status: DISCONTINUED | OUTPATIENT
Start: 2024-05-11 | End: 2024-05-16 | Stop reason: HOSPADM

## 2024-05-11 RX ORDER — CARVEDILOL 12.5 MG/1
12.5 TABLET ORAL 2 TIMES DAILY WITH MEALS
Status: DISCONTINUED | OUTPATIENT
Start: 2024-05-11 | End: 2024-05-11

## 2024-05-11 RX ORDER — INSULIN GLARGINE 100 [IU]/ML
15 INJECTION, SOLUTION SUBCUTANEOUS NIGHTLY
Status: DISCONTINUED | OUTPATIENT
Start: 2024-05-11 | End: 2024-05-12

## 2024-05-11 RX ORDER — IPRATROPIUM BROMIDE AND ALBUTEROL SULFATE 2.5; .5 MG/3ML; MG/3ML
1 SOLUTION RESPIRATORY (INHALATION) ONCE
Status: COMPLETED | OUTPATIENT
Start: 2024-05-11 | End: 2024-05-11

## 2024-05-11 RX ORDER — POLYETHYLENE GLYCOL 3350 17 G/17G
17 POWDER, FOR SOLUTION ORAL DAILY PRN
Status: DISCONTINUED | OUTPATIENT
Start: 2024-05-11 | End: 2024-05-16 | Stop reason: HOSPADM

## 2024-05-11 RX ORDER — POTASSIUM CHLORIDE 7.45 MG/ML
10 INJECTION INTRAVENOUS PRN
Status: DISCONTINUED | OUTPATIENT
Start: 2024-05-11 | End: 2024-05-16 | Stop reason: HOSPADM

## 2024-05-11 RX ORDER — METHYLPREDNISOLONE SODIUM SUCCINATE 40 MG/ML
40 INJECTION, POWDER, LYOPHILIZED, FOR SOLUTION INTRAMUSCULAR; INTRAVENOUS EVERY 12 HOURS
Status: DISCONTINUED | OUTPATIENT
Start: 2024-05-11 | End: 2024-05-13

## 2024-05-11 RX ORDER — SODIUM CHLORIDE 9 MG/ML
INJECTION, SOLUTION INTRAVENOUS PRN
Status: DISCONTINUED | OUTPATIENT
Start: 2024-05-11 | End: 2024-05-16 | Stop reason: HOSPADM

## 2024-05-11 RX ORDER — DEXTROSE MONOHYDRATE 100 MG/ML
INJECTION, SOLUTION INTRAVENOUS CONTINUOUS PRN
Status: DISCONTINUED | OUTPATIENT
Start: 2024-05-11 | End: 2024-05-16 | Stop reason: HOSPADM

## 2024-05-11 RX ORDER — ATORVASTATIN CALCIUM 40 MG/1
40 TABLET, FILM COATED ORAL NIGHTLY
Status: DISCONTINUED | OUTPATIENT
Start: 2024-05-11 | End: 2024-05-16 | Stop reason: HOSPADM

## 2024-05-11 RX ORDER — ONDANSETRON 4 MG/1
4 TABLET, ORALLY DISINTEGRATING ORAL EVERY 8 HOURS PRN
Status: DISCONTINUED | OUTPATIENT
Start: 2024-05-11 | End: 2024-05-16 | Stop reason: HOSPADM

## 2024-05-11 RX ORDER — LEVOTHYROXINE SODIUM 0.05 MG/1
50 TABLET ORAL
Status: DISCONTINUED | OUTPATIENT
Start: 2024-05-12 | End: 2024-05-16 | Stop reason: HOSPADM

## 2024-05-11 RX ORDER — LOSARTAN POTASSIUM AND HYDROCHLOROTHIAZIDE 12.5; 5 MG/1; MG/1
1 TABLET ORAL DAILY
Status: DISCONTINUED | OUTPATIENT
Start: 2024-05-12 | End: 2024-05-12

## 2024-05-11 RX ORDER — CARVEDILOL 12.5 MG/1
12.5 TABLET ORAL 2 TIMES DAILY WITH MEALS
Status: DISCONTINUED | OUTPATIENT
Start: 2024-05-11 | End: 2024-05-16 | Stop reason: HOSPADM

## 2024-05-11 RX ORDER — ALBUTEROL SULFATE 2.5 MG/3ML
2.5 SOLUTION RESPIRATORY (INHALATION) EVERY 4 HOURS PRN
Status: DISCONTINUED | OUTPATIENT
Start: 2024-05-11 | End: 2024-05-16 | Stop reason: HOSPADM

## 2024-05-11 RX ORDER — ACETAMINOPHEN 325 MG/1
650 TABLET ORAL EVERY 6 HOURS PRN
Status: DISCONTINUED | OUTPATIENT
Start: 2024-05-11 | End: 2024-05-16 | Stop reason: HOSPADM

## 2024-05-11 RX ORDER — SODIUM CHLORIDE 0.9 % (FLUSH) 0.9 %
5-40 SYRINGE (ML) INJECTION PRN
Status: DISCONTINUED | OUTPATIENT
Start: 2024-05-11 | End: 2024-05-16 | Stop reason: HOSPADM

## 2024-05-11 RX ORDER — METHYLPREDNISOLONE SODIUM SUCCINATE 40 MG/ML
40 INJECTION, POWDER, LYOPHILIZED, FOR SOLUTION INTRAMUSCULAR; INTRAVENOUS ONCE
Status: COMPLETED | OUTPATIENT
Start: 2024-05-11 | End: 2024-05-11

## 2024-05-11 RX ORDER — ONDANSETRON 2 MG/ML
4 INJECTION INTRAMUSCULAR; INTRAVENOUS EVERY 6 HOURS PRN
Status: DISCONTINUED | OUTPATIENT
Start: 2024-05-11 | End: 2024-05-16 | Stop reason: HOSPADM

## 2024-05-11 RX ORDER — ENOXAPARIN SODIUM 100 MG/ML
40 INJECTION SUBCUTANEOUS DAILY
Status: DISCONTINUED | OUTPATIENT
Start: 2024-05-12 | End: 2024-05-16 | Stop reason: HOSPADM

## 2024-05-11 RX ORDER — SODIUM CHLORIDE 0.9 % (FLUSH) 0.9 %
5-40 SYRINGE (ML) INJECTION EVERY 12 HOURS SCHEDULED
Status: DISCONTINUED | OUTPATIENT
Start: 2024-05-11 | End: 2024-05-16 | Stop reason: HOSPADM

## 2024-05-11 RX ORDER — GLUCAGON 1 MG/ML
1 KIT INJECTION PRN
Status: DISCONTINUED | OUTPATIENT
Start: 2024-05-11 | End: 2024-05-16 | Stop reason: HOSPADM

## 2024-05-11 RX ORDER — HYDROCODONE BITARTRATE AND ACETAMINOPHEN 5; 325 MG/1; MG/1
1 TABLET ORAL EVERY 6 HOURS PRN
Status: DISCONTINUED | OUTPATIENT
Start: 2024-05-11 | End: 2024-05-12

## 2024-05-11 RX ORDER — INSULIN LISPRO 100 [IU]/ML
0-4 INJECTION, SOLUTION INTRAVENOUS; SUBCUTANEOUS NIGHTLY
Status: DISCONTINUED | OUTPATIENT
Start: 2024-05-11 | End: 2024-05-16 | Stop reason: HOSPADM

## 2024-05-11 RX ORDER — WATER 10 ML/10ML
INJECTION INTRAMUSCULAR; INTRAVENOUS; SUBCUTANEOUS
Status: COMPLETED
Start: 2024-05-11 | End: 2024-05-11

## 2024-05-11 RX ORDER — ACETAMINOPHEN 650 MG/1
650 SUPPOSITORY RECTAL EVERY 6 HOURS PRN
Status: DISCONTINUED | OUTPATIENT
Start: 2024-05-11 | End: 2024-05-16 | Stop reason: HOSPADM

## 2024-05-11 RX ORDER — ACETAMINOPHEN 500 MG
1000 TABLET ORAL ONCE
Status: COMPLETED | OUTPATIENT
Start: 2024-05-11 | End: 2024-05-11

## 2024-05-11 RX ADMIN — IPRATROPIUM BROMIDE AND ALBUTEROL SULFATE 1 DOSE: .5; 2.5 SOLUTION RESPIRATORY (INHALATION) at 11:46

## 2024-05-11 RX ADMIN — CARVEDILOL 12.5 MG: 12.5 TABLET, FILM COATED ORAL at 16:14

## 2024-05-11 RX ADMIN — INSULIN LISPRO 4 UNITS: 100 INJECTION, SOLUTION INTRAVENOUS; SUBCUTANEOUS at 21:18

## 2024-05-11 RX ADMIN — AZITHROMYCIN MONOHYDRATE 500 MG: 500 INJECTION, POWDER, LYOPHILIZED, FOR SOLUTION INTRAVENOUS at 09:28

## 2024-05-11 RX ADMIN — INSULIN LISPRO 8 UNITS: 100 INJECTION, SOLUTION INTRAVENOUS; SUBCUTANEOUS at 17:09

## 2024-05-11 RX ADMIN — SODIUM CHLORIDE, PRESERVATIVE FREE 10 ML: 5 INJECTION INTRAVENOUS at 15:50

## 2024-05-11 RX ADMIN — HYDROCODONE BITARTRATE AND ACETAMINOPHEN 1 TABLET: 5; 325 TABLET ORAL at 18:40

## 2024-05-11 RX ADMIN — AZITHROMYCIN MONOHYDRATE 500 MG: 500 INJECTION, POWDER, LYOPHILIZED, FOR SOLUTION INTRAVENOUS at 15:54

## 2024-05-11 RX ADMIN — ATORVASTATIN CALCIUM 40 MG: 40 TABLET, FILM COATED ORAL at 21:16

## 2024-05-11 RX ADMIN — METHYLPREDNISOLONE SODIUM SUCCINATE 40 MG: 40 INJECTION INTRAMUSCULAR; INTRAVENOUS at 08:45

## 2024-05-11 RX ADMIN — WATER 10 ML: 1 INJECTION INTRAMUSCULAR; INTRAVENOUS; SUBCUTANEOUS at 21:16

## 2024-05-11 RX ADMIN — INSULIN GLARGINE 15 UNITS: 100 INJECTION, SOLUTION SUBCUTANEOUS at 21:17

## 2024-05-11 RX ADMIN — ACETAMINOPHEN 1000 MG: 500 TABLET ORAL at 08:44

## 2024-05-11 RX ADMIN — WATER 1000 MG: 1 INJECTION INTRAMUSCULAR; INTRAVENOUS; SUBCUTANEOUS at 09:23

## 2024-05-11 RX ADMIN — SODIUM CHLORIDE, PRESERVATIVE FREE 10 ML: 5 INJECTION INTRAVENOUS at 21:17

## 2024-05-11 RX ADMIN — METHYLPREDNISOLONE SODIUM SUCCINATE 40 MG: 40 INJECTION INTRAMUSCULAR; INTRAVENOUS at 21:16

## 2024-05-11 RX ADMIN — IPRATROPIUM BROMIDE AND ALBUTEROL SULFATE 1 DOSE: .5; 2.5 SOLUTION RESPIRATORY (INHALATION) at 08:46

## 2024-05-11 RX ADMIN — ALBUTEROL SULFATE 2.5 MG: 2.5 SOLUTION RESPIRATORY (INHALATION) at 23:27

## 2024-05-11 RX ADMIN — CARVEDILOL 12.5 MG: 12.5 TABLET, FILM COATED ORAL at 21:16

## 2024-05-11 RX ADMIN — SODIUM CHLORIDE 25 ML: 9 INJECTION, SOLUTION INTRAVENOUS at 15:52

## 2024-05-11 ASSESSMENT — PAIN DESCRIPTION - LOCATION
LOCATION: GENERALIZED
LOCATION: KNEE;HEAD
LOCATION: GENERALIZED

## 2024-05-11 ASSESSMENT — PAIN SCALES - GENERAL
PAINLEVEL_OUTOF10: 7
PAINLEVEL_OUTOF10: 10
PAINLEVEL_OUTOF10: 6
PAINLEVEL_OUTOF10: 10
PAINLEVEL_OUTOF10: 7

## 2024-05-11 ASSESSMENT — PAIN - FUNCTIONAL ASSESSMENT
PAIN_FUNCTIONAL_ASSESSMENT: 0-10
PAIN_FUNCTIONAL_ASSESSMENT: PREVENTS OR INTERFERES SOME ACTIVE ACTIVITIES AND ADLS

## 2024-05-11 ASSESSMENT — PAIN DESCRIPTION - ORIENTATION: ORIENTATION: LEFT;RIGHT;MID

## 2024-05-11 ASSESSMENT — PAIN DESCRIPTION - DESCRIPTORS: DESCRIPTORS: ACHING;THROBBING

## 2024-05-11 ASSESSMENT — PAIN SCALES - WONG BAKER: WONGBAKER_NUMERICALRESPONSE: NO HURT

## 2024-05-11 NOTE — ED NOTES
O2 sat 88-89 on 4 l/nc.  Patient repositioned in bed. Patient denies increase in shortness of breath. Provider aware.  O2 increased to 5l/nc.

## 2024-05-11 NOTE — H&P
V2.0  History and Physical      Name:  Lacey Champagne /Age/Sex: 1970  (54 y.o. female)   MRN & CSN:  [id number] & [id number] Encounter Date/Time: 2024 1:56 PM EDT   Location:  [id number] PCP: [facility], [facility] Day: 1    Assessment and Plan:   Patient is a 54-year old morbidly obese female past medical history of chronic hypoxic and hypercapnic respiratory failure on 2 L nasal cannula, hypertension, hyperlipidemia, hypothyroidism, COPD, diabetes who presented to ED as a transfer from [facility] presented there with shortness of breath.     #.  Acute on chronic hypoxic and hypercapnic aspiratory failure  #.  Acute exacerbation of COPD  #.  CO2 narcosis  #.  Diabetes mellitus, type II with hyperglycemia  #.  Hypothyroidism likely acquired  #.  Other medical problem include hyperlipidemia, hypertension, morbid history    Plan:  Check procalcitonin, obtain complaints of viral panel  Start bronchodilators and wean off oxygen as tolerated  Start azithromycin and IV steroids  Consult pulmonary  BiPAP at night and as needed  Start Lantus and insulin sliding scale  Resume home levothyroxine for hypothyroidism  Resume home medication for chronic medical issues.    DVT Prophylaxis: lovenox  Code status: full support            Chief Complain:    Shortness of breath  History of Present Illness:     Patient is a 54-year old morbidly obese female past medical history of chronic hypoxic and hypercapnic respiratory failure on 2 L nasal cannula, hypertension, hyperlipidemia, hypothyroidism, COPD, diabetes who presented to ED as a transfer from [facility] presented there with shortness of breath.  Patient states for the past 1 week, she has had progressive shortness of breath associated with chills and fever.  She states that shortness of breath with progressive which prompted her to go to the ER.  In the ER, patient was given steroids, azithromycin and breathing treatments.  Chest x-ray did not

## 2024-05-11 NOTE — ED NOTES
[facility] transport team here.  Report given.  Patient transported to [facility] 5287.  O2 @ 5 l/nc.

## 2024-05-11 NOTE — ED PROVIDER NOTES
(KLOR-CON M) extended release tablet 40 mEq (has no administration in time range)     Or   potassium bicarb-citric acid (EFFER-K) effervescent tablet 40 mEq (has no administration in time range)     Or   potassium chloride 10 mEq/100 mL IVPB (Peripheral Line) (has no administration in time range)   magnesium sulfate 2000 mg in 50 mL IVPB premix (has no administration in time range)   enoxaparin (LOVENOX) injection 40 mg (has no administration in time range)   ondansetron (ZOFRAN-ODT) disintegrating tablet 4 mg (has no administration in time range)     Or   ondansetron (ZOFRAN) injection 4 mg (has no administration in time range)   polyethylene glycol (GLYCOLAX) packet 17 g (has no administration in time range)   acetaminophen (TYLENOL) tablet 650 mg (has no administration in time range)     Or   acetaminophen (TYLENOL) suppository 650 mg (has no administration in time range)   ipratropium 0.5 mg-albuterol 2.5 mg (DUONEB) nebulizer solution 1 Dose (has no administration in time range)   ipratropium 0.5 mg-albuterol 2.5 mg (DUONEB) nebulizer solution 1 Dose (1 Dose Inhalation Given 5/11/24 0846)   acetaminophen (TYLENOL) tablet 1,000 mg (1,000 mg Oral Given 5/11/24 0844)   methylPREDNISolone sodium succ (SOLU-MEDROL) injection 40 mg (40 mg IntraVENous Given 5/11/24 0845)   cefTRIAXone (ROCEPHIN) 1,000 mg in sterile water 10 mL IV syringe (1,000 mg IntraVENous Given 5/11/24 0923)   azithromycin (ZITHROMAX) 500 mg in 250 mL addavial (0 mg IntraVENous Stopped 5/11/24 1035)       Disposition Considerations (tests considered but not done, Shared Decision Making, Pt Expectation of Test or Tx.): Shared decision making used for admission    I am the Primary Clinician of Record.    I PERSONALLY SAW THE PATIENT AND PERFORMED A SUBSTANTIVE PORTION OF THE VISIT INCLUDING ALL ASPECTS OF THE MEDICAL DECISION MAKING PROCESS.    FINAL IMPRESSION      1. Acute on chronic respiratory failure with hypoxia and hypercapnia (HCC)    2.

## 2024-05-12 LAB
EKG ATRIAL RATE: 85 BPM
EKG DIAGNOSIS: NORMAL
EKG P AXIS: 54 DEGREES
EKG P-R INTERVAL: 176 MS
EKG Q-T INTERVAL: 400 MS
EKG QRS DURATION: 96 MS
EKG QTC CALCULATION (BAZETT): 476 MS
EKG R AXIS: -37 DEGREES
EKG T AXIS: 32 DEGREES
EKG VENTRICULAR RATE: 85 BPM
GLUCOSE BLD-MCNC: 322 MG/DL (ref 70–99)
GLUCOSE BLD-MCNC: 330 MG/DL (ref 70–99)
GLUCOSE BLD-MCNC: 371 MG/DL (ref 70–99)
GLUCOSE BLD-MCNC: 385 MG/DL (ref 70–99)
GLUCOSE BLD-MCNC: 468 MG/DL (ref 70–99)
ORGANISM: ABNORMAL
ORGANISM: ABNORMAL
PERFORMED ON: ABNORMAL
REPORT: NORMAL
RESP PATH DNA+RNA PNL L RESP NAA+NON-PRB: ABNORMAL
RESP PATH DNA+RNA PNL L RESP NAA+NON-PRB: ABNORMAL

## 2024-05-12 PROCEDURE — 94669 MECHANICAL CHEST WALL OSCILL: CPT

## 2024-05-12 PROCEDURE — 94660 CPAP INITIATION&MGMT: CPT

## 2024-05-12 PROCEDURE — 6370000000 HC RX 637 (ALT 250 FOR IP): Performed by: HOSPITALIST

## 2024-05-12 PROCEDURE — 6360000002 HC RX W HCPCS: Performed by: HOSPITALIST

## 2024-05-12 PROCEDURE — 6370000000 HC RX 637 (ALT 250 FOR IP): Performed by: NURSE PRACTITIONER

## 2024-05-12 PROCEDURE — 94761 N-INVAS EAR/PLS OXIMETRY MLT: CPT

## 2024-05-12 PROCEDURE — 87205 SMEAR GRAM STAIN: CPT

## 2024-05-12 PROCEDURE — 2060000000 HC ICU INTERMEDIATE R&B

## 2024-05-12 PROCEDURE — 6370000000 HC RX 637 (ALT 250 FOR IP): Performed by: INTERNAL MEDICINE

## 2024-05-12 PROCEDURE — 2700000000 HC OXYGEN THERAPY PER DAY

## 2024-05-12 PROCEDURE — 2580000003 HC RX 258

## 2024-05-12 PROCEDURE — 2580000003 HC RX 258: Performed by: HOSPITALIST

## 2024-05-12 PROCEDURE — 94640 AIRWAY INHALATION TREATMENT: CPT

## 2024-05-12 PROCEDURE — 87633 RESP VIRUS 12-25 TARGETS: CPT

## 2024-05-12 PROCEDURE — 99223 1ST HOSP IP/OBS HIGH 75: CPT | Performed by: INTERNAL MEDICINE

## 2024-05-12 PROCEDURE — 93010 ELECTROCARDIOGRAM REPORT: CPT | Performed by: INTERNAL MEDICINE

## 2024-05-12 PROCEDURE — 87070 CULTURE OTHR SPECIMN AEROBIC: CPT

## 2024-05-12 RX ORDER — LOSARTAN POTASSIUM 50 MG/1
50 TABLET ORAL DAILY
Status: DISCONTINUED | OUTPATIENT
Start: 2024-05-12 | End: 2024-05-16 | Stop reason: HOSPADM

## 2024-05-12 RX ORDER — WATER 10 ML/10ML
INJECTION INTRAMUSCULAR; INTRAVENOUS; SUBCUTANEOUS
Status: COMPLETED
Start: 2024-05-12 | End: 2024-05-12

## 2024-05-12 RX ORDER — INSULIN LISPRO 100 [IU]/ML
4 INJECTION, SOLUTION INTRAVENOUS; SUBCUTANEOUS ONCE
Status: COMPLETED | OUTPATIENT
Start: 2024-05-12 | End: 2024-05-12

## 2024-05-12 RX ORDER — INSULIN LISPRO 100 [IU]/ML
7 INJECTION, SOLUTION INTRAVENOUS; SUBCUTANEOUS
Status: DISCONTINUED | OUTPATIENT
Start: 2024-05-12 | End: 2024-05-13

## 2024-05-12 RX ORDER — BENZONATATE 100 MG/1
100 CAPSULE ORAL 3 TIMES DAILY PRN
Status: DISCONTINUED | OUTPATIENT
Start: 2024-05-12 | End: 2024-05-16 | Stop reason: HOSPADM

## 2024-05-12 RX ORDER — GUAIFENESIN/DEXTROMETHORPHAN 100-10MG/5
10 SYRUP ORAL EVERY 4 HOURS PRN
Status: DISCONTINUED | OUTPATIENT
Start: 2024-05-12 | End: 2024-05-16 | Stop reason: HOSPADM

## 2024-05-12 RX ORDER — INSULIN LISPRO 100 [IU]/ML
25 INJECTION, SOLUTION INTRAVENOUS; SUBCUTANEOUS ONCE
Status: COMPLETED | OUTPATIENT
Start: 2024-05-12 | End: 2024-05-12

## 2024-05-12 RX ORDER — HYDROCHLOROTHIAZIDE 25 MG/1
12.5 TABLET ORAL DAILY
Status: DISCONTINUED | OUTPATIENT
Start: 2024-05-12 | End: 2024-05-16 | Stop reason: HOSPADM

## 2024-05-12 RX ORDER — INSULIN GLARGINE 100 [IU]/ML
25 INJECTION, SOLUTION SUBCUTANEOUS NIGHTLY
Status: DISCONTINUED | OUTPATIENT
Start: 2024-05-12 | End: 2024-05-13

## 2024-05-12 RX ORDER — HYDROCODONE BITARTRATE AND ACETAMINOPHEN 5; 325 MG/1; MG/1
1 TABLET ORAL EVERY 4 HOURS PRN
Status: DISCONTINUED | OUTPATIENT
Start: 2024-05-12 | End: 2024-05-16 | Stop reason: HOSPADM

## 2024-05-12 RX ADMIN — LOSARTAN POTASSIUM 50 MG: 50 TABLET, FILM COATED ORAL at 08:45

## 2024-05-12 RX ADMIN — HYDROCODONE BITARTRATE AND ACETAMINOPHEN 1 TABLET: 5; 325 TABLET ORAL at 14:34

## 2024-05-12 RX ADMIN — METHYLPREDNISOLONE SODIUM SUCCINATE 40 MG: 40 INJECTION INTRAMUSCULAR; INTRAVENOUS at 08:45

## 2024-05-12 RX ADMIN — Medication 2 PUFF: at 20:08

## 2024-05-12 RX ADMIN — HYDROCHLOROTHIAZIDE 12.5 MG: 25 TABLET ORAL at 08:45

## 2024-05-12 RX ADMIN — LEVOTHYROXINE SODIUM 50 MCG: 0.05 TABLET ORAL at 07:18

## 2024-05-12 RX ADMIN — HYDROCODONE BITARTRATE AND ACETAMINOPHEN 1 TABLET: 5; 325 TABLET ORAL at 01:10

## 2024-05-12 RX ADMIN — BENZONATATE 100 MG: 100 CAPSULE ORAL at 21:47

## 2024-05-12 RX ADMIN — HYDROCODONE BITARTRATE AND ACETAMINOPHEN 1 TABLET: 5; 325 TABLET ORAL at 07:43

## 2024-05-12 RX ADMIN — AZITHROMYCIN MONOHYDRATE 500 MG: 500 INJECTION, POWDER, LYOPHILIZED, FOR SOLUTION INTRAVENOUS at 09:02

## 2024-05-12 RX ADMIN — Medication: at 22:14

## 2024-05-12 RX ADMIN — ATORVASTATIN CALCIUM 40 MG: 40 TABLET, FILM COATED ORAL at 22:14

## 2024-05-12 RX ADMIN — DEXTROMETHORPHAN HYDROBROMIDE, GUAIFENESIN 10 ML: 10; 100 LIQUID ORAL at 12:09

## 2024-05-12 RX ADMIN — SODIUM CHLORIDE, PRESERVATIVE FREE 10 ML: 5 INJECTION INTRAVENOUS at 08:55

## 2024-05-12 RX ADMIN — INSULIN GLARGINE 25 UNITS: 100 INJECTION, SOLUTION SUBCUTANEOUS at 22:14

## 2024-05-12 RX ADMIN — INSULIN LISPRO 25 UNITS: 100 INJECTION, SOLUTION INTRAVENOUS; SUBCUTANEOUS at 08:54

## 2024-05-12 RX ADMIN — INSULIN LISPRO 4 UNITS: 100 INJECTION, SOLUTION INTRAVENOUS; SUBCUTANEOUS at 01:41

## 2024-05-12 RX ADMIN — ACETAMINOPHEN 650 MG: 325 TABLET ORAL at 03:16

## 2024-05-12 RX ADMIN — WATER 1 ML: 1 INJECTION INTRAMUSCULAR; INTRAVENOUS; SUBCUTANEOUS at 08:54

## 2024-05-12 RX ADMIN — DEXTROMETHORPHAN HYDROBROMIDE, GUAIFENESIN 10 ML: 10; 100 LIQUID ORAL at 01:10

## 2024-05-12 RX ADMIN — INSULIN LISPRO 12 UNITS: 100 INJECTION, SOLUTION INTRAVENOUS; SUBCUTANEOUS at 18:27

## 2024-05-12 RX ADMIN — CARVEDILOL 12.5 MG: 12.5 TABLET, FILM COATED ORAL at 08:45

## 2024-05-12 RX ADMIN — ENOXAPARIN SODIUM 40 MG: 100 INJECTION SUBCUTANEOUS at 08:49

## 2024-05-12 RX ADMIN — Medication 2 PUFF: at 08:00

## 2024-05-12 RX ADMIN — INSULIN LISPRO 7 UNITS: 100 INJECTION, SOLUTION INTRAVENOUS; SUBCUTANEOUS at 12:44

## 2024-05-12 RX ADMIN — INSULIN LISPRO 4 UNITS: 100 INJECTION, SOLUTION INTRAVENOUS; SUBCUTANEOUS at 22:14

## 2024-05-12 RX ADMIN — INSULIN LISPRO 12 UNITS: 100 INJECTION, SOLUTION INTRAVENOUS; SUBCUTANEOUS at 12:44

## 2024-05-12 RX ADMIN — IPRATROPIUM BROMIDE 0.5 MG: 0.5 SOLUTION RESPIRATORY (INHALATION) at 15:48

## 2024-05-12 RX ADMIN — INSULIN LISPRO 7 UNITS: 100 INJECTION, SOLUTION INTRAVENOUS; SUBCUTANEOUS at 18:27

## 2024-05-12 RX ADMIN — METHYLPREDNISOLONE SODIUM SUCCINATE 40 MG: 40 INJECTION INTRAMUSCULAR; INTRAVENOUS at 22:14

## 2024-05-12 RX ADMIN — CARVEDILOL 12.5 MG: 12.5 TABLET, FILM COATED ORAL at 18:27

## 2024-05-12 RX ADMIN — ALBUTEROL SULFATE 2.5 MG: 2.5 SOLUTION RESPIRATORY (INHALATION) at 07:55

## 2024-05-12 RX ADMIN — HYDROCODONE BITARTRATE AND ACETAMINOPHEN 1 TABLET: 5; 325 TABLET ORAL at 21:47

## 2024-05-12 RX ADMIN — SODIUM CHLORIDE, PRESERVATIVE FREE 10 ML: 5 INJECTION INTRAVENOUS at 22:22

## 2024-05-12 ASSESSMENT — PAIN DESCRIPTION - LOCATION
LOCATION: KNEE

## 2024-05-12 ASSESSMENT — PAIN DESCRIPTION - ORIENTATION
ORIENTATION: RIGHT;LEFT
ORIENTATION: LEFT;RIGHT
ORIENTATION: RIGHT;LEFT

## 2024-05-12 ASSESSMENT — PAIN SCALES - GENERAL
PAINLEVEL_OUTOF10: 7
PAINLEVEL_OUTOF10: 3
PAINLEVEL_OUTOF10: 8
PAINLEVEL_OUTOF10: 7
PAINLEVEL_OUTOF10: 5

## 2024-05-12 ASSESSMENT — PAIN SCALES - WONG BAKER
WONGBAKER_NUMERICALRESPONSE: NO HURT

## 2024-05-12 ASSESSMENT — PAIN DESCRIPTION - DESCRIPTORS
DESCRIPTORS: ACHING;DISCOMFORT
DESCRIPTORS: ACHING;DISCOMFORT
DESCRIPTORS: ACHING
DESCRIPTORS: ACHING;DISCOMFORT;THROBBING

## 2024-05-13 LAB
GLUCOSE BLD-MCNC: 227 MG/DL (ref 70–99)
GLUCOSE BLD-MCNC: 269 MG/DL (ref 70–99)
GLUCOSE BLD-MCNC: 381 MG/DL (ref 70–99)
GLUCOSE BLD-MCNC: 389 MG/DL (ref 70–99)
PERFORMED ON: ABNORMAL

## 2024-05-13 PROCEDURE — 6370000000 HC RX 637 (ALT 250 FOR IP): Performed by: HOSPITALIST

## 2024-05-13 PROCEDURE — 6370000000 HC RX 637 (ALT 250 FOR IP): Performed by: NURSE PRACTITIONER

## 2024-05-13 PROCEDURE — 6370000000 HC RX 637 (ALT 250 FOR IP): Performed by: INTERNAL MEDICINE

## 2024-05-13 PROCEDURE — 99233 SBSQ HOSP IP/OBS HIGH 50: CPT | Performed by: INTERNAL MEDICINE

## 2024-05-13 PROCEDURE — 2060000000 HC ICU INTERMEDIATE R&B

## 2024-05-13 PROCEDURE — 2580000003 HC RX 258: Performed by: HOSPITALIST

## 2024-05-13 PROCEDURE — 2700000000 HC OXYGEN THERAPY PER DAY

## 2024-05-13 PROCEDURE — 6360000002 HC RX W HCPCS: Performed by: HOSPITALIST

## 2024-05-13 PROCEDURE — 97161 PT EVAL LOW COMPLEX 20 MIN: CPT

## 2024-05-13 PROCEDURE — 94669 MECHANICAL CHEST WALL OSCILL: CPT

## 2024-05-13 PROCEDURE — 97165 OT EVAL LOW COMPLEX 30 MIN: CPT

## 2024-05-13 PROCEDURE — 94640 AIRWAY INHALATION TREATMENT: CPT

## 2024-05-13 PROCEDURE — 94761 N-INVAS EAR/PLS OXIMETRY MLT: CPT

## 2024-05-13 PROCEDURE — 97530 THERAPEUTIC ACTIVITIES: CPT

## 2024-05-13 RX ORDER — PREDNISONE 20 MG/1
40 TABLET ORAL DAILY
Status: DISCONTINUED | OUTPATIENT
Start: 2024-05-13 | End: 2024-05-16 | Stop reason: HOSPADM

## 2024-05-13 RX ORDER — INSULIN LISPRO 100 [IU]/ML
12 INJECTION, SOLUTION INTRAVENOUS; SUBCUTANEOUS
Status: DISCONTINUED | OUTPATIENT
Start: 2024-05-13 | End: 2024-05-14

## 2024-05-13 RX ORDER — AZITHROMYCIN 500 MG/1
500 TABLET, FILM COATED ORAL DAILY
Status: DISCONTINUED | OUTPATIENT
Start: 2024-05-13 | End: 2024-05-13

## 2024-05-13 RX ORDER — LINEZOLID 2 MG/ML
600 INJECTION, SOLUTION INTRAVENOUS EVERY 12 HOURS
Status: DISCONTINUED | OUTPATIENT
Start: 2024-05-13 | End: 2024-05-14

## 2024-05-13 RX ORDER — INSULIN GLARGINE 100 [IU]/ML
30 INJECTION, SOLUTION SUBCUTANEOUS NIGHTLY
Status: DISCONTINUED | OUTPATIENT
Start: 2024-05-13 | End: 2024-05-16 | Stop reason: HOSPADM

## 2024-05-13 RX ADMIN — HYDROCODONE BITARTRATE AND ACETAMINOPHEN 1 TABLET: 5; 325 TABLET ORAL at 20:22

## 2024-05-13 RX ADMIN — SODIUM CHLORIDE 25 ML: 9 INJECTION, SOLUTION INTRAVENOUS at 08:38

## 2024-05-13 RX ADMIN — HYDROCODONE BITARTRATE AND ACETAMINOPHEN 1 TABLET: 5; 325 TABLET ORAL at 15:57

## 2024-05-13 RX ADMIN — LINEZOLID 600 MG: 600 INJECTION, SOLUTION INTRAVENOUS at 10:43

## 2024-05-13 RX ADMIN — AZITHROMYCIN 500 MG: 500 TABLET, FILM COATED ORAL at 09:20

## 2024-05-13 RX ADMIN — INSULIN LISPRO 12 UNITS: 100 INJECTION, SOLUTION INTRAVENOUS; SUBCUTANEOUS at 18:23

## 2024-05-13 RX ADMIN — Medication 2 PUFF: at 19:26

## 2024-05-13 RX ADMIN — SODIUM CHLORIDE, PRESERVATIVE FREE 10 ML: 5 INJECTION INTRAVENOUS at 21:00

## 2024-05-13 RX ADMIN — INSULIN LISPRO 12 UNITS: 100 INJECTION, SOLUTION INTRAVENOUS; SUBCUTANEOUS at 12:37

## 2024-05-13 RX ADMIN — HYDROCHLOROTHIAZIDE 12.5 MG: 25 TABLET ORAL at 09:19

## 2024-05-13 RX ADMIN — ATORVASTATIN CALCIUM 40 MG: 40 TABLET, FILM COATED ORAL at 20:22

## 2024-05-13 RX ADMIN — BENZONATATE 100 MG: 100 CAPSULE ORAL at 10:38

## 2024-05-13 RX ADMIN — DEXTROMETHORPHAN HYDROBROMIDE, GUAIFENESIN 10 ML: 10; 100 LIQUID ORAL at 16:04

## 2024-05-13 RX ADMIN — ENOXAPARIN SODIUM 40 MG: 100 INJECTION SUBCUTANEOUS at 09:19

## 2024-05-13 RX ADMIN — INSULIN LISPRO 7 UNITS: 100 INJECTION, SOLUTION INTRAVENOUS; SUBCUTANEOUS at 09:21

## 2024-05-13 RX ADMIN — Medication 2 PUFF: at 08:38

## 2024-05-13 RX ADMIN — SODIUM CHLORIDE, PRESERVATIVE FREE 10 ML: 5 INJECTION INTRAVENOUS at 09:20

## 2024-05-13 RX ADMIN — ACETAMINOPHEN 650 MG: 325 TABLET ORAL at 02:37

## 2024-05-13 RX ADMIN — LINEZOLID 600 MG: 600 INJECTION, SOLUTION INTRAVENOUS at 23:11

## 2024-05-13 RX ADMIN — INSULIN LISPRO 16 UNITS: 100 INJECTION, SOLUTION INTRAVENOUS; SUBCUTANEOUS at 12:36

## 2024-05-13 RX ADMIN — BENZONATATE 100 MG: 100 CAPSULE ORAL at 20:22

## 2024-05-13 RX ADMIN — CEFTRIAXONE SODIUM 2000 MG: 2 INJECTION, POWDER, FOR SOLUTION INTRAMUSCULAR; INTRAVENOUS at 09:31

## 2024-05-13 RX ADMIN — LOSARTAN POTASSIUM 50 MG: 50 TABLET, FILM COATED ORAL at 09:20

## 2024-05-13 RX ADMIN — HYDROCODONE BITARTRATE AND ACETAMINOPHEN 1 TABLET: 5; 325 TABLET ORAL at 05:43

## 2024-05-13 RX ADMIN — INSULIN LISPRO 8 UNITS: 100 INJECTION, SOLUTION INTRAVENOUS; SUBCUTANEOUS at 18:23

## 2024-05-13 RX ADMIN — INSULIN GLARGINE 30 UNITS: 100 INJECTION, SOLUTION SUBCUTANEOUS at 21:00

## 2024-05-13 RX ADMIN — INSULIN LISPRO 16 UNITS: 100 INJECTION, SOLUTION INTRAVENOUS; SUBCUTANEOUS at 09:20

## 2024-05-13 RX ADMIN — HYDROCODONE BITARTRATE AND ACETAMINOPHEN 1 TABLET: 5; 325 TABLET ORAL at 10:38

## 2024-05-13 RX ADMIN — HYDROCODONE BITARTRATE AND ACETAMINOPHEN 1 TABLET: 5; 325 TABLET ORAL at 01:56

## 2024-05-13 RX ADMIN — LEVOTHYROXINE SODIUM 50 MCG: 0.05 TABLET ORAL at 05:43

## 2024-05-13 RX ADMIN — DEXTROMETHORPHAN HYDROBROMIDE, GUAIFENESIN 10 ML: 10; 100 LIQUID ORAL at 01:59

## 2024-05-13 RX ADMIN — CARVEDILOL 12.5 MG: 12.5 TABLET, FILM COATED ORAL at 18:22

## 2024-05-13 RX ADMIN — PREDNISONE 40 MG: 20 TABLET ORAL at 10:38

## 2024-05-13 RX ADMIN — BENZONATATE 100 MG: 100 CAPSULE ORAL at 05:45

## 2024-05-13 ASSESSMENT — PAIN SCALES - WONG BAKER
WONGBAKER_NUMERICALRESPONSE: NO HURT
WONGBAKER_NUMERICALRESPONSE: HURTS LITTLE MORE
WONGBAKER_NUMERICALRESPONSE: HURTS LITTLE MORE
WONGBAKER_NUMERICALRESPONSE: NO HURT
WONGBAKER_NUMERICALRESPONSE: NO HURT

## 2024-05-13 ASSESSMENT — PAIN SCALES - GENERAL
PAINLEVEL_OUTOF10: 6
PAINLEVEL_OUTOF10: 8
PAINLEVEL_OUTOF10: 7
PAINLEVEL_OUTOF10: 7
PAINLEVEL_OUTOF10: 3
PAINLEVEL_OUTOF10: 7

## 2024-05-13 ASSESSMENT — PAIN DESCRIPTION - DESCRIPTORS
DESCRIPTORS: ACHING
DESCRIPTORS: ACHING;DISCOMFORT
DESCRIPTORS: ACHING
DESCRIPTORS: ACHING;DISCOMFORT
DESCRIPTORS: ACHING;DISCOMFORT

## 2024-05-13 ASSESSMENT — PAIN DESCRIPTION - LOCATION
LOCATION: KNEE

## 2024-05-13 ASSESSMENT — PAIN DESCRIPTION - ORIENTATION
ORIENTATION: RIGHT;LEFT
ORIENTATION: RIGHT;LEFT
ORIENTATION: LEFT;RIGHT
ORIENTATION: RIGHT;LEFT
ORIENTATION: RIGHT;LEFT

## 2024-05-14 PROBLEM — J96.21 ACUTE ON CHRONIC RESPIRATORY FAILURE WITH HYPOXIA AND HYPERCAPNIA (HCC): Status: ACTIVE | Noted: 2024-05-14

## 2024-05-14 PROBLEM — A49.02 MRSA INFECTION: Status: ACTIVE | Noted: 2024-05-14

## 2024-05-14 PROBLEM — J96.22 ACUTE ON CHRONIC RESPIRATORY FAILURE WITH HYPOXIA AND HYPERCAPNIA (HCC): Status: ACTIVE | Noted: 2024-05-14

## 2024-05-14 PROBLEM — J20.1 ACUTE BRONCHITIS DUE TO HAEMOPHILUS INFLUENZAE: Status: ACTIVE | Noted: 2024-05-14

## 2024-05-14 LAB
ANION GAP SERPL CALCULATED.3IONS-SCNC: 6 MMOL/L (ref 3–16)
BACTERIA SPEC RESP CULT: NORMAL
BASOPHILS # BLD: 0 K/UL (ref 0–0.2)
BASOPHILS NFR BLD: 0.3 %
BUN SERPL-MCNC: 29 MG/DL (ref 7–20)
CALCIUM SERPL-MCNC: 9 MG/DL (ref 8.3–10.6)
CHLORIDE SERPL-SCNC: 94 MMOL/L (ref 99–110)
CO2 SERPL-SCNC: 36 MMOL/L (ref 21–32)
CREAT SERPL-MCNC: 0.8 MG/DL (ref 0.6–1.1)
DEPRECATED RDW RBC AUTO: 17.1 % (ref 12.4–15.4)
EOSINOPHIL # BLD: 0 K/UL (ref 0–0.6)
EOSINOPHIL NFR BLD: 0.4 %
GFR SERPLBLD CREATININE-BSD FMLA CKD-EPI: 86 ML/MIN/{1.73_M2}
GLUCOSE BLD-MCNC: 253 MG/DL (ref 70–99)
GLUCOSE BLD-MCNC: 282 MG/DL (ref 70–99)
GLUCOSE BLD-MCNC: 293 MG/DL (ref 70–99)
GLUCOSE BLD-MCNC: 352 MG/DL (ref 70–99)
GLUCOSE SERPL-MCNC: 299 MG/DL (ref 70–99)
GRAM STN SPEC: NORMAL
HCT VFR BLD AUTO: 47.6 % (ref 36–48)
HGB BLD-MCNC: 15 G/DL (ref 12–16)
LYMPHOCYTES # BLD: 0.8 K/UL (ref 1–5.1)
LYMPHOCYTES NFR BLD: 15.4 %
MCH RBC QN AUTO: 26 PG (ref 26–34)
MCHC RBC AUTO-ENTMCNC: 31.6 G/DL (ref 31–36)
MCV RBC AUTO: 82.3 FL (ref 80–100)
MONOCYTES # BLD: 0.3 K/UL (ref 0–1.3)
MONOCYTES NFR BLD: 6.4 %
NEUTROPHILS # BLD: 3.9 K/UL (ref 1.7–7.7)
NEUTROPHILS NFR BLD: 77.5 %
PERFORMED ON: ABNORMAL
PLATELET # BLD AUTO: 135 K/UL (ref 135–450)
PMV BLD AUTO: 7.1 FL (ref 5–10.5)
POTASSIUM SERPL-SCNC: 4.2 MMOL/L (ref 3.5–5.1)
RBC # BLD AUTO: 5.78 M/UL (ref 4–5.2)
SODIUM SERPL-SCNC: 136 MMOL/L (ref 136–145)
WBC # BLD AUTO: 5 K/UL (ref 4–11)

## 2024-05-14 PROCEDURE — 6370000000 HC RX 637 (ALT 250 FOR IP): Performed by: NURSE PRACTITIONER

## 2024-05-14 PROCEDURE — 6370000000 HC RX 637 (ALT 250 FOR IP): Performed by: INTERNAL MEDICINE

## 2024-05-14 PROCEDURE — 2580000003 HC RX 258: Performed by: INTERNAL MEDICINE

## 2024-05-14 PROCEDURE — 6360000002 HC RX W HCPCS: Performed by: HOSPITALIST

## 2024-05-14 PROCEDURE — 80048 BASIC METABOLIC PNL TOTAL CA: CPT

## 2024-05-14 PROCEDURE — 94640 AIRWAY INHALATION TREATMENT: CPT

## 2024-05-14 PROCEDURE — 2580000003 HC RX 258: Performed by: HOSPITALIST

## 2024-05-14 PROCEDURE — 85025 COMPLETE CBC W/AUTO DIFF WBC: CPT

## 2024-05-14 PROCEDURE — 2700000000 HC OXYGEN THERAPY PER DAY

## 2024-05-14 PROCEDURE — 99232 SBSQ HOSP IP/OBS MODERATE 35: CPT | Performed by: INTERNAL MEDICINE

## 2024-05-14 PROCEDURE — 36415 COLL VENOUS BLD VENIPUNCTURE: CPT

## 2024-05-14 PROCEDURE — 2060000000 HC ICU INTERMEDIATE R&B

## 2024-05-14 PROCEDURE — 6370000000 HC RX 637 (ALT 250 FOR IP): Performed by: HOSPITALIST

## 2024-05-14 PROCEDURE — 6360000002 HC RX W HCPCS: Performed by: INTERNAL MEDICINE

## 2024-05-14 PROCEDURE — 99223 1ST HOSP IP/OBS HIGH 75: CPT | Performed by: INTERNAL MEDICINE

## 2024-05-14 PROCEDURE — 94761 N-INVAS EAR/PLS OXIMETRY MLT: CPT

## 2024-05-14 RX ORDER — INSULIN LISPRO 100 [IU]/ML
15 INJECTION, SOLUTION INTRAVENOUS; SUBCUTANEOUS
Status: DISCONTINUED | OUTPATIENT
Start: 2024-05-14 | End: 2024-05-16 | Stop reason: HOSPADM

## 2024-05-14 RX ORDER — LINEZOLID 600 MG/1
600 TABLET, FILM COATED ORAL 2 TIMES DAILY
Status: DISCONTINUED | OUTPATIENT
Start: 2024-05-14 | End: 2024-05-16 | Stop reason: HOSPADM

## 2024-05-14 RX ADMIN — INSULIN LISPRO 16 UNITS: 100 INJECTION, SOLUTION INTRAVENOUS; SUBCUTANEOUS at 17:53

## 2024-05-14 RX ADMIN — ENOXAPARIN SODIUM 40 MG: 100 INJECTION SUBCUTANEOUS at 08:27

## 2024-05-14 RX ADMIN — HYDROCODONE BITARTRATE AND ACETAMINOPHEN 1 TABLET: 5; 325 TABLET ORAL at 15:32

## 2024-05-14 RX ADMIN — CEFTRIAXONE SODIUM 2000 MG: 2 INJECTION, POWDER, FOR SOLUTION INTRAMUSCULAR; INTRAVENOUS at 11:09

## 2024-05-14 RX ADMIN — INSULIN GLARGINE 30 UNITS: 100 INJECTION, SOLUTION SUBCUTANEOUS at 20:23

## 2024-05-14 RX ADMIN — LOSARTAN POTASSIUM 50 MG: 50 TABLET, FILM COATED ORAL at 08:23

## 2024-05-14 RX ADMIN — INSULIN LISPRO 15 UNITS: 100 INJECTION, SOLUTION INTRAVENOUS; SUBCUTANEOUS at 13:16

## 2024-05-14 RX ADMIN — HYDROCODONE BITARTRATE AND ACETAMINOPHEN 1 TABLET: 5; 325 TABLET ORAL at 00:09

## 2024-05-14 RX ADMIN — CARVEDILOL 12.5 MG: 12.5 TABLET, FILM COATED ORAL at 17:53

## 2024-05-14 RX ADMIN — DEXTROMETHORPHAN HYDROBROMIDE, GUAIFENESIN 10 ML: 10; 100 LIQUID ORAL at 08:26

## 2024-05-14 RX ADMIN — BENZONATATE 100 MG: 100 CAPSULE ORAL at 04:03

## 2024-05-14 RX ADMIN — Medication 2 PUFF: at 08:51

## 2024-05-14 RX ADMIN — HYDROCODONE BITARTRATE AND ACETAMINOPHEN 1 TABLET: 5; 325 TABLET ORAL at 20:22

## 2024-05-14 RX ADMIN — LINEZOLID 600 MG: 600 TABLET, FILM COATED ORAL at 20:21

## 2024-05-14 RX ADMIN — LINEZOLID 600 MG: 600 TABLET, FILM COATED ORAL at 08:45

## 2024-05-14 RX ADMIN — BENZONATATE 100 MG: 100 CAPSULE ORAL at 15:33

## 2024-05-14 RX ADMIN — LEVOTHYROXINE SODIUM 50 MCG: 0.05 TABLET ORAL at 05:15

## 2024-05-14 RX ADMIN — INSULIN LISPRO 12 UNITS: 100 INJECTION, SOLUTION INTRAVENOUS; SUBCUTANEOUS at 08:46

## 2024-05-14 RX ADMIN — SODIUM CHLORIDE, PRESERVATIVE FREE 10 ML: 5 INJECTION INTRAVENOUS at 08:27

## 2024-05-14 RX ADMIN — ACETAMINOPHEN 650 MG: 325 TABLET ORAL at 11:10

## 2024-05-14 RX ADMIN — ATORVASTATIN CALCIUM 40 MG: 40 TABLET, FILM COATED ORAL at 20:21

## 2024-05-14 RX ADMIN — CARVEDILOL 12.5 MG: 12.5 TABLET, FILM COATED ORAL at 08:23

## 2024-05-14 RX ADMIN — Medication: at 05:16

## 2024-05-14 RX ADMIN — Medication 2 PUFF: at 19:36

## 2024-05-14 RX ADMIN — HYDROCHLOROTHIAZIDE 12.5 MG: 25 TABLET ORAL at 08:22

## 2024-05-14 RX ADMIN — INSULIN LISPRO 15 UNITS: 100 INJECTION, SOLUTION INTRAVENOUS; SUBCUTANEOUS at 17:54

## 2024-05-14 RX ADMIN — SODIUM CHLORIDE, PRESERVATIVE FREE 10 ML: 5 INJECTION INTRAVENOUS at 20:22

## 2024-05-14 RX ADMIN — DEXTROMETHORPHAN HYDROBROMIDE, GUAIFENESIN 10 ML: 10; 100 LIQUID ORAL at 00:09

## 2024-05-14 RX ADMIN — INSULIN LISPRO 8 UNITS: 100 INJECTION, SOLUTION INTRAVENOUS; SUBCUTANEOUS at 13:16

## 2024-05-14 RX ADMIN — HYDROCODONE BITARTRATE AND ACETAMINOPHEN 1 TABLET: 5; 325 TABLET ORAL at 04:03

## 2024-05-14 RX ADMIN — HYDROCODONE BITARTRATE AND ACETAMINOPHEN 1 TABLET: 5; 325 TABLET ORAL at 08:23

## 2024-05-14 RX ADMIN — PREDNISONE 40 MG: 20 TABLET ORAL at 08:24

## 2024-05-14 RX ADMIN — INSULIN LISPRO 8 UNITS: 100 INJECTION, SOLUTION INTRAVENOUS; SUBCUTANEOUS at 08:46

## 2024-05-14 ASSESSMENT — PAIN - FUNCTIONAL ASSESSMENT
PAIN_FUNCTIONAL_ASSESSMENT: PREVENTS OR INTERFERES SOME ACTIVE ACTIVITIES AND ADLS
PAIN_FUNCTIONAL_ASSESSMENT: PREVENTS OR INTERFERES SOME ACTIVE ACTIVITIES AND ADLS
PAIN_FUNCTIONAL_ASSESSMENT: ACTIVITIES ARE NOT PREVENTED
PAIN_FUNCTIONAL_ASSESSMENT: ACTIVITIES ARE NOT PREVENTED

## 2024-05-14 ASSESSMENT — PAIN DESCRIPTION - ORIENTATION
ORIENTATION: RIGHT;LEFT

## 2024-05-14 ASSESSMENT — PAIN SCALES - GENERAL
PAINLEVEL_OUTOF10: 8
PAINLEVEL_OUTOF10: 7
PAINLEVEL_OUTOF10: 7
PAINLEVEL_OUTOF10: 3
PAINLEVEL_OUTOF10: 7
PAINLEVEL_OUTOF10: 8

## 2024-05-14 ASSESSMENT — PAIN DESCRIPTION - ONSET: ONSET: ON-GOING

## 2024-05-14 ASSESSMENT — PAIN DESCRIPTION - LOCATION
LOCATION: KNEE

## 2024-05-14 ASSESSMENT — PAIN DESCRIPTION - PAIN TYPE: TYPE: CHRONIC PAIN

## 2024-05-14 ASSESSMENT — PAIN SCALES - WONG BAKER
WONGBAKER_NUMERICALRESPONSE: NO HURT

## 2024-05-14 ASSESSMENT — PAIN DESCRIPTION - DESCRIPTORS
DESCRIPTORS: ACHING;DISCOMFORT
DESCRIPTORS: ACHING;DISCOMFORT
DESCRIPTORS: ACHING
DESCRIPTORS: ACHING;DISCOMFORT
DESCRIPTORS: ACHING
DESCRIPTORS: ACHING;DISCOMFORT

## 2024-05-14 ASSESSMENT — PAIN DESCRIPTION - FREQUENCY: FREQUENCY: CONTINUOUS

## 2024-05-14 NOTE — CONSULTS
REASON FOR CONSULTATION/CC: COPD exacerbation      Consult at request of Ken Pacheco MD     PCP: [facility], [facility]      Chief Complaint   Patient presents with    Fatigue     C/o fatigue, cough, and body aches for 1 1/2 weeks       HISTORY OF PRESENT ILLNESS: Lacey Champagne is a 54 y.o. year old female with a history of COPD frequent exacerbations who presents with fatigue with associated cough and myalgias.  Symptoms been present for greater than 48 hours.  Patient was admitted 1 month ago for COPD exacerbation and wound issues.  During my visit patient is on 4 L of oxygen which is not her baseline.  Her blood gases compensated.  She is getting scheduled DuoNebs and IV steroids.      Past Medical History:   Diagnosis Date    Anemia     Asthma     Cerebral artery occlusion with cerebral infarction (HCC)     CHF (congestive heart failure) (HCC)     Chronic venous hypertension (idiopathic) with ulcer of bilateral lower extremity (HCC) 4/24/2023    Has wounds bilateral lower legs    COPD (chronic obstructive pulmonary disease) (HCC)     Diabetes (HCC)     Edema     Edema     Foot ulcer (HCC)     HTN (hypertension), benign 11/19/2013    Hyperlipidemia     PAD (peripheral artery disease) ([facility])     Pressure ulcer of right buttock, stage 3 (HCC) 11/22/2022    Thyroid disease          Past Surgical History:   Procedure Laterality Date    HERNIA REPAIR      TUBAL LIGATION      VASCULAR SURGERY Left     left arterial stent       Family Hx  family history includes COPD in her mother; Cancer in her maternal aunt and maternal grandmother; Early Death in her brother.    Social Hx   reports that she quit smoking about 7 years ago. Her smoking use included cigarettes. She started smoking about 32 years ago. She has a 25.0 pack-year smoking history. She has never used smokeless tobacco.    Scheduled Meds:   losartan  50 mg Oral Daily    And    hydroCHLOROthiazide  12.5 mg Oral Daily    sodium chloride flush  5-40 mL IntraVENous 
with type 2 diabetes mellitus, with fat layer exposed ([facility]) E11.621, L97.522    Leg swelling M79.89    PAD (peripheral artery disease) ([facility]) I73.9    Chronic venous hypertension (idiopathic) with ulcer of bilateral lower extremity ([facility]) I87.313, L97.919, L97.929    Edema R60.9    Localized edema R60.0    Metabolic encephalopathy G93.41    Acute hypoxemic respiratory failure ([facility]) J96.01    COPD exacerbation (HCC) J44.1    Chronic respiratory failure with hypercapnia ([facility]) J96.12    Cellulitis of right foot L03.115    Morbid obesity due to excess calories ([facility]) E66.01    Skin ulcer of toe of right foot with fat layer exposed ([facility]) L97.512    Type 2 diabetes mellitus with right diabetic foot infection ([facility]) E11.628, L08.9    SOB (shortness of breath) R06.02        ICD-10-CM    1. Acute on chronic respiratory failure with hypoxia and hypercapnia ([facility])  J96.21     J96.22       2. COPD exacerbation (HCC)  J44.1       3. Generalized weakness  R53.1          COPD exacerbation  Acute infectious bronchitis  Acute on chronic respiratory failure  BMI 37  COPD  Congestive heart failure  Peripheral vascular disease  Diabetes  MRSA infection  Haemophilus influenza pneumonia molecular panel  Allergy to ciprofloxacin and penicillin and doxycycline      Labs, Microbiology, Radiology and pertinent results from current hospitalization and care every where were reviewed by me as a part of the consultation.    PLAN :  Cont IV Linezolid  600 mg q 12HR FOR  MRSA  Add IV Ceftriaxone x 2 gm Q 24 HR  Resp cx in process  Once better home on oral abx  Contact isolation for MRSA  oN Prednisone now      Discussed with patient/Family and Nursing     Medical Decision Making:  The following items were considered in medical decision making:  Discussion of patient care with other providers  Reviewed clinical lab tests  Reviewed radiology tests  Reviewed other diagnostic tests/interventions  Independent review of radiologic images  Independent review

## 2024-05-15 LAB
ANION GAP SERPL CALCULATED.3IONS-SCNC: 8 MMOL/L (ref 3–16)
BASOPHILS # BLD: 0 K/UL (ref 0–0.2)
BASOPHILS NFR BLD: 0.1 %
BUN SERPL-MCNC: 33 MG/DL (ref 7–20)
CALCIUM SERPL-MCNC: 8.8 MG/DL (ref 8.3–10.6)
CHLORIDE SERPL-SCNC: 93 MMOL/L (ref 99–110)
CO2 SERPL-SCNC: 33 MMOL/L (ref 21–32)
CREAT SERPL-MCNC: 0.8 MG/DL (ref 0.6–1.1)
DEPRECATED RDW RBC AUTO: 16.6 % (ref 12.4–15.4)
EOSINOPHIL # BLD: 0 K/UL (ref 0–0.6)
EOSINOPHIL NFR BLD: 0.6 %
GFR SERPLBLD CREATININE-BSD FMLA CKD-EPI: 86 ML/MIN/{1.73_M2}
GLUCOSE BLD-MCNC: 138 MG/DL (ref 70–99)
GLUCOSE BLD-MCNC: 145 MG/DL (ref 70–99)
GLUCOSE BLD-MCNC: 185 MG/DL (ref 70–99)
GLUCOSE BLD-MCNC: 261 MG/DL (ref 70–99)
GLUCOSE SERPL-MCNC: 252 MG/DL (ref 70–99)
HCT VFR BLD AUTO: 47 % (ref 36–48)
HGB BLD-MCNC: 14.9 G/DL (ref 12–16)
LYMPHOCYTES # BLD: 1 K/UL (ref 1–5.1)
LYMPHOCYTES NFR BLD: 16.8 %
MCH RBC QN AUTO: 25.9 PG (ref 26–34)
MCHC RBC AUTO-ENTMCNC: 31.7 G/DL (ref 31–36)
MCV RBC AUTO: 81.8 FL (ref 80–100)
MONOCYTES # BLD: 0.7 K/UL (ref 0–1.3)
MONOCYTES NFR BLD: 11 %
NEUTROPHILS # BLD: 4.4 K/UL (ref 1.7–7.7)
NEUTROPHILS NFR BLD: 71.5 %
PERFORMED ON: ABNORMAL
PLATELET # BLD AUTO: 147 K/UL (ref 135–450)
PMV BLD AUTO: 7.2 FL (ref 5–10.5)
POTASSIUM SERPL-SCNC: 4.4 MMOL/L (ref 3.5–5.1)
RBC # BLD AUTO: 5.74 M/UL (ref 4–5.2)
SODIUM SERPL-SCNC: 134 MMOL/L (ref 136–145)
WBC # BLD AUTO: 6.2 K/UL (ref 4–11)

## 2024-05-15 PROCEDURE — 6370000000 HC RX 637 (ALT 250 FOR IP): Performed by: NURSE PRACTITIONER

## 2024-05-15 PROCEDURE — 85025 COMPLETE CBC W/AUTO DIFF WBC: CPT

## 2024-05-15 PROCEDURE — 99232 SBSQ HOSP IP/OBS MODERATE 35: CPT | Performed by: INTERNAL MEDICINE

## 2024-05-15 PROCEDURE — 99233 SBSQ HOSP IP/OBS HIGH 50: CPT | Performed by: INTERNAL MEDICINE

## 2024-05-15 PROCEDURE — 6370000000 HC RX 637 (ALT 250 FOR IP): Performed by: HOSPITALIST

## 2024-05-15 PROCEDURE — 6370000000 HC RX 637 (ALT 250 FOR IP): Performed by: INTERNAL MEDICINE

## 2024-05-15 PROCEDURE — 6360000002 HC RX W HCPCS: Performed by: INTERNAL MEDICINE

## 2024-05-15 PROCEDURE — 6360000002 HC RX W HCPCS: Performed by: HOSPITALIST

## 2024-05-15 PROCEDURE — 94669 MECHANICAL CHEST WALL OSCILL: CPT

## 2024-05-15 PROCEDURE — 80048 BASIC METABOLIC PNL TOTAL CA: CPT

## 2024-05-15 PROCEDURE — 2580000003 HC RX 258: Performed by: INTERNAL MEDICINE

## 2024-05-15 PROCEDURE — 36415 COLL VENOUS BLD VENIPUNCTURE: CPT

## 2024-05-15 PROCEDURE — 2700000000 HC OXYGEN THERAPY PER DAY

## 2024-05-15 PROCEDURE — 2060000000 HC ICU INTERMEDIATE R&B

## 2024-05-15 PROCEDURE — 94640 AIRWAY INHALATION TREATMENT: CPT

## 2024-05-15 PROCEDURE — 94761 N-INVAS EAR/PLS OXIMETRY MLT: CPT

## 2024-05-15 PROCEDURE — 2580000003 HC RX 258: Performed by: HOSPITALIST

## 2024-05-15 RX ADMIN — BENZONATATE 100 MG: 100 CAPSULE ORAL at 21:24

## 2024-05-15 RX ADMIN — HYDROCODONE BITARTRATE AND ACETAMINOPHEN 1 TABLET: 5; 325 TABLET ORAL at 00:12

## 2024-05-15 RX ADMIN — LINEZOLID 600 MG: 600 TABLET, FILM COATED ORAL at 08:18

## 2024-05-15 RX ADMIN — CARVEDILOL 12.5 MG: 12.5 TABLET, FILM COATED ORAL at 17:38

## 2024-05-15 RX ADMIN — SODIUM CHLORIDE, PRESERVATIVE FREE 5 ML: 5 INJECTION INTRAVENOUS at 21:24

## 2024-05-15 RX ADMIN — HYDROCODONE BITARTRATE AND ACETAMINOPHEN 1 TABLET: 5; 325 TABLET ORAL at 15:15

## 2024-05-15 RX ADMIN — CEFTRIAXONE SODIUM 2000 MG: 2 INJECTION, POWDER, FOR SOLUTION INTRAMUSCULAR; INTRAVENOUS at 11:18

## 2024-05-15 RX ADMIN — HYDROCHLOROTHIAZIDE 12.5 MG: 25 TABLET ORAL at 09:58

## 2024-05-15 RX ADMIN — DEXTROMETHORPHAN HYDROBROMIDE, GUAIFENESIN 10 ML: 10; 100 LIQUID ORAL at 05:26

## 2024-05-15 RX ADMIN — INSULIN GLARGINE 30 UNITS: 100 INJECTION, SOLUTION SUBCUTANEOUS at 21:25

## 2024-05-15 RX ADMIN — Medication 2 PUFF: at 19:56

## 2024-05-15 RX ADMIN — Medication 2 PUFF: at 08:12

## 2024-05-15 RX ADMIN — DICLOFENAC SODIUM 2 G: 10 GEL TOPICAL at 01:12

## 2024-05-15 RX ADMIN — INSULIN LISPRO 8 UNITS: 100 INJECTION, SOLUTION INTRAVENOUS; SUBCUTANEOUS at 17:58

## 2024-05-15 RX ADMIN — SODIUM CHLORIDE, PRESERVATIVE FREE 10 ML: 5 INJECTION INTRAVENOUS at 08:20

## 2024-05-15 RX ADMIN — LINEZOLID 600 MG: 600 TABLET, FILM COATED ORAL at 21:24

## 2024-05-15 RX ADMIN — BENZONATATE 100 MG: 100 CAPSULE ORAL at 00:19

## 2024-05-15 RX ADMIN — INSULIN LISPRO 15 UNITS: 100 INJECTION, SOLUTION INTRAVENOUS; SUBCUTANEOUS at 17:57

## 2024-05-15 RX ADMIN — ENOXAPARIN SODIUM 40 MG: 100 INJECTION SUBCUTANEOUS at 08:18

## 2024-05-15 RX ADMIN — ALBUTEROL SULFATE 2.5 MG: 2.5 SOLUTION RESPIRATORY (INHALATION) at 19:57

## 2024-05-15 RX ADMIN — INSULIN LISPRO 15 UNITS: 100 INJECTION, SOLUTION INTRAVENOUS; SUBCUTANEOUS at 08:21

## 2024-05-15 RX ADMIN — HYDROCODONE BITARTRATE AND ACETAMINOPHEN 1 TABLET: 5; 325 TABLET ORAL at 20:05

## 2024-05-15 RX ADMIN — INSULIN LISPRO 15 UNITS: 100 INJECTION, SOLUTION INTRAVENOUS; SUBCUTANEOUS at 13:18

## 2024-05-15 RX ADMIN — LEVOTHYROXINE SODIUM 50 MCG: 0.05 TABLET ORAL at 05:26

## 2024-05-15 RX ADMIN — PREDNISONE 40 MG: 20 TABLET ORAL at 08:18

## 2024-05-15 RX ADMIN — ATORVASTATIN CALCIUM 40 MG: 40 TABLET, FILM COATED ORAL at 21:24

## 2024-05-15 RX ADMIN — DEXTROMETHORPHAN HYDROBROMIDE, GUAIFENESIN 10 ML: 10; 100 LIQUID ORAL at 09:59

## 2024-05-15 RX ADMIN — HYDROCODONE BITARTRATE AND ACETAMINOPHEN 1 TABLET: 5; 325 TABLET ORAL at 05:26

## 2024-05-15 RX ADMIN — LOSARTAN POTASSIUM 50 MG: 50 TABLET, FILM COATED ORAL at 09:58

## 2024-05-15 RX ADMIN — CARVEDILOL 12.5 MG: 12.5 TABLET, FILM COATED ORAL at 08:18

## 2024-05-15 RX ADMIN — HYDROCODONE BITARTRATE AND ACETAMINOPHEN 1 TABLET: 5; 325 TABLET ORAL at 09:59

## 2024-05-15 ASSESSMENT — PAIN DESCRIPTION - DESCRIPTORS
DESCRIPTORS: ACHING;DISCOMFORT
DESCRIPTORS: STABBING
DESCRIPTORS: ACHING;DISCOMFORT

## 2024-05-15 ASSESSMENT — PAIN DESCRIPTION - ORIENTATION
ORIENTATION: LEFT
ORIENTATION: RIGHT;LEFT
ORIENTATION: LEFT;RIGHT

## 2024-05-15 ASSESSMENT — PAIN DESCRIPTION - LOCATION
LOCATION: KNEE
LOCATION: KNEE;FOOT
LOCATION: KNEE
LOCATION: FOOT

## 2024-05-15 ASSESSMENT — PAIN DESCRIPTION - PAIN TYPE
TYPE: CHRONIC PAIN
TYPE: CHRONIC PAIN

## 2024-05-15 ASSESSMENT — PAIN SCALES - GENERAL
PAINLEVEL_OUTOF10: 7
PAINLEVEL_OUTOF10: 0
PAINLEVEL_OUTOF10: 7
PAINLEVEL_OUTOF10: 4
PAINLEVEL_OUTOF10: 7
PAINLEVEL_OUTOF10: 7
PAINLEVEL_OUTOF10: 8
PAINLEVEL_OUTOF10: 6

## 2024-05-15 ASSESSMENT — PAIN DESCRIPTION - ONSET
ONSET: ON-GOING
ONSET: ON-GOING

## 2024-05-15 ASSESSMENT — PAIN - FUNCTIONAL ASSESSMENT
PAIN_FUNCTIONAL_ASSESSMENT: PREVENTS OR INTERFERES SOME ACTIVE ACTIVITIES AND ADLS
PAIN_FUNCTIONAL_ASSESSMENT: ACTIVITIES ARE NOT PREVENTED
PAIN_FUNCTIONAL_ASSESSMENT: PREVENTS OR INTERFERES SOME ACTIVE ACTIVITIES AND ADLS

## 2024-05-15 ASSESSMENT — PAIN DESCRIPTION - FREQUENCY
FREQUENCY: CONTINUOUS
FREQUENCY: CONTINUOUS

## 2024-05-16 ENCOUNTER — APPOINTMENT (OUTPATIENT)
Dept: VASCULAR LAB | Age: 54
DRG: 133 | End: 2024-05-16
Attending: INTERNAL MEDICINE
Payer: COMMERCIAL

## 2024-05-16 ENCOUNTER — TELEPHONE (OUTPATIENT)
Dept: INFECTIOUS DISEASES | Age: 54
End: 2024-05-16

## 2024-05-16 VITALS
HEIGHT: 63 IN | BODY MASS INDEX: 37.23 KG/M2 | HEART RATE: 74 BPM | DIASTOLIC BLOOD PRESSURE: 65 MMHG | SYSTOLIC BLOOD PRESSURE: 137 MMHG | RESPIRATION RATE: 19 BRPM | TEMPERATURE: 97.7 F | WEIGHT: 210.1 LBS | OXYGEN SATURATION: 93 %

## 2024-05-16 PROBLEM — J04.0: Status: ACTIVE | Noted: 2024-05-16

## 2024-05-16 PROBLEM — Z78.9 ON SUPPLEMENTAL OXYGEN BY NASAL CANNULA: Status: ACTIVE | Noted: 2024-05-16

## 2024-05-16 PROBLEM — E11.65 POORLY CONTROLLED DIABETES MELLITUS (HCC): Status: ACTIVE | Noted: 2024-05-16

## 2024-05-16 PROBLEM — B96.3: Status: ACTIVE | Noted: 2024-05-16

## 2024-05-16 LAB
ANION GAP SERPL CALCULATED.3IONS-SCNC: 4 MMOL/L (ref 3–16)
BASOPHILS # BLD: 0 K/UL (ref 0–0.2)
BASOPHILS NFR BLD: 0.1 %
BUN SERPL-MCNC: 36 MG/DL (ref 7–20)
CALCIUM SERPL-MCNC: 9.1 MG/DL (ref 8.3–10.6)
CHLORIDE SERPL-SCNC: 95 MMOL/L (ref 99–110)
CO2 SERPL-SCNC: 38 MMOL/L (ref 21–32)
CREAT SERPL-MCNC: 0.9 MG/DL (ref 0.6–1.1)
DEPRECATED RDW RBC AUTO: 17.3 % (ref 12.4–15.4)
ECHO BSA: 2.03 M2
EOSINOPHIL # BLD: 0 K/UL (ref 0–0.6)
EOSINOPHIL NFR BLD: 0.7 %
GFR SERPLBLD CREATININE-BSD FMLA CKD-EPI: 75 ML/MIN/{1.73_M2}
GLUCOSE BLD-MCNC: 176 MG/DL (ref 70–99)
GLUCOSE BLD-MCNC: 188 MG/DL (ref 70–99)
GLUCOSE BLD-MCNC: 200 MG/DL (ref 70–99)
GLUCOSE SERPL-MCNC: 237 MG/DL (ref 70–99)
HCT VFR BLD AUTO: 48.3 % (ref 36–48)
HGB BLD-MCNC: 15.1 G/DL (ref 12–16)
LYMPHOCYTES # BLD: 1.3 K/UL (ref 1–5.1)
LYMPHOCYTES NFR BLD: 19.3 %
MCH RBC QN AUTO: 25.7 PG (ref 26–34)
MCHC RBC AUTO-ENTMCNC: 31.3 G/DL (ref 31–36)
MCV RBC AUTO: 82.3 FL (ref 80–100)
MONOCYTES # BLD: 0.6 K/UL (ref 0–1.3)
MONOCYTES NFR BLD: 9.6 %
NEUTROPHILS # BLD: 4.6 K/UL (ref 1.7–7.7)
NEUTROPHILS NFR BLD: 70.3 %
PERFORMED ON: ABNORMAL
PLATELET # BLD AUTO: 145 K/UL (ref 135–450)
PMV BLD AUTO: 6.9 FL (ref 5–10.5)
POTASSIUM SERPL-SCNC: 4.6 MMOL/L (ref 3.5–5.1)
RBC # BLD AUTO: 5.87 M/UL (ref 4–5.2)
SODIUM SERPL-SCNC: 137 MMOL/L (ref 136–145)
WBC # BLD AUTO: 6.6 K/UL (ref 4–11)

## 2024-05-16 PROCEDURE — 36415 COLL VENOUS BLD VENIPUNCTURE: CPT

## 2024-05-16 PROCEDURE — 85025 COMPLETE CBC W/AUTO DIFF WBC: CPT

## 2024-05-16 PROCEDURE — 2580000003 HC RX 258: Performed by: INTERNAL MEDICINE

## 2024-05-16 PROCEDURE — 93970 EXTREMITY STUDY: CPT | Performed by: SURGERY

## 2024-05-16 PROCEDURE — 6370000000 HC RX 637 (ALT 250 FOR IP): Performed by: HOSPITALIST

## 2024-05-16 PROCEDURE — 80048 BASIC METABOLIC PNL TOTAL CA: CPT

## 2024-05-16 PROCEDURE — 6370000000 HC RX 637 (ALT 250 FOR IP): Performed by: NURSE PRACTITIONER

## 2024-05-16 PROCEDURE — 94640 AIRWAY INHALATION TREATMENT: CPT

## 2024-05-16 PROCEDURE — 6360000002 HC RX W HCPCS: Performed by: HOSPITALIST

## 2024-05-16 PROCEDURE — 94669 MECHANICAL CHEST WALL OSCILL: CPT

## 2024-05-16 PROCEDURE — 6370000000 HC RX 637 (ALT 250 FOR IP): Performed by: INTERNAL MEDICINE

## 2024-05-16 PROCEDURE — 2700000000 HC OXYGEN THERAPY PER DAY

## 2024-05-16 PROCEDURE — 6360000002 HC RX W HCPCS: Performed by: INTERNAL MEDICINE

## 2024-05-16 PROCEDURE — 99232 SBSQ HOSP IP/OBS MODERATE 35: CPT | Performed by: INTERNAL MEDICINE

## 2024-05-16 PROCEDURE — 93970 EXTREMITY STUDY: CPT

## 2024-05-16 PROCEDURE — 94760 N-INVAS EAR/PLS OXIMETRY 1: CPT

## 2024-05-16 PROCEDURE — 2580000003 HC RX 258: Performed by: HOSPITALIST

## 2024-05-16 RX ORDER — BENZONATATE 100 MG/1
100 CAPSULE ORAL 3 TIMES DAILY PRN
Qty: 21 CAPSULE | Refills: 0 | Status: SHIPPED | OUTPATIENT
Start: 2024-05-16 | End: 2024-05-23

## 2024-05-16 RX ORDER — CEFDINIR 300 MG/1
300 CAPSULE ORAL 2 TIMES DAILY
Qty: 10 CAPSULE | Refills: 0 | Status: SHIPPED | OUTPATIENT
Start: 2024-05-16 | End: 2024-05-21

## 2024-05-16 RX ORDER — LINEZOLID 600 MG/1
600 TABLET, FILM COATED ORAL 2 TIMES DAILY
Qty: 7 TABLET | Refills: 0 | Status: SHIPPED | OUTPATIENT
Start: 2024-05-16 | End: 2024-05-16

## 2024-05-16 RX ORDER — PREDNISONE 20 MG/1
40 TABLET ORAL DAILY
Qty: 10 TABLET | Refills: 0 | Status: SHIPPED | OUTPATIENT
Start: 2024-05-17 | End: 2024-05-22

## 2024-05-16 RX ORDER — LINEZOLID 600 MG/1
600 TABLET, FILM COATED ORAL 2 TIMES DAILY
Qty: 10 TABLET | Refills: 0 | Status: SHIPPED | OUTPATIENT
Start: 2024-05-16 | End: 2024-05-21

## 2024-05-16 RX ADMIN — HYDROCODONE BITARTRATE AND ACETAMINOPHEN 1 TABLET: 5; 325 TABLET ORAL at 08:42

## 2024-05-16 RX ADMIN — LINEZOLID 600 MG: 600 TABLET, FILM COATED ORAL at 08:42

## 2024-05-16 RX ADMIN — LEVOTHYROXINE SODIUM 50 MCG: 0.05 TABLET ORAL at 06:22

## 2024-05-16 RX ADMIN — INSULIN LISPRO 15 UNITS: 100 INJECTION, SOLUTION INTRAVENOUS; SUBCUTANEOUS at 17:48

## 2024-05-16 RX ADMIN — CARVEDILOL 12.5 MG: 12.5 TABLET, FILM COATED ORAL at 08:42

## 2024-05-16 RX ADMIN — INSULIN LISPRO 4 UNITS: 100 INJECTION, SOLUTION INTRAVENOUS; SUBCUTANEOUS at 09:23

## 2024-05-16 RX ADMIN — PREDNISONE 40 MG: 20 TABLET ORAL at 08:42

## 2024-05-16 RX ADMIN — CEFTRIAXONE SODIUM 2000 MG: 2 INJECTION, POWDER, FOR SOLUTION INTRAMUSCULAR; INTRAVENOUS at 11:19

## 2024-05-16 RX ADMIN — INSULIN LISPRO 15 UNITS: 100 INJECTION, SOLUTION INTRAVENOUS; SUBCUTANEOUS at 09:23

## 2024-05-16 RX ADMIN — HYDROCODONE BITARTRATE AND ACETAMINOPHEN 1 TABLET: 5; 325 TABLET ORAL at 04:40

## 2024-05-16 RX ADMIN — HYDROCODONE BITARTRATE AND ACETAMINOPHEN 1 TABLET: 5; 325 TABLET ORAL at 00:32

## 2024-05-16 RX ADMIN — LOSARTAN POTASSIUM 50 MG: 50 TABLET, FILM COATED ORAL at 08:42

## 2024-05-16 RX ADMIN — Medication 2 PUFF: at 08:31

## 2024-05-16 RX ADMIN — HYDROCHLOROTHIAZIDE 12.5 MG: 25 TABLET ORAL at 08:42

## 2024-05-16 RX ADMIN — INSULIN LISPRO 15 UNITS: 100 INJECTION, SOLUTION INTRAVENOUS; SUBCUTANEOUS at 12:37

## 2024-05-16 RX ADMIN — ENOXAPARIN SODIUM 40 MG: 100 INJECTION SUBCUTANEOUS at 08:45

## 2024-05-16 RX ADMIN — HYDROCODONE BITARTRATE AND ACETAMINOPHEN 1 TABLET: 5; 325 TABLET ORAL at 16:45

## 2024-05-16 RX ADMIN — SODIUM CHLORIDE, PRESERVATIVE FREE 10 ML: 5 INJECTION INTRAVENOUS at 08:45

## 2024-05-16 RX ADMIN — BENZONATATE 100 MG: 100 CAPSULE ORAL at 04:40

## 2024-05-16 RX ADMIN — CARVEDILOL 12.5 MG: 12.5 TABLET, FILM COATED ORAL at 17:47

## 2024-05-16 ASSESSMENT — PAIN DESCRIPTION - ORIENTATION
ORIENTATION: RIGHT;LEFT
ORIENTATION: RIGHT;LEFT
ORIENTATION: RIGHT
ORIENTATION: RIGHT;LEFT

## 2024-05-16 ASSESSMENT — PAIN DESCRIPTION - FREQUENCY
FREQUENCY: CONTINUOUS
FREQUENCY: CONTINUOUS

## 2024-05-16 ASSESSMENT — PAIN - FUNCTIONAL ASSESSMENT
PAIN_FUNCTIONAL_ASSESSMENT: ACTIVITIES ARE NOT PREVENTED

## 2024-05-16 ASSESSMENT — PAIN DESCRIPTION - LOCATION
LOCATION: KNEE

## 2024-05-16 ASSESSMENT — PAIN DESCRIPTION - ONSET
ONSET: ON-GOING
ONSET: ON-GOING

## 2024-05-16 ASSESSMENT — PAIN DESCRIPTION - DESCRIPTORS
DESCRIPTORS: ACHING
DESCRIPTORS: ACHING;DISCOMFORT
DESCRIPTORS: ACHING;DISCOMFORT

## 2024-05-16 ASSESSMENT — PAIN SCALES - GENERAL
PAINLEVEL_OUTOF10: 7
PAINLEVEL_OUTOF10: 7
PAINLEVEL_OUTOF10: 4
PAINLEVEL_OUTOF10: 4
PAINLEVEL_OUTOF10: 8
PAINLEVEL_OUTOF10: 8
PAINLEVEL_OUTOF10: 5
PAINLEVEL_OUTOF10: 5

## 2024-05-16 ASSESSMENT — PAIN DESCRIPTION - PAIN TYPE
TYPE: CHRONIC PAIN
TYPE: CHRONIC PAIN

## 2024-05-16 NOTE — TELEPHONE ENCOUNTER
Received Prior Authorization request from [facility].  PA needed for Linezolid 600mg    Key: [id number]

## 2024-05-16 NOTE — DISCHARGE INSTR - COC
Therapy:  Wound 04/11/24 Left Top of 4th Toe (Active)   Dressing Status Other (Comment) 05/15/24 2002   Wound Cleansed Not Cleansed 05/15/24 2002   Dressing/Treatment Other (comment) 04/13/24 0821   Wound Assessment Dry 05/15/24 2002   Drainage Amount None (dry) 05/15/24 2002   Odor None 04/13/24 0821   Lyssa-wound Assessment Intact 05/15/24 2002   Margins Defined edges 04/13/24 0821   Number of days: 34       Wound 04/11/24 Right Lateral great toe (Active)   Wound Etiology Diabetic 05/15/24 2002   Dressing Status Dry;Intact 05/15/24 2002   Wound Cleansed Not Cleansed 05/15/24 2002   Dressing/Treatment Pharmaceutical agent (see MAR);Roll gauze;Coban/self-adherent bandages 05/15/24 0017   Wound Assessment Other (Comment) 05/15/24 2002   Drainage Amount None (dry) 05/15/24 2002   Drainage Description Serous 04/12/24 1100   Odor None 04/13/24 0821   Lyssa-wound Assessment Other (Comment) 04/13/24 0821   Margins Defined edges 04/13/24 0821   Number of days: 34        Elimination:  Continence:   Bowel: {YES / NO:19727}  Bladder: {YES / NO:19727}  Urinary Catheter: {Urinary Catheter:866108068}   Colostomy/Ileostomy/Ileal Conduit: {YES / NO:19727}       Date of Last BM: ***    Intake/Output Summary (Last 24 hours) at 5/16/2024 1009  Last data filed at 5/16/2024 0205  Gross per 24 hour   Intake 480 ml   Output --   Net 480 ml     I/O last 3 completed shifts:  In: 720 [P.O.:720]  Out: -     Safety Concerns:     { TORRIE Safety Concerns:829218324}    Impairments/Disabilities:      { TORRIE Impairments/Disabilities:549907876}    Nutrition Therapy:  Current Nutrition Therapy:   { TORRIE Diet List:054533203}    Routes of Feeding: {CHP DME Other Feedings:342669007}  Liquids: {Slp liquid thickness:77296}  Daily Fluid Restriction: {CHP DME Yes amt example:270267773}  Last Modified Barium Swallow with Video (Video Swallowing Test): {Done Not Done Date:304088012}    Treatments at the Time of Hospital Discharge:   Respiratory Treatments:

## 2024-05-16 NOTE — PROGRESS NOTES
Hospitalist Progress Note      PCP: Direct, [facility]    Date of Admission: 5/11/2024    Subjective: still with cough, SOB better, on 4L O2    Medications:  Reviewed    Infusion Medications    sodium chloride Stopped (05/13/24 5775)    dextrose       Scheduled Medications    linezolid  600 mg Oral BID    insulin lispro  15 Units SubCUTAneous TID WC    cefTRIAXone (ROCEPHIN) IV  2,000 mg IntraVENous Q24H    predniSONE  40 mg Oral Daily    insulin glargine  30 Units SubCUTAneous Nightly    losartan  50 mg Oral Daily    And    hydroCHLOROthiazide  12.5 mg Oral Daily    mometasone-formoterol  2 puff Inhalation BID RT    sodium chloride flush  5-40 mL IntraVENous 2 times per day    enoxaparin  40 mg SubCUTAneous Daily    atorvastatin  40 mg Oral Nightly    levothyroxine  50 mcg Oral QAM AC    insulin lispro  0-16 Units SubCUTAneous TID WC    insulin lispro  0-4 Units SubCUTAneous Nightly    carvedilol  12.5 mg Oral BID WC     PRN Meds: guaiFENesin-dextromethorphan, HYDROcodone 5 mg - acetaminophen, benzonatate, sodium chloride flush, sodium chloride, potassium chloride **OR** potassium alternative oral replacement **OR** potassium chloride, magnesium sulfate, ondansetron **OR** ondansetron, polyethylene glycol, acetaminophen **OR** acetaminophen, albuterol, ipratropium, glucose, dextrose bolus **OR** dextrose bolus, glucagon (rDNA), dextrose, medihoney wound/burn dressing      Intake/Output Summary (Last 24 hours) at 5/14/2024 1436  Last data filed at 5/14/2024 1353  Gross per 24 hour   Intake 600 ml   Output --   Net 600 ml       Physical Exam Performed:    BP (!) 152/80   Pulse 86   Temp 98.2 °F (36.8 °C) (Oral)   Resp 18   Ht 1.6 m (5' 3\")   Wt 95.4 kg (210 lb 5.1 oz)   SpO2 90%   BMI 37.26 kg/m²     General: awake, alert, in NAD  Eyes: EOMI  ENT: neck supple  Cardiovascular: Regular rate.  Respiratory: Clear to auscultation  Gastrointestinal: Soft, non tender, BS+  Genitourinary: no suprapubic 
    V2.0    [facility] Progress Note      Name:  Lacey Champagne /Age/Sex: 1970  (54 y.o. female)   MRN & CSN:  [id number] & [id number] Encounter Date/Time: 2024 12:48 PM EDT   Location:  [id number] PCP: Hortensia, [facility]     Attending:Ken Pacheco MD       Hospital Day: 3    Assessment and Recommendations       Patient is a 54-year old morbidly obese female past medical history of chronic hypoxic and hypercapnic respiratory failure on 2 L nasal cannula, hypertension, hyperlipidemia, hypothyroidism, COPD, diabetes who presented to ED as a transfer from [place] [facility] presented there with shortness of breath.      #.  Acute on chronic hypoxic and hypercapnic aspiratory failure  #.  Acute exacerbation of COPD  #.  CO2 narcosis  #.  Diabetes mellitus, type II with hyperglycemia  #.  Hypothyroidism likely acquired  #.  Other medical problem include hyperlipidemia, hypertension, morbid history     Plan:  Procal and comprehensive   viral panel negative  Repiratory panel positive for staph aureus and H. influenza  Continue bronchodilators and wean off oxygen as tolerated  Switch antibiotics to zyvox. Continue steroids.   BiPAP at night and as needed  Continue to 25 units, lispro with meals and continue sliding scale  Continue home levothyroxine for hypothyroidism  Continue home medication for chronic medical issues.  Pulm/ccm following.      DVT Prophylaxis: lovenox  Code status: full support  Barrier to DC. Acute on chronic hypoxia.           Subjective:     Patient was seen and examined today. Reports improvement in symptoms. No acute events overnight.     Review of Systems:      Pertinent positives and negatives discussed in HPI    Objective:   No intake or output data in the 24 hours ending 24 1130     Vitals:   Vitals:    24 0825 24 0838 24 1038 24 1113   BP:    (!) 160/68   Pulse: 69 81  89   Resp:  18 19 15   Temp:    98 °F (36.7 °C)   TempSrc:    Oral   SpO2:  90%  92% 
    V2.0    [facility] Progress Note      Name:  Lacey Champagne /Age/Sex: 1970  (54 y.o. female)   MRN & CSN:  [id number] & [id number] Encounter Date/Time: 2024 12:48 PM EDT   Location:  [id number] PCP: Hortensia, [facility]     Attending:Ken Pacheco MD       Hospital Day: 2    Assessment and Recommendations       Patient is a 54-year old morbidly obese female past medical history of chronic hypoxic and hypercapnic respiratory failure on 2 L nasal cannula, hypertension, hyperlipidemia, hypothyroidism, COPD, diabetes who presented to ED as a transfer from [place] [facility] presented there with shortness of breath.      #.  Acute on chronic hypoxic and hypercapnic aspiratory failure  #.  Acute exacerbation of COPD  #.  CO2 narcosis  #.  Diabetes mellitus, type II with hyperglycemia  #.  Hypothyroidism likely acquired  #.  Other medical problem include hyperlipidemia, hypertension, morbid history     Plan:  Procal and comprehensive   viral panel negative  Continue bronchodilators and wean off oxygen as tolerated  Continue azithromycin and IV steroids  BiPAP at night and as needed  Increase Lantus to 25 units, start lispro with meals and continue sliding scale  Continue home levothyroxine for hypothyroidism  Continue home medication for chronic medical issues.  Pulm/ccm following.      DVT Prophylaxis: lovenox  Code status: full support  Barrier to DC. Acute on chronic hypoxia.           Subjective:     Patient was seen and examined today. Reports improvement in symptoms. No acute events overnight.     Review of Systems:      Pertinent positives and negatives discussed in HPI    Objective:     Intake/Output Summary (Last 24 hours) at 2024 1248  Last data filed at 2024  Gross per 24 hour   Intake 300 ml   Output --   Net 300 ml      Vitals:   Vitals:    24 0308 24 0403 24 0757 24 1201   BP: 133/60   133/67   Pulse: 82 72 80 78   Resp: 16 17 20 13   Temp: 97.8 °F (36.6 °C)   
  Pulmonary Progress Note    Date of Admission: 5/11/2024   LOS: 2 days     CC:  Chief Complaint   Patient presents with    Fatigue     C/o fatigue, cough, and body aches for 1 1/2 weeks           Assessment/Plan     Acute exacerbation of COPD, due to haemophilus  Mucous plugging  -Transition to p.o. prednisone  -Scheduled DuoNebs and Dulera  -Acapella/I-S, PT/OT  -Pneumonia panel also with MRSA the only 10,000 CFU could be a colonizer would monitor for response to H. influenzae treatment.  Will see if it grows on culture     Acute on chronic hypoxemia  -Improving; wean oxygen goal saturation 90%     Chronic hypercapnic respiratory failure  -Blood gas compensated    HPI/Subjective  No acute events overnight.  Oxygen requirement improving down to 4    ROS:   No nausea  No Vomiting  No chest pain    No intake or output data in the 24 hours ending 05/13/24 0915      PHYSICAL EXAM:   Blood pressure (!) 152/72, pulse 81, temperature 97.3 °F (36.3 °C), temperature source Axillary, resp. rate 18, height 1.6 m (5' 3\"), weight 94.9 kg (209 lb 3.2 oz), SpO2 90 %.'  Gen:  No acute distress.   Resp:  No crackles. No wheezes. No rhonchi. No dullness on percussion.  CV: Regular rate. Regular rhythm. No murmur or rub. No edema.   M/S: No cyanosis. No clubbing.    Neuro: Awake alert oriented conversant.          Labs reviewed:  CBC:   Recent Labs     05/11/24  0840   WBC 5.8   HGB 15.1   HCT 48.8*   MCV 83.4   *     BMP:   Recent Labs     05/11/24  0840      K 4.1   CL 95*   CO2 36*   BUN 16   CREATININE 0.7     LIVER PROFILE:   Recent Labs     05/11/24  0840   AST 14*   ALT 14   BILITOT 0.8   ALKPHOS 102     PT/INR: No results for input(s): \"PROTIME\", \"INR\" in the last 72 hours.  APTT: No results for input(s): \"APTT\" in the last 72 hours.  UA:No results for input(s): \"NITRITE\", \"COLORU\", \"PHUR\", \"LABCAST\", \"WBCUA\", \"RBCUA\", \"MUCUS\", \"TRICHOMONAS\", \"YEAST\", \"BACTERIA\", \"CLARITYU\", \"SPECGRAV\", \"LEUKOCYTESUR\", 
  Pulmonary Progress Note    Date of Admission: 5/11/2024   LOS: 3 days     CC:  Chief Complaint   Patient presents with    Fatigue     C/o fatigue, cough, and body aches for 1 1/2 weeks           Assessment/Plan     Acute exacerbation of COPD, due to haemophilus  Mucous plugging  -Prednisone x 5 days  -Scheduled DuoNebs and Dulera  -Acapella/I-S, PT/OT     chronic hypoxemia and hypercapnic respiratory failure  -wean oxygen goal saturation 90%  -Blood gas compensated    HPI/Subjective  No acute events overnight.  Oxygen requirement improving down to 4    ROS:   No nausea  No Vomiting  No chest pain      Intake/Output Summary (Last 24 hours) at 5/14/2024 0903  Last data filed at 5/13/2024 1137  Gross per 24 hour   Intake 240 ml   Output --   Net 240 ml         PHYSICAL EXAM:   Blood pressure (!) 151/66, pulse 75, temperature 97.8 °F (36.6 °C), temperature source Oral, resp. rate 20, height 1.6 m (5' 3\"), weight 95.4 kg (210 lb 5.1 oz), SpO2 93 %.'  Gen:  No acute distress.   Resp:  No crackles. No wheezes. No rhonchi. No dullness on percussion.  CV: Regular rate. Regular rhythm. No murmur or rub. No edema.   M/S: No cyanosis. No clubbing.    Neuro: Awake alert oriented conversant.          Labs reviewed:  CBC:   No results for input(s): \"WBC\", \"HGB\", \"HCT\", \"MCV\", \"PLT\" in the last 72 hours.    BMP:   No results for input(s): \"NA\", \"K\", \"CL\", \"CO2\", \"PHOS\", \"BUN\", \"CREATININE\" in the last 72 hours.    Invalid input(s): \"CA\"    LIVER PROFILE:   No results for input(s): \"AST\", \"ALT\", \"LIPASE\", \"AMYLASE\", \"BILIDIR\", \"BILITOT\", \"ALKPHOS\" in the last 72 hours.    Invalid input(s): \"ALB\"    PT/INR: No results for input(s): \"PROTIME\", \"INR\" in the last 72 hours.  APTT: No results for input(s): \"APTT\" in the last 72 hours.  UA:No results for input(s): \"NITRITE\", \"COLORU\", \"PHUR\", \"LABCAST\", \"WBCUA\", \"RBCUA\", \"MUCUS\", \"TRICHOMONAS\", \"YEAST\", \"BACTERIA\", \"CLARITYU\", \"SPECGRAV\", \"LEUKOCYTESUR\", \"UROBILINOGEN\", \"BILIRUBINUR\", 
  Pulmonary Progress Note    Date of Admission: 5/11/2024   LOS: 4 days     CC:  Chief Complaint   Patient presents with    Fatigue     C/o fatigue, cough, and body aches for 1 1/2 weeks           Assessment/Plan     Acute exacerbation of COPD, due to haemophilus  Mucous plugging  -Prednisone x 5 days  -Scheduled DuoNebs and Dulera  -Acapella/I-S, PT/OT  -Ceftriaxone/linezolid     chronic hypoxemia and hypercapnic respiratory failure  -wean oxygen goal saturation 90%  -Blood gas compensated    Future Appointments   Date Time Provider Department Center   5/20/2024  8:20 AM Camilo Lira,   PULM [facility]     No further inpatient recommendations, we will sign off at this time.  Please let us know if we can be of any further assistance.     HPI/Subjective  No acute events overnight.  Oxygen requirement continues to improve down to 2 L.    ROS:   No nausea  No Vomiting  No chest pain      Intake/Output Summary (Last 24 hours) at 5/15/2024 0831  Last data filed at 5/14/2024 1353  Gross per 24 hour   Intake 600 ml   Output --   Net 600 ml         PHYSICAL EXAM:   Blood pressure 125/72, pulse 68, temperature 97.3 °F (36.3 °C), temperature source Axillary, resp. rate 19, height 1.6 m (5' 3\"), weight 95.3 kg (210 lb 1.6 oz), SpO2 90 %.'  Gen:  No acute distress.   Resp:  No crackles. No wheezes. No rhonchi. No dullness on percussion.  CV: Regular rate. Regular rhythm. No murmur or rub. No edema.   M/S: No cyanosis. No clubbing.    Neuro: Awake alert oriented conversant.          Labs reviewed:  CBC:   Recent Labs     05/14/24  1008 05/15/24  0456   WBC 5.0 6.2   HGB 15.0 14.9   HCT 47.6 47.0   MCV 82.3 81.8    147       BMP:   Recent Labs     05/14/24  1008 05/15/24  0456    134*   K 4.2 4.4   CL 94* 93*   CO2 36* 33*   BUN 29* 33*   CREATININE 0.8 0.8       LIVER PROFILE:   No results for input(s): \"AST\", \"ALT\", \"LIPASE\", \"AMYLASE\", \"BILIDIR\", \"BILITOT\", \"ALKPHOS\" in the last 72 hours.    Invalid 
4 Eyes Skin Assessment     NAME:  Lacey Champagne  YOB: 1970  MEDICAL RECORD NUMBER:  [id number]    The patient is being assessed for  Admission    I agree that at least one RN has performed a thorough Head to Toe Skin Assessment on the patient. ALL assessment sites listed below have been assessed.      Areas assessed by both nurses:    Head, Face, Ears, Shoulders, Back, Chest, Arms, Elbows, Hands, Sacrum. Buttock, Coccyx, Ischium, Legs. Feet and Heels, and Under Medical Devices         Does the Patient have a Wound? Yes wound(s) were present on assessment. LDA wound assessment was Initiated and completed by RN       Jose Prevention initiated by RN: No  Wound Care Orders initiated by RN: No, pt has wound care plan upon admission    Pressure Injury (Stage 3,4, Unstageable, DTI, NWPT, and Complex wounds) if present, place Wound referral order by RN under : No    New Ostomies, if present place, Ostomy referral order under : No     Nurse 1 eSignature: Electronically signed by Neida Azul RN on 5/11/24 at 3:59 PM EDT    **SHARE this note so that the co-signing nurse can place an eSignature**    Nurse 2 eSignature: Electronically signed by Chuyita Cool RN on 5/13/24 at 6:35 AM EDT   
CLINICAL PHARMACY NOTE: MEDS TO BEDS       Medication Assistance Voucher  Patient: Lacey Champagne  YOB: 1970    Floor/Unit:[id number]  Discharge Date: 5/16/2024   Approver Name/Title:    Cost Center to be cross-charged: [id number]  Medications Approved : CLINICAL PHARMACY NOTE: MEDS TO BEDS    Total # of Prescriptions Filled: 1   The following medications were delivered to the patient:  Current Discharge Medication List        START taking these medications    Details                        linezolid (ZYVOX) 600 MG tablet Take 1 tablet by mouth in the morning and at bedtime for 5 days  Qty: 10 tablet, Refills: 0             Additional Documentation:      By signing below, I attest that I have the authority to authorize medication assistance for the patient and medications listed above. I understand that the cost center listed above will be cross-charged for the total cost of the drugs dispensed.  Pharmacy Instructions   Bill authorized prescriptions listed above to third party plan “[facility]”  o ID: Approver’s last name  o Group: Cost center of the unit/department that authorized medication assistance   Retain this document for future reference    
EDSBAR report has been reviewed.      Electronically signed by Neida Azul RN on 5/11/2024 at 9:54 AM     
Late post due to pt care - Pt's HS BG elevated at 385. Provider on-call notified, no orders for additional insulin at this time. Care ongoing. Electronically signed by Chuyita Cool RN on 5/13/2024 at 1:08 AM     Latest Reference Range & Units Most Recent   POC Glucose 70 - 99 mg/dl 385 (H)  5/12/24 20:19       
NIV initiated as per MD order as follows:     05/11/24 2337   NIV Type   NIV Started/Stopped On   Equipment Type V60   Mode Bilevel   Mask Type Full face mask   Mask Size Medium   Assessment   Pulse 89   Respirations 17   SpO2 94 %   Comfort Level Good   Using Accessory Muscles No   Mask Compliance Good   Skin Assessment Clean, dry, & intact   Skin Protection for O2 Device Yes   Orientation Middle   Location Nose   Intervention(s) Skin Barrier   Settings/Measurements   PIP Observed 12 cm H20   IPAP 12 cmH20   CPAP/EPAP 5 cmH2O   Vt (Measured) 488 mL   Rate Ordered 16   Insp Rise Time (%) 3 %   FiO2  50 %   I Time/ I Time % 1 s   Minute Volume (L/min) 10.6 Liters   Mask Leak (lpm) 5 lpm   Patient's Home Machine No   Alarm Settings   Alarms On Y   Low Pressure (cmH2O) 4 cmH2O   High Pressure (cmH2O) 30 cmH2O   Delay Alarm 30 sec(s)   RR Low (bpm) 10   RR High (bpm) 50 br/min   Oxygen Therapy/Pulse Ox   O2 Therapy Oxygen   O2 Device PAP (positive airway pressure)   Pulse Oximeter Device Mode Continuous   Pulse Oximeter Device Location Finger     Pt tolerating better than expected, as patient expressed concerns regarding claustrophobia. Pt resting comfortably in chair at this time, will monitor.    Electronically signed by Panda Aguayo RCP on 5/11/2024 at 11:44 PM    
PT declined to wear BIPAP At this time  
Patient continued to refuse chair alarm throughout shift. Patient provided education on importance of safety. Patient continued to refuse alarm.  
Patient discharging today. IV removed, no complications, dressing applied. Paperwork reviewed with patient including medications and follow up appointments. All questions answered. Meds delivered to bedside. Patient and belongings being transported to family car via wheelchair.  
Patient here from [place] ED via stretcher. Pt arrived on 5L, but with exterion she had to be increased to 7L, currently down to 6L. Placed on tele. VS taken. Patient oriented to unit and room. Head to toe assessment completed. Pt refused chair and/or bed alarm. She was educated on the fall risks presented if she refuses alarms. She states she does not need them. Wishes honored. Call light within reach.  Electronically signed by Neida Azul RN on 5/11/2024 at 2:07 PM   
Patient refusing chair alarm. Patient educated on fall risk. Patient continuing to refuse alarm. Patient educated on use of call light and fall precautions.   
Pt continues to refuse chair alarm this shift. This RN entered pt's room to find her up in the bathroom. Pt again instructed by this RN to use her call light before attempting to ambulate out of the chair. Pt states, \"If I fall, then that's my fault and I'll tell them that! I get around on my own at home and I'm doing it here\" Pt reeducated by this RN on her fall risk and use of call light and fall precautions. Care ongoing. Electronically signed by Chuyita oCol RN on 5/14/2024 at 8:08 AM    
Pt not ready for BIPAP at this time, PT says she will call when she wants to wear  
Pt refused to take carvedilol at original scheduled time, states she normally takes it at night. Pt educated that this medication is recommended to take with foods, however she still wanted to take at night. Wishes honored. Messages sent to pharmacy to reschedule medication. Awaiting response.  Electronically signed by Neida Azul RN on 5/11/2024 at 6:16 PM   
Pt requesting to be placed on BiPAP at 5:45 this morning. Tolerating well and sleeping in the chair at this time.     Pt is a SBA due to increased oxygen demand. She is independent at home. She spends the entirety of each shift in the chair and continues to refuse a chair alarm. Pt offered a chair cushion to reduce pressure to her buttocks, pt refusing at this time. Pt does reposition herself frequently while in the chair. Pt educated by this RN regarding fall risk and use of call light. Pt promises to call before attempting to ambulate. Care ongoing. Electronically signed by Chuyita Cool RN on 5/13/2024 at 6:24 AM    
Pt says she is not ready for BIPAP at this time, and will let us know when she is or if she changes her mind.     
assist device Independently.  No deviations or LOB; denies sob.  Pt able to manage O2 line safely/without difficulty.  Comments: Sats remain at least 90% (4L).               AM-PAC - Mobility    AM-PAC Basic Mobility - Inpatient   How much help is needed turning from your back to your side while in a flat bed without using bedrails?: None  How much help is needed moving from lying on your back to sitting on the side of a flat bed without using bedrails?: None  How much help is needed moving to and from a bed to a chair?: None  How much help is needed standing up from a chair using your arms?: None  How much help is needed walking in hospital room?: None  How much help is needed climbing 3-5 steps with a railing?: None  AM-PAC Inpatient Mobility Raw Score : 24  AM-PAC Inpatient T-Scale Score : 61.14  Mobility Inpatient CMS 0-100% Score: 0  Mobility Inpatient CMS G-Code Modifier : CH       Goals  Short Term Goals  Time Frame for Short Term Goals: No Acute Care PT Goals Identified.  Patient Goals   Patient Goals : Return home.       Education  Patient Education  Education Given To: Patient  Education Provided Comments: Role of PT, POC, Need to call for assist.  Education Method: Verbal  Barriers to Learning: None  Education Outcome: Verbalized understanding      Therapy Time   Individual Concurrent Group Co-treatment   Time In 0840         Time Out 0905         Minutes 25                 Tri Rasmussen, PT         
hyperglycemia  #.  Hypothyroidism likely acquired  #.  Other medical problem include hyperlipidemia, hypertension, morbid history       Plan:  Procal and viral panel negative  Repiratory panel positive for staph aureus/MRSA and H. influenza  Continue bronchodilators and wean off oxygen as tolerated  Switched antibiotics to zyvox. Continue steroids. Added ceftriaxone  Consulted ID, appr recs  BiPAP at night and as needed  Continue to 25 units, lispro with meals and continue sliding scale  Continue home levothyroxine for hypothyroidism  Continue home medication for chronic medical issues.  Pulm following.        Diet: ADULT DIET; Regular; 4 carb choices (60 gm/meal); Low Sodium (2 gm)  Code Status: Full Code  PT/OT Eval Status: ordered    Dispo - cont care, dc in am      Sanaz Sanchez MD   
with laundry.)  Ambulation Assistance: Independent  Transfer Assistance: Independent  Active : Yes (Relies on family; able to drive although doesn't have a car.)  Occupation: Retired, On disability       Objective     Safety Devices  Type of Devices: Left in chair;Call light within reach;Nurse notified  Restraints  Restraints Initially in Place: No  Bed Mobility Training  Bed Mobility Training: No  Transfer Training  Transfer Training: Yes  Overall Level of Assistance: Independent  Sit to Stand: Independent  Stand to Sit: Independent  Bed to Chair: Independent  Toilet Transfer: Independent  Gait  Gait Training: Yes (no LOB; no SOB; no reports of light headedness/dizziness)  Overall Level of Assistance: Independent  Distance (ft): 25 Feet (25ft, 50ft)     AROM: Within functional limits  PROM: Within functional limits  Strength: Within functional limits  Coordination: Within functional limits  Tone: Normal  Sensation: Intact  ADL  Grooming: Independent (in stance at sink to wash hands)  Toileting: Independent  Functional Mobility: Independent  Additional Comments: Anticipate pt Ind with all ADLs based on ROM, strength, and balance              Vision  Vision: Within Functional Limits  Cognition  Overall Cognitive Status: WFL  Orientation  Overall Orientation Status: Within Functional Limits                  Education Given To: Patient  Education Provided: Role of Therapy;Plan of Care;Transfer Training;ADL Adaptive Strategies  Education Method: Demonstration;Verbal  Barriers to Learning: None  Education Outcome: Verbalized understanding;Demonstrated understanding                    AM-PAC - ADL  AM-PAC Daily Activity - Inpatient   How much help is needed for putting on and taking off regular lower body clothing?: None  How much help is needed for bathing (which includes washing, rinsing, drying)?: None  How much help is needed for toileting (which includes using toilet, bedpan, or urinal)?: None  How much help is 
left foot associated with type 2 diabetes mellitus, with fat layer exposed ([facility]) E11.621, L97.522    Leg swelling M79.89    PAD (peripheral artery disease) ([facility]) I73.9    Chronic venous hypertension (idiopathic) with ulcer of bilateral lower extremity ([facility]) I87.313, L97.919, L97.929    Edema R60.9    Localized edema R60.0    Metabolic encephalopathy G93.41    Acute hypoxemic respiratory failure ([facility]) J96.01    COPD exacerbation ([facility]) J44.1    Chronic respiratory failure with hypercapnia ([facility]) J96.12    Cellulitis of right foot L03.115    Morbid obesity due to excess calories ([facility]) E66.01    Skin ulcer of toe of right foot with fat layer exposed ([facility]) L97.512    Type 2 diabetes mellitus with right diabetic foot infection ([facility]) E11.628, L08.9    SOB (shortness of breath) R06.02    Acute on chronic respiratory failure with hypoxia and hypercapnia (Formerly [facility]) J96.21, J96.22    Acute bronchitis due to Haemophilus influenzae J20.1    MRSA infection A49.02        ICD-10-CM    1. Acute on chronic respiratory failure with hypoxia and hypercapnia (Formerly [facility])  J96.21     J96.22       2. COPD exacerbation (Formerly [facility])  J44.1       3. Generalized weakness  R53.1          COPD exacerbation  Acute infectious bronchitis  Acute on chronic respiratory failure  BMI 37  COPD  Congestive heart failure  Peripheral vascular disease  Diabetes  MRSA infection  Haemophilus influenza pneumonia molecular panel  Allergy to ciprofloxacin and penicillin and doxycycline      Able to wean down to nasal cannula 2 L tolerating antibiotic therapy okay  Respiratory culture normal piero pneumonia panel noted Staph aureus MRSA and haemophilus.  Will be able to choose oral antibiotic for discharge planning        Labs, Microbiology, Radiology and pertinent results from current hospitalization and care every where were reviewed by me as a part of the consultation.    PLAN :  Change to oral Linezolid  600 mg q 12HR FOR  MRSA x 5 days  AT D/C   Cont IV  Ceftriaxone x 2 gm Q 24 
patient's care please call me with any questions or concerns.    Dr. Claudy Rg MD  Infectious Disease  [facility] Physician  Phone: [phone]   Fax : [phone]

## 2024-05-16 NOTE — TELEPHONE ENCOUNTER
Submitted PA for Linezolid  [facility]ia [facility]: [id number]  STATUS: PENDING.    Follow up done daily; if no decision with in three days we will refax.  If another three days goes by with no decision will call the insurance for status.

## 2024-05-16 NOTE — CARE COORDINATION
Case Management Discharge Note          Date / Time of Note: 5/16/2024 10:57 AM                  Patient Name: Lacey Champagne   YOB: 1970  Diagnosis: SOB (shortness of breath) [R06.02]  Generalized weakness [R53.1]  COPD exacerbation (HCC) [J44.1]  Acute on chronic respiratory failure with hypoxia and hypercapnia (HCC) [J96.21, J96.22]   Date / Time: 5/11/2024  8:15 AM    Financial:  Payor: [facility] / Plan: [facility] MEDICAID / Product Type: *No Product type* /      Pharm[facility]:    [facility] [id number] [address] - P [phone] - F [phone]  [address] [facility] [address]  Phone: [phone] Fax: [phone]    [facility] [id number] - [place], [place] - [address] - P [phone] - F [phone]  [address] [facility] [address]  Phone: [phone] Fax: [phone]      Assistance purchasing medications?: Potential Assistance Purchasing Medications: No  Assistance provided by Case Management: None at this time    DISCHARGE Disposition: Home- No Services Needed    Home Care:  Home Care ordered at discharge: No, patient declines needing home care    Home Oxygen and Respiratory Equipment:  Oxygen needed at discharge?: Yes  Home Oxygen Company: [employer] Phone: [phone]   Portable tank available for discharge?: Yes    Transportation:  Transportation PLAN for discharge: family   Mode of Transport: Private Car  Time of Transport: later today    IMM Completed:   Not Indicated    Additional CM Notes: Patient will return home. Denies home needs.     The Plan for Transition of Care is related to the following treatment goals of SOB (shortness of breath) [R06.02]  Generalized weakness [R53.1]  COPD exacerbation (HCC) [J44.1]  Acute on chronic respiratory failure with hypoxia and hypercapnia (HCC) [J96.21, J96.22]    Chuyita Arnold RN  [facility]   Case Management Department  Ph: [phone]    
   05/13/24 1217   Readmission Assessment   Number of Days since last admission? 8-30 days   Previous Disposition Home Alone   Who is being Interviewed Patient   What was the patient's/caregiver's perception as to why they think they needed to return back to the hospital? Other (Comment)  (shortness of breath)   Did you visit your Primary Care Physician after you left the hospital, before you returned this time? No   Why weren't you able to visit your PCP? Did not have an appointment   Did you see a specialist, such as Cardiac, Pulmonary, Orthopedic Physician, etc. after you left the hospital? No   Who advised the patient to return to the hospital? Self-referral   Does the patient report anything that got in the way of taking their medications? No   In our efforts to provide the best possible care to you and others like you, can you think of anything that we could have done to help you after you left the hospital the first time, so that you might not have needed to return so soon? Other (Comment)  (patient says \"no\")       
Pulmonology signed off.   ID following, respiratory cultures pending; no mention of needing IVAB at dc but will watch ID recs.     Plan is for patient to return home. No anticipated discharge needs. Patient already uses 2L O2 baseline thru [employer].    Electronically signed by Chuyita Arnold RN Case Management on 5/15/2024 at 12:31 PM    
ADLs/IADLs    PT AM-PAC: 24 /24  OT AM-PAC: 24 /24    Family can provide assistance at DC: No  Would you like Case Management to discuss the discharge plan with any other family members/significant others, and if so, who? No  Plans to Return to Present Housing: Yes  Other Identified Issues/Barriers to RETURNING to current housing: lives alone  Potential Assistance needed at discharge: N/A            Potential DME:    Patient expects to discharge to: Apartment  Plan for transportation at discharge: Family    Financial    Payor: [facility] / Plan: [facility] MEDICAID / Product Type: *No Product type* /     Does insurance require precert for SNF: Yes    Potential assistance Purchasing Medications: No  Meds-to-Beds request: Yes      [facility] [id number] [address]  - P [phone] - F [phone]  [address] [facility] [address]  Phone: [phone] Fax: [phone]    [facility] [id number] [place], [place] - [address] [facility] [address] - P [phone] - F [phone]  [address] [facility] [address]  Phone: [phone] Fax: [phone]      Notes:    Factors facilitating achievement of predicted outcomes: Family support, Cooperative, and Pleasant    Barriers to discharge: Decreased endurance    Additional Case Management Notes: Patient says she will return home. Declines needing assistance at home. Has pull behind concentrator in room from [facility], uses 2L O2 baseline.     The Plan for Transition of Care is related to the following treatment goals of SOB (shortness of breath) [R06.02]  Generalized weakness [R53.1]  COPD exacerbation (HCC) [J44.1]  Acute on chronic respiratory failure with hypoxia and hypercapnia (HCC) [J96.21, J96.22]    IF APPLICABLE: The Patient and/or patient representative Lacey and her family were provided with a choice of provider and agrees with the discharge plan. Freedom of choice list with basic dialogue that supports the patient's individualized plan

## 2024-05-16 NOTE — PLAN OF CARE
Problem: Discharge Planning  Goal: Discharge to home or other facility with appropriate resources  5/13/2024 0302 by Chuyita Cool RN  Outcome: Progressing  Flowsheets (Taken 5/12/2024 2008)  Discharge to home or other facility with appropriate resources:   Identify barriers to discharge with patient and caregiver   Arrange for needed discharge resources and transportation as appropriate   Identify discharge learning needs (meds, wound care, etc)   Refer to discharge planning if patient needs post-hospital services based on physician order or complex needs related to functional status, cognitive ability or social support system     Problem: Pain  Goal: Verbalizes/displays adequate comfort level or baseline comfort level  5/13/2024 0302 by Chuyita Cool RN  Outcome: Progressing  Flowsheets (Taken 5/11/2024 2125)  Verbalizes/displays adequate comfort level or baseline comfort level:   Encourage patient to monitor pain and request assistance   Assess pain using appropriate pain scale   Administer analgesics based on type and severity of pain and evaluate response   Implement non-pharmacological measures as appropriate and evaluate response   Consider cultural and social influences on pain and pain management   Notify Licensed Independent Practitioner if interventions unsuccessful or patient reports new pain     Problem: Safety - Adult  Goal: Free from fall injury  5/13/2024 0302 by Chuyita Cool RN  Outcome: Progressing  Flowsheets (Taken 5/13/2024 0302)  Free From Fall Injury: Instruct family/caregiver on patient safety       Problem: Skin/Tissue Integrity  Goal: Absence of new skin breakdown  Description: 1.  Monitor for areas of redness and/or skin breakdown  2.  Assess vascular access sites hourly  3.  Every 4-6 hours minimum:  Change oxygen saturation probe site  4.  Every 4-6 hours:  If on nasal continuous positive airway pressure, respiratory therapy assess nares and determine need for appliance 
  Problem: Discharge Planning  Goal: Discharge to home or other facility with appropriate resources  5/13/2024 0947 by Neida Azul, RN  Outcome: Progressing  5/13/2024 0302 by Chuyita Cool RN  Outcome: Progressing  Flowsheets (Taken 5/12/2024 2008)  Discharge to home or other facility with appropriate resources:   Identify barriers to discharge with patient and caregiver   Arrange for needed discharge resources and transportation as appropriate   Identify discharge learning needs (meds, wound care, etc)   Refer to discharge planning if patient needs post-hospital services based on physician order or complex needs related to functional status, cognitive ability or social support system  5/13/2024 0302 by Chuyita Cool RN  Outcome: Progressing  Flowsheets (Taken 5/12/2024 2008)  Discharge to home or other facility with appropriate resources:   Identify barriers to discharge with patient and caregiver   Arrange for needed discharge resources and transportation as appropriate   Identify discharge learning needs (meds, wound care, etc)   Refer to discharge planning if patient needs post-hospital services based on physician order or complex needs related to functional status, cognitive ability or social support system     Problem: Pain  Goal: Verbalizes/displays adequate comfort level or baseline comfort level  5/13/2024 0947 by Neida Azul, RN  Outcome: Progressing  5/13/2024 0302 by Chuyita Cool RN  Outcome: Progressing  Flowsheets (Taken 5/11/2024 2125)  Verbalizes/displays adequate comfort level or baseline comfort level:   Encourage patient to monitor pain and request assistance   Assess pain using appropriate pain scale   Administer analgesics based on type and severity of pain and evaluate response   Implement non-pharmacological measures as appropriate and evaluate response   Consider cultural and social influences on pain and pain management   Notify Licensed Independent Practitioner if 
  Problem: Discharge Planning  Goal: Discharge to home or other facility with appropriate resources  5/15/2024 0827 by Briana Boyce RN  Outcome: Progressing     Problem: Pain  Goal: Verbalizes/displays adequate comfort level or baseline comfort level  5/15/2024 0827 by Briana Boyce RN  Outcome: Progressing     Problem: Safety - Adult  Goal: Free from fall injury  5/15/2024 0827 by Briana Boyce RN  Outcome: Progressing     Problem: Skin/Tissue Integrity  Goal: Absence of new skin breakdown  Description: 1.  Monitor for areas of redness and/or skin breakdown  2.  Assess vascular access sites hourly  3.  Every 4-6 hours minimum:  Change oxygen saturation probe site  4.  Every 4-6 hours:  If on nasal continuous positive airway pressure, respiratory therapy assess nares and determine need for appliance change or resting period.  5/15/2024 0827 by Briana Boyce RN  Outcome: Progressing     Problem: Risk for Elopement  Goal: Patient will not exit the unit/facility without proper excort  Outcome: Progressing     Problem: Respiratory - Adult  Goal: Achieves optimal ventilation and oxygenation  5/15/2024 0827 by Briana Boyce RN  Outcome: Progressing     Problem: Skin/Tissue Integrity - Adult  Goal: Skin integrity remains intact  5/15/2024 0827 by Briana Boyce RN  Outcome: Progressing     Problem: Chronic Conditions and Co-morbidities  Goal: Patient's chronic conditions and co-morbidity symptoms are monitored and maintained or improved  5/15/2024 0827 by Briana Boyce RN  Outcome: Progressing     
  Problem: Discharge Planning  Goal: Discharge to home or other facility with appropriate resources  5/16/2024 1012 by Briana Boyce RN  Outcome: Completed  5/16/2024 0727 by Jazmín Piedra RN  Outcome: Progressing  Flowsheets (Taken 5/16/2024 0727)  Discharge to home or other facility with appropriate resources: Identify barriers to discharge with patient and caregiver     Problem: Pain  Goal: Verbalizes/displays adequate comfort level or baseline comfort level  5/16/2024 1012 by Briana Boyce RN  Outcome: Completed  5/16/2024 0727 by Jazmín Piedra RN  Outcome: Progressing  Flowsheets (Taken 5/16/2024 0727)  Verbalizes/displays adequate comfort level or baseline comfort level:   Encourage patient to monitor pain and request assistance   Assess pain using appropriate pain scale   Administer analgesics based on type and severity of pain and evaluate response   Implement non-pharmacological measures as appropriate and evaluate response     Problem: Safety - Adult  Goal: Free from fall injury  5/16/2024 1012 by Briana Boyce RN  Outcome: Completed  5/16/2024 0727 by Jazmín Piedra RN  Outcome: Progressing  Flowsheets (Taken 5/16/2024 0727)  Free From Fall Injury: Instruct family/caregiver on patient safety     Problem: Skin/Tissue Integrity  Goal: Absence of new skin breakdown  Description: 1.  Monitor for areas of redness and/or skin breakdown  2.  Assess vascular access sites hourly  3.  Every 4-6 hours minimum:  Change oxygen saturation probe site  4.  Every 4-6 hours:  If on nasal continuous positive airway pressure, respiratory therapy assess nares and determine need for appliance change or resting period.  5/16/2024 1012 by Briana Boyce RN  Outcome: Completed  5/16/2024 0727 by Jazmín Piedra RN  Outcome: Progressing     Problem: Risk for Elopement  Goal: Patient will not exit the unit/facility without proper excort  5/16/2024 1012 by Briana Boyce RN  Outcome: Completed  5/16/2024 0727 by Dorothea 
  Problem: Discharge Planning  Goal: Discharge to home or other facility with appropriate resources  Outcome: Progressing     Problem: Pain  Goal: Verbalizes/displays adequate comfort level or baseline comfort level  5/14/2024 0849 by Briana Boyce RN  Outcome: Progressing  5/14/2024 0350 by Chuyita Cool RN  Outcome: Progressing  Flowsheets (Taken 5/11/2024 2125)  Verbalizes/displays adequate comfort level or baseline comfort level:   Encourage patient to monitor pain and request assistance   Assess pain using appropriate pain scale   Administer analgesics based on type and severity of pain and evaluate response   Implement non-pharmacological measures as appropriate and evaluate response   Consider cultural and social influences on pain and pain management   Notify Licensed Independent Practitioner if interventions unsuccessful or patient reports new pain     Problem: Safety - Adult  Goal: Free from fall injury  5/14/2024 0849 by Briana Boyce RN  Outcome: Progressing  5/14/2024 0350 by Chuyita Cool RN  Outcome: Progressing  Flowsheets (Taken 5/14/2024 0350)  Free From Fall Injury: Instruct family/caregiver on patient safety     Problem: Skin/Tissue Integrity  Goal: Absence of new skin breakdown  Description: 1.  Monitor for areas of redness and/or skin breakdown  2.  Assess vascular access sites hourly  3.  Every 4-6 hours minimum:  Change oxygen saturation probe site  4.  Every 4-6 hours:  If on nasal continuous positive airway pressure, respiratory therapy assess nares and determine need for appliance change or resting period.  5/14/2024 0849 by Briana Boyce RN  Outcome: Progressing  5/14/2024 0350 by Chuyita Cool RN  Outcome: Progressing     Problem: Risk for Elopement  Goal: Patient will not exit the unit/facility without proper excort  Outcome: Progressing     Problem: Respiratory - Adult  Goal: Achieves optimal ventilation and oxygenation  5/14/2024 0849 by Briana Boyce RN  Outcome: 
  Problem: Discharge Planning  Goal: Discharge to home or other facility with appropriate resources  Outcome: Progressing     Problem: Pain  Goal: Verbalizes/displays adequate comfort level or baseline comfort level  Outcome: Progressing     Problem: Safety - Adult  Goal: Free from fall injury  Outcome: Progressing     Problem: Skin/Tissue Integrity  Goal: Absence of new skin breakdown  Description: 1.  Monitor for areas of redness and/or skin breakdown  2.  Assess vascular access sites hourly  3.  Every 4-6 hours minimum:  Change oxygen saturation probe site  4.  Every 4-6 hours:  If on nasal continuous positive airway pressure, respiratory therapy assess nares and determine need for appliance change or resting period.  Outcome: Progressing     Problem: Respiratory - Adult  Goal: Achieves optimal ventilation and oxygenation  Outcome: Progressing     Problem: Skin/Tissue Integrity - Adult  Goal: Skin integrity remains intact  Outcome: Progressing     Problem: Chronic Conditions and Co-morbidities  Goal: Patient's chronic conditions and co-morbidity symptoms are monitored and maintained or improved  Outcome: Progressing     
  Problem: Discharge Planning  Goal: Discharge to home or other facility with appropriate resources  Outcome: Progressing     Problem: Pain  Goal: Verbalizes/displays adequate comfort level or baseline comfort level  Outcome: Progressing     Problem: Safety - Adult  Goal: Free from fall injury  Outcome: Progressing     Problem: Skin/Tissue Integrity  Goal: Absence of new skin breakdown  Description: 1.  Monitor for areas of redness and/or skin breakdown  2.  Assess vascular access sites hourly  3.  Every 4-6 hours minimum:  Change oxygen saturation probe site  4.  Every 4-6 hours:  If on nasal continuous positive airway pressure, respiratory therapy assess nares and determine need for appliance change or resting period.  Outcome: Progressing     Problem: Risk for Elopement  Goal: Patient will not exit the unit/facility without proper excort  Outcome: Progressing     
  Problem: Discharge Planning  Goal: Discharge to home or other facility with appropriate resources  Outcome: Progressing     Problem: Pain  Goal: Verbalizes/displays adequate comfort level or baseline comfort level  Outcome: Progressing     Problem: Safety - Adult  Goal: Free from fall injury  Outcome: Progressing     Problem: Skin/Tissue Integrity  Goal: Absence of new skin breakdown  Description: 1.  Monitor for areas of redness and/or skin breakdown  2.  Assess vascular access sites hourly  3.  Every 4-6 hours minimum:  Change oxygen saturation probe site  4.  Every 4-6 hours:  If on nasal continuous positive airway pressure, respiratory therapy assess nares and determine need for appliance change or resting period.  Outcome: Progressing     Problem: Risk for Elopement  Goal: Patient will not exit the unit/facility without proper excort  Outcome: Progressing     
maintained or improved  Outcome: Progressing  Flowsheets (Taken 5/12/2024 2008)  Care Plan - Patient's Chronic Conditions and Co-Morbidity Symptoms are Monitored and Maintained or Improved:   Monitor and assess patient's chronic conditions and comorbid symptoms for stability, deterioration, or improvement   Collaborate with multidisciplinary team to address chronic and comorbid conditions and prevent exacerbation or deterioration   Update acute care plan with appropriate goals if chronic or comorbid symptoms are exacerbated and prevent overall improvement and discharge     
oxygenation and minimize respiratory effort   Assess for changes in mentation and behavior

## 2024-05-17 NOTE — TELEPHONE ENCOUNTER
Approved on May 16  Your PA request for [id number] was approved for 28 days. The PA# assigned is [id number].

## 2024-07-22 ENCOUNTER — HOSPITAL ENCOUNTER (INPATIENT)
Age: 54
LOS: 2 days | Discharge: HOME | End: 2024-07-24
Attending: STUDENT IN AN ORGANIZED HEALTH CARE EDUCATION/TRAINING PROGRAM | Admitting: HOSPITALIST
Payer: COMMERCIAL

## 2024-07-22 ENCOUNTER — APPOINTMENT (OUTPATIENT)
Dept: GENERAL RADIOLOGY | Age: 54
End: 2024-07-22
Payer: COMMERCIAL

## 2024-07-22 DIAGNOSIS — J44.1 COPD EXACERBATION (HCC): ICD-10-CM

## 2024-07-22 DIAGNOSIS — J96.12 CHRONIC RESPIRATORY FAILURE WITH HYPERCAPNIA (HCC): ICD-10-CM

## 2024-07-22 DIAGNOSIS — J96.01 ACUTE HYPOXIC ON CHRONIC HYPERCAPNIC RESPIRATORY FAILURE (HCC): Primary | ICD-10-CM

## 2024-07-22 DIAGNOSIS — R05.3 CHRONIC COUGH: ICD-10-CM

## 2024-07-22 DIAGNOSIS — L08.9 TYPE 2 DIABETES MELLITUS WITH RIGHT DIABETIC FOOT INFECTION (HCC): ICD-10-CM

## 2024-07-22 DIAGNOSIS — J96.12 ACUTE HYPOXIC ON CHRONIC HYPERCAPNIC RESPIRATORY FAILURE (HCC): Primary | ICD-10-CM

## 2024-07-22 DIAGNOSIS — E11.628 TYPE 2 DIABETES MELLITUS WITH RIGHT DIABETIC FOOT INFECTION (HCC): ICD-10-CM

## 2024-07-22 LAB
ALBUMIN SERPL-MCNC: 3.9 G/DL (ref 3.4–5)
ALBUMIN/GLOB SERPL: 1.3 {RATIO} (ref 1.1–2.2)
ALP SERPL-CCNC: 94 U/L (ref 40–129)
ALT SERPL-CCNC: 17 U/L (ref 10–40)
ANION GAP SERPL CALCULATED.3IONS-SCNC: 5 MMOL/L (ref 3–16)
AST SERPL-CCNC: 16 U/L (ref 15–37)
BACTERIA URNS QL MICRO: ABNORMAL /HPF
BASE EXCESS BLDV CALC-SCNC: 18 MMOL/L (ref -3–3)
BASOPHILS # BLD: 0 K/UL (ref 0–0.2)
BASOPHILS NFR BLD: 0.2 %
BILIRUB SERPL-MCNC: 0.6 MG/DL (ref 0–1)
BILIRUB UR QL STRIP.AUTO: NEGATIVE
BUN SERPL-MCNC: 17 MG/DL (ref 7–20)
CALCIUM SERPL-MCNC: 9.1 MG/DL (ref 8.3–10.6)
CHLORIDE SERPL-SCNC: 93 MMOL/L (ref 99–110)
CLARITY UR: ABNORMAL
CO2 BLDV-SCNC: 47 MMOL/L
CO2 SERPL-SCNC: 44 MMOL/L (ref 21–32)
COLOR UR: YELLOW
CREAT SERPL-MCNC: 0.6 MG/DL (ref 0.6–1.1)
D-DIMER QUANTITATIVE: <0.27 UG/ML FEU (ref 0–0.6)
DEPRECATED RDW RBC AUTO: 16.9 % (ref 12.4–15.4)
EKG ATRIAL RATE: 82 BPM
EKG DIAGNOSIS: NORMAL
EKG P AXIS: 55 DEGREES
EKG P-R INTERVAL: 174 MS
EKG Q-T INTERVAL: 378 MS
EKG QRS DURATION: 94 MS
EKG QTC CALCULATION (BAZETT): 441 MS
EKG R AXIS: -47 DEGREES
EKG T AXIS: 11 DEGREES
EKG VENTRICULAR RATE: 82 BPM
EOSINOPHIL # BLD: 0.3 K/UL (ref 0–0.6)
EOSINOPHIL NFR BLD: 6.8 %
EPI CELLS #/AREA URNS HPF: ABNORMAL /HPF (ref 0–5)
EST. AVERAGE GLUCOSE BLD GHB EST-MCNC: 168.6 MG/DL
FLUAV RNA UPPER RESP QL NAA+PROBE: NEGATIVE
FLUBV AG NPH QL: NEGATIVE
GFR SERPLBLD CREATININE-BSD FMLA CKD-EPI: >90 ML/MIN/{1.73_M2}
GLUCOSE BLD-MCNC: 256 MG/DL (ref 70–99)
GLUCOSE BLD-MCNC: 290 MG/DL (ref 70–99)
GLUCOSE BLD-MCNC: 303 MG/DL (ref 70–99)
GLUCOSE BLD-MCNC: 315 MG/DL (ref 70–99)
GLUCOSE BLD-MCNC: 335 MG/DL (ref 70–99)
GLUCOSE SERPL-MCNC: 341 MG/DL (ref 70–99)
GLUCOSE UR STRIP.AUTO-MCNC: >=1000 MG/DL
HBA1C MFR BLD: 7.5 %
HCO3 BLDV-SCNC: 44.9 MMOL/L (ref 23–29)
HCT VFR BLD AUTO: 54.2 % (ref 36–48)
HGB BLD-MCNC: 16.7 G/DL (ref 12–16)
HGB UR QL STRIP.AUTO: ABNORMAL
KETONES UR STRIP.AUTO-MCNC: NEGATIVE MG/DL
LEUKOCYTE ESTERASE UR QL STRIP.AUTO: NEGATIVE
LYMPHOCYTES # BLD: 0.6 K/UL (ref 1–5.1)
LYMPHOCYTES NFR BLD: 12.3 %
MCH RBC QN AUTO: 28.3 PG (ref 26–34)
MCHC RBC AUTO-ENTMCNC: 30.8 G/DL (ref 31–36)
MCV RBC AUTO: 91.9 FL (ref 80–100)
MONOCYTES # BLD: 0.3 K/UL (ref 0–1.3)
MONOCYTES NFR BLD: 5.1 %
NEUTROPHILS # BLD: 3.8 K/UL (ref 1.7–7.7)
NEUTROPHILS NFR BLD: 75.6 %
NITRITE UR QL STRIP.AUTO: NEGATIVE
NT-PROBNP SERPL-MCNC: 497 PG/ML (ref 0–124)
O2 THERAPY: ABNORMAL
PCO2 BLDV: 97.8 MMHG (ref 40–50)
PERFORMED ON: ABNORMAL
PH BLDV: 7.27 [PH] (ref 7.35–7.45)
PH UR STRIP.AUTO: 7 [PH] (ref 5–8)
PLATELET # BLD AUTO: 133 K/UL (ref 135–450)
PMV BLD AUTO: 7.4 FL (ref 5–10.5)
PO2 BLDV: 43.1 MMHG (ref 25–40)
POTASSIUM SERPL-SCNC: 4.5 MMOL/L (ref 3.5–5.1)
PROT SERPL-MCNC: 6.9 G/DL (ref 6.4–8.2)
PROT UR STRIP.AUTO-MCNC: 100 MG/DL
RBC # BLD AUTO: 5.9 M/UL (ref 4–5.2)
RBC #/AREA URNS HPF: ABNORMAL /HPF (ref 0–4)
SAO2 % BLDV: 68 %
SARS-COV-2 RDRP RESP QL NAA+PROBE: NOT DETECTED
SODIUM SERPL-SCNC: 142 MMOL/L (ref 136–145)
SP GR UR STRIP.AUTO: 1.02 (ref 1–1.03)
UA COMPLETE W REFLEX CULTURE PNL UR: YES
UA DIPSTICK W REFLEX MICRO PNL UR: YES
URN SPEC COLLECT METH UR: ABNORMAL
UROBILINOGEN UR STRIP-ACNC: 0.2 E.U./DL
WBC # BLD AUTO: 5 K/UL (ref 4–11)
WBC #/AREA URNS HPF: ABNORMAL /HPF (ref 0–5)

## 2024-07-22 PROCEDURE — 83036 HEMOGLOBIN GLYCOSYLATED A1C: CPT

## 2024-07-22 PROCEDURE — 6370000000 HC RX 637 (ALT 250 FOR IP): Performed by: HOSPITALIST

## 2024-07-22 PROCEDURE — 94660 CPAP INITIATION&MGMT: CPT

## 2024-07-22 PROCEDURE — 85025 COMPLETE CBC W/AUTO DIFF WBC: CPT

## 2024-07-22 PROCEDURE — 1200000000 HC SEMI PRIVATE

## 2024-07-22 PROCEDURE — 85379 FIBRIN DEGRADATION QUANT: CPT

## 2024-07-22 PROCEDURE — 82803 BLOOD GASES ANY COMBINATION: CPT

## 2024-07-22 PROCEDURE — 36415 COLL VENOUS BLD VENIPUNCTURE: CPT

## 2024-07-22 PROCEDURE — 6360000002 HC RX W HCPCS: Performed by: HOSPITALIST

## 2024-07-22 PROCEDURE — 5A09357 ASSISTANCE WITH RESPIRATORY VENTILATION, LESS THAN 24 CONSECUTIVE HOURS, CONTINUOUS POSITIVE AIRWAY PRESSURE: ICD-10-PCS | Performed by: STUDENT IN AN ORGANIZED HEALTH CARE EDUCATION/TRAINING PROGRAM

## 2024-07-22 PROCEDURE — 94640 AIRWAY INHALATION TREATMENT: CPT

## 2024-07-22 PROCEDURE — 2580000003 HC RX 258: Performed by: HOSPITALIST

## 2024-07-22 PROCEDURE — 93005 ELECTROCARDIOGRAM TRACING: CPT | Performed by: STUDENT IN AN ORGANIZED HEALTH CARE EDUCATION/TRAINING PROGRAM

## 2024-07-22 PROCEDURE — 87186 SC STD MICRODIL/AGAR DIL: CPT

## 2024-07-22 PROCEDURE — 99285 EMERGENCY DEPT VISIT HI MDM: CPT

## 2024-07-22 PROCEDURE — 99223 1ST HOSP IP/OBS HIGH 75: CPT | Performed by: INTERNAL MEDICINE

## 2024-07-22 PROCEDURE — 87088 URINE BACTERIA CULTURE: CPT

## 2024-07-22 PROCEDURE — 87804 INFLUENZA ASSAY W/OPTIC: CPT

## 2024-07-22 PROCEDURE — 93010 ELECTROCARDIOGRAM REPORT: CPT | Performed by: INTERNAL MEDICINE

## 2024-07-22 PROCEDURE — 80053 COMPREHEN METABOLIC PANEL: CPT

## 2024-07-22 PROCEDURE — 2700000000 HC OXYGEN THERAPY PER DAY

## 2024-07-22 PROCEDURE — 81001 URINALYSIS AUTO W/SCOPE: CPT

## 2024-07-22 PROCEDURE — 6370000000 HC RX 637 (ALT 250 FOR IP): Performed by: STUDENT IN AN ORGANIZED HEALTH CARE EDUCATION/TRAINING PROGRAM

## 2024-07-22 PROCEDURE — 71045 X-RAY EXAM CHEST 1 VIEW: CPT

## 2024-07-22 PROCEDURE — 87635 SARS-COV-2 COVID-19 AMP PRB: CPT

## 2024-07-22 PROCEDURE — 94761 N-INVAS EAR/PLS OXIMETRY MLT: CPT

## 2024-07-22 PROCEDURE — 87086 URINE CULTURE/COLONY COUNT: CPT

## 2024-07-22 PROCEDURE — 83880 ASSAY OF NATRIURETIC PEPTIDE: CPT

## 2024-07-22 RX ORDER — CARVEDILOL 6.25 MG/1
6.25 TABLET ORAL 2 TIMES DAILY WITH MEALS
Status: DISCONTINUED | OUTPATIENT
Start: 2024-07-22 | End: 2024-07-24 | Stop reason: HOSPADM

## 2024-07-22 RX ORDER — INSULIN LISPRO 100 [IU]/ML
0-8 INJECTION, SOLUTION INTRAVENOUS; SUBCUTANEOUS
Status: DISCONTINUED | OUTPATIENT
Start: 2024-07-22 | End: 2024-07-22 | Stop reason: ALTCHOICE

## 2024-07-22 RX ORDER — INSULIN LISPRO 100 [IU]/ML
0-4 INJECTION, SOLUTION INTRAVENOUS; SUBCUTANEOUS NIGHTLY
Status: DISCONTINUED | OUTPATIENT
Start: 2024-07-22 | End: 2024-07-22 | Stop reason: ALTCHOICE

## 2024-07-22 RX ORDER — LEVOTHYROXINE SODIUM 0.05 MG/1
50 TABLET ORAL
Status: DISCONTINUED | OUTPATIENT
Start: 2024-07-23 | End: 2024-07-24 | Stop reason: HOSPADM

## 2024-07-22 RX ORDER — INSULIN GLARGINE 100 [IU]/ML
20 INJECTION, SOLUTION SUBCUTANEOUS EVERY MORNING
Status: DISCONTINUED | OUTPATIENT
Start: 2024-07-23 | End: 2024-07-24 | Stop reason: HOSPADM

## 2024-07-22 RX ORDER — IPRATROPIUM BROMIDE AND ALBUTEROL SULFATE 2.5; .5 MG/3ML; MG/3ML
2 SOLUTION RESPIRATORY (INHALATION) ONCE
Status: COMPLETED | OUTPATIENT
Start: 2024-07-22 | End: 2024-07-22

## 2024-07-22 RX ORDER — DEXTROSE MONOHYDRATE 100 MG/ML
INJECTION, SOLUTION INTRAVENOUS CONTINUOUS PRN
Status: DISCONTINUED | OUTPATIENT
Start: 2024-07-22 | End: 2024-07-24 | Stop reason: HOSPADM

## 2024-07-22 RX ORDER — ENOXAPARIN SODIUM 100 MG/ML
40 INJECTION SUBCUTANEOUS NIGHTLY
Status: DISCONTINUED | OUTPATIENT
Start: 2024-07-22 | End: 2024-07-24 | Stop reason: HOSPADM

## 2024-07-22 RX ORDER — SODIUM CHLORIDE 0.9 % (FLUSH) 0.9 %
5-40 SYRINGE (ML) INJECTION EVERY 12 HOURS SCHEDULED
Status: DISCONTINUED | OUTPATIENT
Start: 2024-07-22 | End: 2024-07-24 | Stop reason: HOSPADM

## 2024-07-22 RX ORDER — ONDANSETRON 2 MG/ML
4 INJECTION INTRAMUSCULAR; INTRAVENOUS EVERY 6 HOURS PRN
Status: DISCONTINUED | OUTPATIENT
Start: 2024-07-22 | End: 2024-07-24 | Stop reason: HOSPADM

## 2024-07-22 RX ORDER — CETIRIZINE HYDROCHLORIDE 10 MG/1
10 TABLET ORAL DAILY
Status: DISCONTINUED | OUTPATIENT
Start: 2024-07-22 | End: 2024-07-24 | Stop reason: HOSPADM

## 2024-07-22 RX ORDER — DEXTROSE MONOHYDRATE 100 MG/ML
INJECTION, SOLUTION INTRAVENOUS CONTINUOUS PRN
Status: DISCONTINUED | OUTPATIENT
Start: 2024-07-22 | End: 2024-07-22 | Stop reason: ALTCHOICE

## 2024-07-22 RX ORDER — HYDROCHLOROTHIAZIDE 25 MG/1
12.5 TABLET ORAL DAILY
Status: DISCONTINUED | OUTPATIENT
Start: 2024-07-22 | End: 2024-07-24 | Stop reason: HOSPADM

## 2024-07-22 RX ORDER — KETOROLAC TROMETHAMINE 30 MG/ML
30 INJECTION, SOLUTION INTRAMUSCULAR; INTRAVENOUS EVERY 6 HOURS
Status: DISCONTINUED | OUTPATIENT
Start: 2024-07-22 | End: 2024-07-24 | Stop reason: HOSPADM

## 2024-07-22 RX ORDER — SODIUM CHLORIDE 0.9 % (FLUSH) 0.9 %
5-40 SYRINGE (ML) INJECTION PRN
Status: DISCONTINUED | OUTPATIENT
Start: 2024-07-22 | End: 2024-07-24 | Stop reason: HOSPADM

## 2024-07-22 RX ORDER — POLYETHYLENE GLYCOL 3350 17 G/17G
17 POWDER, FOR SOLUTION ORAL DAILY PRN
Status: DISCONTINUED | OUTPATIENT
Start: 2024-07-22 | End: 2024-07-24 | Stop reason: HOSPADM

## 2024-07-22 RX ORDER — PREDNISONE 20 MG/1
40 TABLET ORAL DAILY
Status: DISCONTINUED | OUTPATIENT
Start: 2024-07-23 | End: 2024-07-24 | Stop reason: HOSPADM

## 2024-07-22 RX ORDER — AZITHROMYCIN 500 MG/1
500 TABLET, FILM COATED ORAL DAILY
Status: COMPLETED | OUTPATIENT
Start: 2024-07-22 | End: 2024-07-24

## 2024-07-22 RX ORDER — LOSARTAN POTASSIUM 50 MG/1
50 TABLET ORAL DAILY
Status: DISCONTINUED | OUTPATIENT
Start: 2024-07-22 | End: 2024-07-24 | Stop reason: HOSPADM

## 2024-07-22 RX ORDER — PREDNISONE 20 MG/1
40 TABLET ORAL ONCE
Status: COMPLETED | OUTPATIENT
Start: 2024-07-22 | End: 2024-07-22

## 2024-07-22 RX ORDER — ACETAMINOPHEN 325 MG/1
650 TABLET ORAL EVERY 6 HOURS PRN
Status: DISCONTINUED | OUTPATIENT
Start: 2024-07-22 | End: 2024-07-24 | Stop reason: HOSPADM

## 2024-07-22 RX ORDER — IPRATROPIUM BROMIDE AND ALBUTEROL SULFATE 2.5; .5 MG/3ML; MG/3ML
1 SOLUTION RESPIRATORY (INHALATION)
Status: DISCONTINUED | OUTPATIENT
Start: 2024-07-22 | End: 2024-07-24 | Stop reason: HOSPADM

## 2024-07-22 RX ORDER — ACETAMINOPHEN 650 MG/1
650 SUPPOSITORY RECTAL EVERY 6 HOURS PRN
Status: DISCONTINUED | OUTPATIENT
Start: 2024-07-22 | End: 2024-07-24 | Stop reason: HOSPADM

## 2024-07-22 RX ORDER — INSULIN LISPRO 100 [IU]/ML
0-4 INJECTION, SOLUTION INTRAVENOUS; SUBCUTANEOUS NIGHTLY
Status: DISCONTINUED | OUTPATIENT
Start: 2024-07-22 | End: 2024-07-24

## 2024-07-22 RX ORDER — ONDANSETRON 4 MG/1
4 TABLET, ORALLY DISINTEGRATING ORAL EVERY 8 HOURS PRN
Status: DISCONTINUED | OUTPATIENT
Start: 2024-07-22 | End: 2024-07-24 | Stop reason: HOSPADM

## 2024-07-22 RX ORDER — INSULIN LISPRO 100 [IU]/ML
0-4 INJECTION, SOLUTION INTRAVENOUS; SUBCUTANEOUS
Status: DISCONTINUED | OUTPATIENT
Start: 2024-07-22 | End: 2024-07-24

## 2024-07-22 RX ORDER — SODIUM CHLORIDE 9 MG/ML
INJECTION, SOLUTION INTRAVENOUS PRN
Status: DISCONTINUED | OUTPATIENT
Start: 2024-07-22 | End: 2024-07-24 | Stop reason: HOSPADM

## 2024-07-22 RX ORDER — ALBUTEROL SULFATE 2.5 MG/3ML
2.5 SOLUTION RESPIRATORY (INHALATION)
Status: DISCONTINUED | OUTPATIENT
Start: 2024-07-22 | End: 2024-07-24 | Stop reason: HOSPADM

## 2024-07-22 RX ORDER — LOSARTAN POTASSIUM AND HYDROCHLOROTHIAZIDE 12.5; 5 MG/1; MG/1
1 TABLET ORAL DAILY
Status: DISCONTINUED | OUTPATIENT
Start: 2024-07-22 | End: 2024-07-22 | Stop reason: CLARIF

## 2024-07-22 RX ORDER — BENZONATATE 100 MG/1
100 CAPSULE ORAL 3 TIMES DAILY PRN
Status: DISCONTINUED | OUTPATIENT
Start: 2024-07-22 | End: 2024-07-24 | Stop reason: HOSPADM

## 2024-07-22 RX ORDER — GLUCAGON 1 MG/ML
1 KIT INJECTION PRN
Status: DISCONTINUED | OUTPATIENT
Start: 2024-07-22 | End: 2024-07-22 | Stop reason: ALTCHOICE

## 2024-07-22 RX ADMIN — KETOROLAC TROMETHAMINE 30 MG: 30 INJECTION, SOLUTION INTRAMUSCULAR at 16:52

## 2024-07-22 RX ADMIN — CARVEDILOL 6.25 MG: 6.25 TABLET, FILM COATED ORAL at 16:52

## 2024-07-22 RX ADMIN — IPRATROPIUM BROMIDE AND ALBUTEROL SULFATE 1 DOSE: 2.5; .5 SOLUTION RESPIRATORY (INHALATION) at 19:47

## 2024-07-22 RX ADMIN — IPRATROPIUM BROMIDE AND ALBUTEROL SULFATE 1 DOSE: 2.5; .5 SOLUTION RESPIRATORY (INHALATION) at 15:48

## 2024-07-22 RX ADMIN — INSULIN LISPRO 6 UNITS: 100 INJECTION, SOLUTION INTRAVENOUS; SUBCUTANEOUS at 12:11

## 2024-07-22 RX ADMIN — ENOXAPARIN SODIUM 40 MG: 100 INJECTION SUBCUTANEOUS at 20:46

## 2024-07-22 RX ADMIN — INSULIN LISPRO 2 UNITS: 100 INJECTION, SOLUTION INTRAVENOUS; SUBCUTANEOUS at 16:52

## 2024-07-22 RX ADMIN — AZITHROMYCIN 500 MG: 500 TABLET, FILM COATED ORAL at 10:09

## 2024-07-22 RX ADMIN — IPRATROPIUM BROMIDE AND ALBUTEROL SULFATE 1 DOSE: 2.5; .5 SOLUTION RESPIRATORY (INHALATION) at 11:06

## 2024-07-22 RX ADMIN — SODIUM CHLORIDE, PRESERVATIVE FREE 10 ML: 5 INJECTION INTRAVENOUS at 10:12

## 2024-07-22 RX ADMIN — CETIRIZINE HYDROCHLORIDE 10 MG: 10 TABLET, FILM COATED ORAL at 12:11

## 2024-07-22 RX ADMIN — IPRATROPIUM BROMIDE AND ALBUTEROL SULFATE 2 DOSE: 2.5; .5 SOLUTION RESPIRATORY (INHALATION) at 04:10

## 2024-07-22 RX ADMIN — INSULIN LISPRO 4 UNITS: 100 INJECTION, SOLUTION INTRAVENOUS; SUBCUTANEOUS at 10:12

## 2024-07-22 RX ADMIN — PREDNISONE 40 MG: 20 TABLET ORAL at 04:10

## 2024-07-22 RX ADMIN — INSULIN LISPRO 4 UNITS: 100 INJECTION, SOLUTION INTRAVENOUS; SUBCUTANEOUS at 20:46

## 2024-07-22 RX ADMIN — SODIUM CHLORIDE, PRESERVATIVE FREE 10 ML: 5 INJECTION INTRAVENOUS at 20:52

## 2024-07-22 RX ADMIN — LOSARTAN POTASSIUM 50 MG: 50 TABLET, FILM COATED ORAL at 17:00

## 2024-07-22 RX ADMIN — HYDROCHLOROTHIAZIDE 12.5 MG: 25 TABLET ORAL at 17:00

## 2024-07-22 ASSESSMENT — PAIN DESCRIPTION - LOCATION: LOCATION: KNEE

## 2024-07-22 ASSESSMENT — PAIN SCALES - GENERAL
PAINLEVEL_OUTOF10: 0

## 2024-07-22 ASSESSMENT — PAIN DESCRIPTION - DESCRIPTORS: DESCRIPTORS: THROBBING;SORE;ACHING

## 2024-07-22 ASSESSMENT — PAIN - FUNCTIONAL ASSESSMENT: PAIN_FUNCTIONAL_ASSESSMENT: NONE - DENIES PAIN

## 2024-07-22 ASSESSMENT — PAIN DESCRIPTION - ORIENTATION: ORIENTATION: RIGHT;LEFT

## 2024-07-22 NOTE — CARE COORDINATION
Case Management Assessment  Initial Evaluation    Date/Time of Evaluation: 7/22/2024 2:28 PM  Assessment Completed by: Chuyita Arnold RN    If patient is discharged prior to next notation, then this note serves as note for discharge by case management.    Patient Name: Lacey Vasquez                   YOB: 1970  Diagnosis: Chronic cough [R05.3]  COPD exacerbation (HCC) [J44.1]  Acute hypoxic on chronic hypercapnic respiratory failure (HCC) [J96.01, J96.12]                   Date / Time: 7/22/2024  3:53 AM    Patient Admission Status: Inpatient   Readmission Risk (Low < 19, Mod (19-27), High > 27): Readmission Risk Score: 20.3    Current PCP: [facility]  PCP verified by ? Yes (goes to  Primary Care)    Chart Reviewed: Yes      History Provided by: Patient  Patient Orientation: Alert and Oriented    Patient Cognition: Alert    Hospitalization in the last 30 days (Readmission):  No    If yes, Readmission Assessment in  Navigator will be completed.    Advance Directives:      Code Status: Full Code   Patient's Primary Decision Maker is: Legal Next of Kin    Primary Decision Maker: David Rivera - Child - [phone]    Primary Decision Maker: Courtney Vasquez - Child - [phone]    Primary Decision Maker: lacey vasquez - Child - [phone]    Discharge Planning:    Patient lives with: Alone Type of Home: Apartment  Primary Care Giver: Self  Patient Support Systems include: Children, Family Members   Current Financial resources: Medicaid  Current community resources: None  Current services prior to admission: Durable Medical Equipment, Oxygen Therapy            Current DME: Glucometer, Shower Chair, Oxygen Therapy (Comment) (uses 2L O2 thru [employer] - has pull behind concentrator at bedside)            Type of Home Care services:  None    ADLS  Prior functional level: Independent in ADLs/IADLs  Current functional level: Independent in ADLs/IADLs    PT AM-PAC:   /24  OT

## 2024-07-22 NOTE — PROGRESS NOTES
Patient up to chair. A&OX4, VSS. Pt denies pain at this time. AM medications administered as ordered (see eMAR). IV flushed and capped. HTT Assessment complete. Pt denies any further needs at this time. Fall precautions in place.

## 2024-07-22 NOTE — PROGRESS NOTES
4 Eyes Skin Assessment     NAME:  Lacey Champagne  YOB: 1970  MEDICAL RECORD NUMBER:  [id number]    The patient is being assessed for  Admission    I agree that at least one RN has performed a thorough Head to Toe Skin Assessment on the patient. ALL assessment sites listed below have been assessed.      Areas assessed by both nurses:    Head, Face, Ears, Shoulders, Back, Chest, Arms, Elbows, Hands, Sacrum. Buttock, Coccyx, Ischium, Legs. Feet and Heels, and Under Medical Devices         Does the Patient have a Wound? No noted wound(s)     -Old pressure injuries to Right buttock,scarred over. Missing Left 5th metatarsal, vascular discoloration to bilateral shins.       Jose Prevention initiated by RN: No  Wound Care Orders initiated by RN: No    Pressure Injury (Stage 3,4, Unstageable, DTI, NWPT, and Complex wounds) if present, place Wound referral order by RN under : No    New Ostomies, if present place, Ostomy referral order under : No     Nurse 1 eSignature: Electronically signed by Zonia Cody RN on 7/22/24 at 6:31 PM EDT    **SHARE this note so that the co-signing nurse can place an eSignature**    Nurse 2 eSignature: Electronically signed by Qiana Sinha RN on 7/22/24 at 6:33 PM EDT

## 2024-07-22 NOTE — CARE COORDINATION
Prema bernardo/ [facility] says Pulmonologist's note says patient needs an NIV. DME order placed is for BIPAP. [facility] would like Pulm to complete a paper order (placed on hard chart) for completion to obtain NIV. Says overnight pulse ox is not needed, order discontinued.   Messaged Dr Ojeda to complete NIV paper order in chart.    Electronically signed by Chuyita Arnold RN Case Management  on 7/22/2024 at 4:52 PM

## 2024-07-22 NOTE — PROGRESS NOTES
Patient reports bilateral chronic knee pain, reports tylenol ineffective, requesting RN to page MD for alternative pain medication.    RN Paged MD at this time.     Electronically signed by Zonia Cody RN on 7/22/2024 at 12:36 PM

## 2024-07-22 NOTE — H&P
History and Physical  Angelina Kat MD, FACP      Name:  Lacey Champagne /Age/Sex: 1970  (54 y.o. female)   MRN & CSN:  [id number] & [id number] Admission Date/Time: 2024  3:53 AM   Location:  G5Z-1225/3104-01 PCP: Hortensia, [place] [facility] Day: 1    Assessment and Plan:     Lacey Champagne is a 54 y.o.  female  who presents with shortness of breath     # Accidental overdose of Coreg: No clinically significant or hemodynamic compromise vital stable EKG unremarkable without ST-T wave changes lab work unremarkable vital stable May resume normal dose of Eliquis while in-house.     # Acute on chronic hypercarbia: Given advanced COPD, continue to follow with pulmonology, supplemental oxygen    # Acute on chronic hypoxic respiratory failure SpO2 of 89% on room air requiring supplemental oxygen keep SpO2 between 89 to 92%, pulmonology follow-up    # COPD: Continue azithromycin, continue oral prednisone, DuoNeb, breathing treatment, pulmonology follow-up    Other chronic medical conditions ,, resume home medication as appropriate    Body mass index is 38.09 kg/m².    Diet ADULT DIET; Regular; 3 carb choices (45 gm/meal); Low Fat/Low Chol/High Fiber/2 gm Na   DVT Prophylaxis [x] Lovenox, []  Heparin, [] SCDs, [] Ambulation   GI Prophylaxis [] PPI,  [] H2 Blocker,  [x] Carafate,  [] Diet/Tube Feeds   Code Status Patient's code status was discussed and is Full Code   Disposition Home ECF in 2-3 days   MDM [] Low, [x] Moderate,[]  High  Patient's risk as above due to above     Patient is in agreement with the plan as stated above.  The patient's medication has been reviewed and verified with the patient and/or family/pharmacy.    Records from \"Care everywhere\" has been reviewed including progress notes, office  note, labs and investigation prior and summarized in the body of HPI    History of Present Illness:     Chief Complaint:  shortness of breath     History obtained from patient, medical  (A) Negative mg/dL    Bilirubin, Urine Negative Negative    Ketones, Urine Negative Negative mg/dL    Specific Gravity, UA 1.025 1.005 - 1.030    Blood, Urine TRACE-INTACT (A) Negative    pH, Urine 7.0 5.0 - 8.0    Protein,  (A) Negative mg/dL    Urobilinogen, Urine 0.2 <2.0 E.U./dL    Nitrite, Urine Negative Negative    Leukocyte Esterase, Urine Negative Negative    Microscopic Examination YES     Urine Type NotGiven     Urine Reflex to Culture Yes    Microscopic Urinalysis    Collection Time: 07/22/24  5:50 AM   Result Value Ref Range    WBC, UA 10-20 (A) 0 - 5 /HPF    RBC, UA 3-4 0 - 4 /HPF    Epithelial Cells, UA 6-10 (A) 0 - 5 /HPF    Bacteria, UA 4+ (A) None Seen /HPF   POCT Glucose    Collection Time: 07/22/24  8:57 AM   Result Value Ref Range    POC Glucose 256 (H) 70 - 99 mg/dl    Performed on ACCU-CHEK        Vitals:   Vitals:    07/22/24 0855   BP:    Pulse:    Resp:    Temp:    SpO2: 91%       Physical Exam:     Gen: NAD.  Eye: SLIM, No Icterus  Nose: Midline, No Rhinorrhea  Oropharynx: MoistMucus membrane.  Ears: B/L Symmetrical, Normal hearing.  Neck: Supple, No JVD  CVS: S1-S2, RRR.  Resp: Bilateral decreased breath sound, no wheezing  Abdo: BS+, S, NT/ND, No Guarding, No rigidity, no rebound  Neuro:  nonfocal sensory/motor exam. Normal Speech, CN II-XII grossly normal  Psych: AOX3.,  Anxious appearing good Insight and judgement  Extrem: No trace 1+ Edema, No Cyanosis, ROM normal     Past Medical History:      PMH:   Past Medical History:   Diagnosis Date    Anemia     Asthma     Cerebral artery occlusion with cerebral infarction (HCC)     CHF (congestive heart failure) (HCC)     Chronic venous hypertension (idiopathic) with ulcer of bilateral lower extremity (HCC) 4/24/2023    Has wounds bilateral lower legs    COPD (chronic obstructive pulmonary disease) (HCC)     Diabetes (HCC)     Edema     Edema     Foot ulcer (HCC)     HTN (hypertension), benign 11/19/2013    Hyperlipidemia     PAD

## 2024-07-22 NOTE — ACP (ADVANCE CARE PLANNING)
Advance Care Planning     Advance Care Planning Activator (Inpatient)  Conversation Note      Date of ACP Conversation: 7/22/2024     Conversation Conducted with: Patient with Decision Making Capacity    ACP Activator: Chuyita Arnold RN    Health Care Decision Maker:     Current Designated Health Care Decision Maker:     Primary Decision Maker: David Rivera - Child - [phone]    Primary Decision Maker: Courtney Vasquez - Child - [phone]    Primary Decision Maker: vasquezzenaida - Child - [phone]    Care Preferences    Ventilation:  \"If you were in your present state of health and suddenly became very ill and were unable to breathe on your own, what would your preference be about the use of a ventilator (breathing machine) if it were available to you?\"      Would the patient desire the use of ventilator (breathing machine)?: yes    \"If your health worsens and it becomes clear that your chance of recovery is unlikely, what would your preference be about the use of a ventilator (breathing machine) if it were available to you?\"     Would the patient desire the use of ventilator (breathing machine)?: Up to my children      Resuscitation  \"CPR works best to restart the heart when there is a sudden event, like a heart attack, in someone who is otherwise healthy. Unfortunately, CPR does not typically restart the heart for people who have serious health conditions or who are very sick.\"    \"In the event your heart stopped as a result of an underlying serious health condition, would you want attempts to be made to restart your heart (answer \"yes\" for attempt to resuscitate) or would you prefer a natural death (answer \"no\" for do not attempt to resuscitate)?\" yes       [] Yes   [x] No   Educated Patient / Decision Maker regarding differences between Advance Directives and portable DNR orders.    Length of ACP Conversation in minutes:  5 minutes    Conversation Outcomes:  ACP discussion

## 2024-07-22 NOTE — PROGRESS NOTES
Pt O2 monitor alarmed with sats @ 75%.  Pt was on her phone and speaking with her family.  This RN asked pt to please stop talking on the phone and concentrate on her breathing to get sats WNL. Pt refused.

## 2024-07-22 NOTE — ED NOTES
Oxygen turned up to 3.5 liters  Sat 83 while sleeping with neck leaning forward  encouraged to sit up straight and to take deep breaths  States she feels fine  resp unlabored  she was just concerned due to taking extra bp med

## 2024-07-22 NOTE — PROGRESS NOTES
Pt stated will have RN page RT when ready to be placed on NIV for HS. RT will follow up with pt. Will continue to monitor.

## 2024-07-22 NOTE — CONSULTS
REASON FOR CONSULTATION/CC: chronic hypercapnia      Consult at request of Angelina Kat MD for     PCP: Direct, [facility] Pulmonologist:  None    HISTORY OF PRESENT ILLNESS: Lacey Champagne is a 54 y.o. year old female with a history of  who presents with       Consult placed for acute hypercapnic respiratory failure on chronic hypercapnic respiratory failure.  Based on the ER note, the patient states that she forgot to take her nighttime dose of Coreg and then took an extra this morning.      This note may have been  transcribed using Dragon Dictation software. Please disregard any translational errors.      Assessment:       Chronic hypercapnia  Chronic hypoxemic respiratory failure with baseline 2-3 L nasal cannula  Chart history received  Obesity  Polycythemia    Plan:      Hospital Day 0     Acute hypoxemic respiratory failure saturation less than 90% on room air  Chronic hypoxic respiratory failure baseline   COPD FEV1 48%  Obesity  Acute on chronic hypercapnic respiratory failure pH 7.27, pCO2 97  Patient has had numerous admissions.  Unfortunately, she has not followed up with pulmonary after discharge.  In the past she was referred to for sleep study which was refused  Discussed the use of noninvasive ventilator short-term and long-term..  She has not used this in the past secondary to not be on tolerated.  On last admission, she was able to tolerate the noninvasive ventilator and would like to proceed with trying to get a chronic noninvasive ventilator.  Prior to admission, patient was using Stiolto with as needed albuterol versus DuoNebs     Lacey Champagne agustin chronic hypercapnic respiratory failure secondary to End stage COPD.  PCO2 is 70 at baseline.    Due to severity of disease, Bipap / Bipap ST have been ruled out   Bipap with back up rate has been tried and failed by evidence of continued co2 elevation.  her severe condition will derive the most benefit from NIV.  Without NIV,  underlying malignancy.  Recommend chest  radiograph in 8 weeks to confirm resolution.      CXR portable: Results for orders placed during the hospital encounter of 07/22/24    XR CHEST PORTABLE    Narrative  EXAMINATION:  ONE XRAY VIEW OF THE CHEST    7/22/2024 4:08 am    COMPARISON:  05/11/2024 chest radiograph    HISTORY:  ORDERING SYSTEM PROVIDED HISTORY: Shortness of Breath  TECHNOLOGIST PROVIDED HISTORY:  Reason for exam:->Shortness of Breath  Reason for Exam: Shortness of Breath  Additional signs and symptoms: Shortness of Breath; Hypertension; Tachycardia  - Accidental overdose of bp med.  Relevant Medical/Surgical History: Former smoker.  Hx of anemia, asthma, CHF,  COPD, diabetes, HLD, HTN, & obesity.  No hx of any relevant surgeries.    FINDINGS:  The cardiomediastinal silhouette is normal in size and contour.  No focal  airspace disease.  No pleural effusion or pneumothorax.  No evidence of acute  osseous abnormality.    Impression  No evidence of acute cardiopulmonary disease.

## 2024-07-22 NOTE — PROGRESS NOTES
Pt to ER with concern after an accidental coreg OD. Per pt she woke up at 0300 and realized she had not taken her 2200 coreg dose. Pt took the medication but accidentally took two 12.5mg coreg pills.    On arrival pt is hypertensive with SBP of 202  Tachy at 109  RR 20 and Sats of 78%.    NRB immediately placed with sats returning to 100%.    PIV placed and labs sent  Swabs done and sent    Pt resting w monitor in place

## 2024-07-22 NOTE — ED PROVIDER NOTES
[facility] [place]    CHIEF COMPLAINT  Shortness of Breath, Hypertension, and Tachycardia       HISTORY OF PRESENT ILLNESS  Lacey Champagne is a 54 y.o. female presenting to the ED for medical evaluation for taking extra dose of Coreg.  Patient states she forgot to take her nighttime dose of Coreg and states that she accidentally took 2 tabs of 12.5 mg around 4 AM.  Patient immediately noticed mistake and started googling on the Internet and found out that extra dose of beta-blocker and calcium channel blockers could be dangerous so she came into the emergency department by private vehicle for further evaluation.  Patient denies chest pain.  Patient states her breathing is at baseline however she appears to have increased work of breathing on examination.  Patient states she uses 2 L nasal cannula at home and has a history of COPD.  Patient has a cough at baseline.  Patient denies fever, abdominal pain.  Patient is concerned that she has a urinary tract infection.  Patient denies dysuria but states that she has increased urinary frequency.    - History obtained from: Patient  - Limitations to history: None    I have reviewed the following from the nursing documentation:    Past Medical History:   Diagnosis Date    Anemia     Asthma     Cerebral artery occlusion with cerebral infarction (HCC)     CHF (congestive heart failure) (HCC)     Chronic venous hypertension (idiopathic) with ulcer of bilateral lower extremity (HCC) 4/24/2023    Has wounds bilateral lower legs    COPD (chronic obstructive pulmonary disease) (HCC)     Diabetes (HCC)     Edema     Edema     Foot ulcer (HCC)     HTN (hypertension), benign 11/19/2013    Hyperlipidemia     PAD (peripheral artery disease) ([facility])     Pressure ulcer of right buttock, stage 3 (HCC) 11/22/2022    Thyroid disease      Past Surgical History:   Procedure Laterality Date    HERNIA REPAIR      TUBAL LIGATION      VASCULAR SURGERY Left     left arterial  Source Pulse Respirations SpO2 Height Weight - Scale   (!) 201/86 -- Oral (!) 109 20 97 % -- 97.4 kg (214 lb 11.7 oz)     General: Awake and alert & oriented x 3. No acute distress.   Eyes: No scleral icterus.   HEENT: Atraumatic. Normocephalic. Moist mucous membranes.   CV: Elevated rate.  Regular rhythm.  No murmurs or rubs. Normal S1 and S2.   Resp: Increased respiratory effort, creased aeration in bilateral lung fields, slight wheezing in upper lung fields, no crackles  GI: Soft, nontender to palpation. Nondistended. No rebound tenderness, guarding or masses.   MSK: No deformity, moving all extremities, slight lower extremity edema bilaterally  Neuro:No focal neurological deficits observed. Fluent speech. Symmetric facies. Answers questions appropriately. 5/5 strength in Upper and Lower extremities b/l. Global sensation intact to light touch. Normal gait.   Psych: Appropriate affect, cooperative.     LABS  I have reviewed all labs for this visit.   Results for orders placed or performed during the hospital encounter of 07/22/24   COVID-19, Rapid    Specimen: Nasopharyngeal Swab   Result Value Ref Range    SARS-CoV-2, NAAT Not Detected Not Detected   Rapid influenza A/B antigens    Specimen: Nasopharyngeal   Result Value Ref Range    Rapid Influenza A Ag Negative Negative    Rapid Influenza B Ag Negative Negative   CBC with Auto Differential   Result Value Ref Range    WBC 5.0 4.0 - 11.0 K/uL    RBC 5.90 (H) 4.00 - 5.20 M/uL    Hemoglobin 16.7 (H) 12.0 - 16.0 g/dL    Hematocrit 54.2 (H) 36.0 - 48.0 %    MCV 91.9 80.0 - 100.0 fL    MCH 28.3 26.0 - 34.0 pg    MCHC 30.8 (L) 31.0 - 36.0 g/dL    RDW 16.9 (H) 12.4 - 15.4 %    Platelets 133 (L) 135 - 450 K/uL    MPV 7.4 5.0 - 10.5 fL    Neutrophils % 75.6 %    Lymphocytes % 12.3 %    Monocytes % 5.1 %    Eosinophils % 6.8 %    Basophils % 0.2 %    Neutrophils Absolute 3.8 1.7 - 7.7 K/uL    Lymphocytes Absolute 0.6 (L) 1.0 - 5.1 K/uL    Monocytes Absolute 0.3 0.0 - 1.3

## 2024-07-22 NOTE — ED NOTES
To west per CMT  Awake alert  talkative  skin warm and dry  REsp easy and unlabored  Oxygen 3.5 liters  sat 86-90 on the 3.5 liters  Denies pain other that sore gluteus from the guerney

## 2024-07-22 NOTE — PROGRESS NOTES
Medication Reconciliation    List of medications patient is currently taking is complete.     Source of information: 1. Conversation with patient at bedside                                      2. EPIC records        Notes regarding home medications:   1. Patient concerned with allergy medicine being ordered for this admission  2. PS to hospitalist to review home meds      Stephen John [facility]   7/22/2024  10:29 AM

## 2024-07-22 NOTE — PROGRESS NOTES
RN sent page to MD about NPO order. Is patient allowed to have dinner, patient has been eating and drinking all day, can she be NPO at midnight? Thank you ...awaiting response.

## 2024-07-23 LAB
ANION GAP SERPL CALCULATED.3IONS-SCNC: 6 MMOL/L (ref 3–16)
BASE EXCESS BLDV CALC-SCNC: 14.4 MMOL/L
BASOPHILS # BLD: 0 K/UL (ref 0–0.2)
BASOPHILS NFR BLD: 0.5 %
BUN SERPL-MCNC: 38 MG/DL (ref 7–20)
CALCIUM SERPL-MCNC: 9.3 MG/DL (ref 8.3–10.6)
CHLORIDE SERPL-SCNC: 93 MMOL/L (ref 99–110)
CO2 BLDV-SCNC: 49 MMOL/L
CO2 SERPL-SCNC: 40 MMOL/L (ref 21–32)
COHGB MFR BLDV: 1.7 %
CREAT SERPL-MCNC: 0.9 MG/DL (ref 0.6–1.1)
DEPRECATED RDW RBC AUTO: 16 % (ref 12.4–15.4)
EOSINOPHIL # BLD: 0.2 K/UL (ref 0–0.6)
EOSINOPHIL NFR BLD: 2.7 %
GFR SERPLBLD CREATININE-BSD FMLA CKD-EPI: 75 ML/MIN/{1.73_M2}
GLUCOSE BLD-MCNC: 207 MG/DL (ref 70–99)
GLUCOSE BLD-MCNC: 244 MG/DL (ref 70–99)
GLUCOSE BLD-MCNC: 351 MG/DL (ref 70–99)
GLUCOSE BLD-MCNC: 354 MG/DL (ref 70–99)
GLUCOSE SERPL-MCNC: 215 MG/DL (ref 70–99)
HCO3 BLDV-SCNC: 46 MMOL/L (ref 23–29)
HCT VFR BLD AUTO: 50.1 % (ref 36–48)
HGB BLD-MCNC: 15.7 G/DL (ref 12–16)
LYMPHOCYTES # BLD: 1.1 K/UL (ref 1–5.1)
LYMPHOCYTES NFR BLD: 18.7 %
MCH RBC QN AUTO: 27.8 PG (ref 26–34)
MCHC RBC AUTO-ENTMCNC: 31.4 G/DL (ref 31–36)
MCV RBC AUTO: 88.5 FL (ref 80–100)
METHGB MFR BLDV: 0.4 %
MONOCYTES # BLD: 0.5 K/UL (ref 0–1.3)
MONOCYTES NFR BLD: 7.5 %
NEUTROPHILS # BLD: 4.2 K/UL (ref 1.7–7.7)
NEUTROPHILS NFR BLD: 70.6 %
O2 THERAPY: ABNORMAL
PCO2 BLDV: 90.7 MMHG (ref 40–50)
PERFORMED ON: ABNORMAL
PH BLDV: 7.32 [PH] (ref 7.35–7.45)
PLATELET # BLD AUTO: 116 K/UL (ref 135–450)
PMV BLD AUTO: 7.1 FL (ref 5–10.5)
PO2 BLDV: 34 MMHG
POTASSIUM SERPL-SCNC: 5 MMOL/L (ref 3.5–5.1)
RBC # BLD AUTO: 5.66 M/UL (ref 4–5.2)
SAO2 % BLDV: 59 %
SODIUM SERPL-SCNC: 139 MMOL/L (ref 136–145)
WBC # BLD AUTO: 6 K/UL (ref 4–11)

## 2024-07-23 PROCEDURE — 94760 N-INVAS EAR/PLS OXIMETRY 1: CPT

## 2024-07-23 PROCEDURE — 6360000002 HC RX W HCPCS: Performed by: HOSPITALIST

## 2024-07-23 PROCEDURE — 82803 BLOOD GASES ANY COMBINATION: CPT

## 2024-07-23 PROCEDURE — 6370000000 HC RX 637 (ALT 250 FOR IP): Performed by: HOSPITALIST

## 2024-07-23 PROCEDURE — 1200000000 HC SEMI PRIVATE

## 2024-07-23 PROCEDURE — 6370000000 HC RX 637 (ALT 250 FOR IP): Performed by: STUDENT IN AN ORGANIZED HEALTH CARE EDUCATION/TRAINING PROGRAM

## 2024-07-23 PROCEDURE — 2580000003 HC RX 258: Performed by: STUDENT IN AN ORGANIZED HEALTH CARE EDUCATION/TRAINING PROGRAM

## 2024-07-23 PROCEDURE — 85025 COMPLETE CBC W/AUTO DIFF WBC: CPT

## 2024-07-23 PROCEDURE — 80048 BASIC METABOLIC PNL TOTAL CA: CPT

## 2024-07-23 PROCEDURE — 6370000000 HC RX 637 (ALT 250 FOR IP): Performed by: REGISTERED NURSE

## 2024-07-23 PROCEDURE — 94660 CPAP INITIATION&MGMT: CPT

## 2024-07-23 PROCEDURE — 2580000003 HC RX 258: Performed by: HOSPITALIST

## 2024-07-23 PROCEDURE — 94640 AIRWAY INHALATION TREATMENT: CPT

## 2024-07-23 PROCEDURE — 99233 SBSQ HOSP IP/OBS HIGH 50: CPT | Performed by: INTERNAL MEDICINE

## 2024-07-23 PROCEDURE — 2700000000 HC OXYGEN THERAPY PER DAY

## 2024-07-23 PROCEDURE — 6360000002 HC RX W HCPCS: Performed by: STUDENT IN AN ORGANIZED HEALTH CARE EDUCATION/TRAINING PROGRAM

## 2024-07-23 PROCEDURE — 36415 COLL VENOUS BLD VENIPUNCTURE: CPT

## 2024-07-23 RX ORDER — INSULIN LISPRO 100 [IU]/ML
6 INJECTION, SOLUTION INTRAVENOUS; SUBCUTANEOUS ONCE
Status: COMPLETED | OUTPATIENT
Start: 2024-07-23 | End: 2024-07-23

## 2024-07-23 RX ORDER — INSULIN LISPRO 100 [IU]/ML
4 INJECTION, SOLUTION INTRAVENOUS; SUBCUTANEOUS
Status: DISCONTINUED | OUTPATIENT
Start: 2024-07-23 | End: 2024-07-24

## 2024-07-23 RX ADMIN — IPRATROPIUM BROMIDE AND ALBUTEROL SULFATE 1 DOSE: 2.5; .5 SOLUTION RESPIRATORY (INHALATION) at 12:16

## 2024-07-23 RX ADMIN — CETIRIZINE HYDROCHLORIDE 10 MG: 10 TABLET, FILM COATED ORAL at 08:47

## 2024-07-23 RX ADMIN — INSULIN LISPRO 4 UNITS: 100 INJECTION, SOLUTION INTRAVENOUS; SUBCUTANEOUS at 18:05

## 2024-07-23 RX ADMIN — Medication 1 DROP: at 12:52

## 2024-07-23 RX ADMIN — INSULIN GLARGINE 20 UNITS: 100 INJECTION, SOLUTION SUBCUTANEOUS at 08:46

## 2024-07-23 RX ADMIN — SODIUM CHLORIDE, PRESERVATIVE FREE 10 ML: 5 INJECTION INTRAVENOUS at 08:48

## 2024-07-23 RX ADMIN — HYDROCHLOROTHIAZIDE 12.5 MG: 25 TABLET ORAL at 08:47

## 2024-07-23 RX ADMIN — PREDNISONE 40 MG: 20 TABLET ORAL at 08:47

## 2024-07-23 RX ADMIN — IPRATROPIUM BROMIDE AND ALBUTEROL SULFATE 1 DOSE: 2.5; .5 SOLUTION RESPIRATORY (INHALATION) at 07:56

## 2024-07-23 RX ADMIN — ENOXAPARIN SODIUM 40 MG: 100 INJECTION SUBCUTANEOUS at 21:13

## 2024-07-23 RX ADMIN — INSULIN LISPRO 6 UNITS: 100 INJECTION, SOLUTION INTRAVENOUS; SUBCUTANEOUS at 22:24

## 2024-07-23 RX ADMIN — CARVEDILOL 6.25 MG: 6.25 TABLET, FILM COATED ORAL at 08:47

## 2024-07-23 RX ADMIN — CARVEDILOL 6.25 MG: 6.25 TABLET, FILM COATED ORAL at 18:07

## 2024-07-23 RX ADMIN — LEVOTHYROXINE SODIUM 50 MCG: 0.05 TABLET ORAL at 08:48

## 2024-07-23 RX ADMIN — IPRATROPIUM BROMIDE AND ALBUTEROL SULFATE 1 DOSE: 2.5; .5 SOLUTION RESPIRATORY (INHALATION) at 19:57

## 2024-07-23 RX ADMIN — Medication 1 DROP: at 18:07

## 2024-07-23 RX ADMIN — INSULIN LISPRO 1 UNITS: 100 INJECTION, SOLUTION INTRAVENOUS; SUBCUTANEOUS at 08:47

## 2024-07-23 RX ADMIN — WATER 1000 MG: 1 INJECTION INTRAMUSCULAR; INTRAVENOUS; SUBCUTANEOUS at 18:05

## 2024-07-23 RX ADMIN — INSULIN LISPRO 4 UNITS: 100 INJECTION, SOLUTION INTRAVENOUS; SUBCUTANEOUS at 18:20

## 2024-07-23 RX ADMIN — LOSARTAN POTASSIUM 50 MG: 50 TABLET, FILM COATED ORAL at 08:48

## 2024-07-23 RX ADMIN — SODIUM CHLORIDE, PRESERVATIVE FREE 10 ML: 5 INJECTION INTRAVENOUS at 22:25

## 2024-07-23 RX ADMIN — INSULIN LISPRO 4 UNITS: 100 INJECTION, SOLUTION INTRAVENOUS; SUBCUTANEOUS at 22:24

## 2024-07-23 RX ADMIN — KETOROLAC TROMETHAMINE 30 MG: 30 INJECTION, SOLUTION INTRAMUSCULAR at 18:05

## 2024-07-23 RX ADMIN — AZITHROMYCIN 500 MG: 500 TABLET, FILM COATED ORAL at 08:47

## 2024-07-23 RX ADMIN — INSULIN LISPRO 1 UNITS: 100 INJECTION, SOLUTION INTRAVENOUS; SUBCUTANEOUS at 12:52

## 2024-07-23 ASSESSMENT — PAIN DESCRIPTION - ONSET
ONSET: ON-GOING
ONSET: ON-GOING

## 2024-07-23 ASSESSMENT — PAIN DESCRIPTION - DESCRIPTORS
DESCRIPTORS: ACHING
DESCRIPTORS: ACHING

## 2024-07-23 ASSESSMENT — PAIN SCALES - GENERAL
PAINLEVEL_OUTOF10: 2
PAINLEVEL_OUTOF10: 0
PAINLEVEL_OUTOF10: 4
PAINLEVEL_OUTOF10: 0

## 2024-07-23 ASSESSMENT — PAIN DESCRIPTION - LOCATION
LOCATION: KNEE
LOCATION: KNEE

## 2024-07-23 ASSESSMENT — PAIN DESCRIPTION - PAIN TYPE
TYPE: CHRONIC PAIN
TYPE: CHRONIC PAIN

## 2024-07-23 ASSESSMENT — PAIN DESCRIPTION - FREQUENCY
FREQUENCY: CONTINUOUS
FREQUENCY: CONTINUOUS

## 2024-07-23 ASSESSMENT — PAIN DESCRIPTION - ORIENTATION
ORIENTATION: RIGHT;LEFT
ORIENTATION: RIGHT;LEFT

## 2024-07-23 NOTE — PROGRESS NOTES
Patient resting in chair this morning w/o complaint. Patient denies pain. Scheduled morning medications administered per orders - see eMAR for documentation. Head to toe assessment completed and charted, see flowsheets for documentation. Preventative mepilex applied to patient R buttock at site of old pressure injury. No wound / open skin present at this time.     Patient denies further physical/emotional needs at this time. Call light, telephone, and bed side table are within reach. Fall precautions in place. Will continue to monitor and assess.

## 2024-07-23 NOTE — PROGRESS NOTES
Bedside introduction complete. Patient denies any needs at this time. Updated whiteboard with RN and PCA name and number. Patient able to make needs known, using call light appropriately.

## 2024-07-23 NOTE — CARE COORDINATION
Aware from Respiratory Therapist that patient will require a concentrator that goes up to 10L due to patient's current O2 need of 5L at rest and 6L with exertion.   MICHAEL from [facility] present at the hospital speaking with patient about the NIV. Spoke with PJ regarding home O2 needs and NIV.   She reported the NIV is arranged and will be delivered to patient on discharge. Informed PJ that patient is possibly ready for discharge today.    PJ reported patient will need a home concentrator that would go to 10L as she currently requires 6L O2 with exertion per Respiratory Therapy note.     Informed Dr. Ojeda regarding above and for new home O2 order.  Informed Mary at [facility] regarding referral for O2. She is working on it now.  She is able to pull the order from epic. Informed Mary regarding possible discharge today.     Electronically signed by NISA Perez, SANDY, Case Management on 7/23/2024 at 12:58 PM  [place] [phone]    1:50 PM  Spoke with attending MD--patient not discharging today. Informed Mary at [facility].    Electronically signed by NISA Perez, SANDY, Case Management on 7/23/2024 at 1:51 PM  [place] [phone]

## 2024-07-23 NOTE — DISCHARGE INSTR - COC
Continuity of Care Form    Patient Name: Lacey Champagne   :  1970  MRN:  [id number]    Admit date:  2024  Discharge date:  ***    Code Status Order: Full Code   Advance Directives:     Admitting Physician:  Panda Valdez MD  PCP: Direct, [facility]    Discharging Nurse: ***  Discharging Hospital Unit/Room#: I9W-9440/3104-01  Discharging Unit Phone Number: ***    Emergency Contact:   Extended Emergency Contact Information  Primary Emergency Contact: David Rivera  Home Phone: [phone]  Mobile Phone: [phone]  Relation: Child  Preferred language: English   needed? No  Secondary Emergency Contact: Courtney Champagne  Home Phone: [phone]  Relation: Child    Past Surgical History:  Past Surgical History:   Procedure Laterality Date    HERNIA REPAIR      TUBAL LIGATION      VASCULAR SURGERY Left     left arterial stent       Immunization History:     There is no immunization history on file for this patient.    Active Problems:  Patient Active Problem List   Diagnosis Code    Vision disturbance following cerebrovascular accident I69.398, H53.9    DMII (diabetes mellitus, type 2) ([facility]) E11.9    HTN (hypertension), benign I10    Cellulitis of buttock L03.317    Cellulitis and abscess of buttock L02.31, L03.317    Pressure ulcer of right buttock, stage 3 ([facility]) L89.313    Skin ulcer of left lower leg, limited to breakdown of skin ([facility]) L97.921    Skin ulcer of right lower leg, limited to breakdown of skin ([facility]) L97.911    Diabetic ulcer of toe of left foot associated with type 2 diabetes mellitus, with fat layer exposed ([facility]) E11.621, L97.522    Leg swelling M79.89    PAD (peripheral artery disease) ([facility]) I73.9    Chronic venous hypertension (idiopathic) with ulcer of bilateral lower extremity ([facility]) I87.313, L97.919, L97.929    Edema R60.9    Localized edema R60.0    Metabolic encephalopathy G93.41    Acute hypoxemic respiratory failure ([facility]) J96.01    COPD exacerbation ([facility]) J44.1     Chronic respiratory failure with hypercapnia ([facility]) J96.12    Cellulitis of right foot L03.115    Morbid obesity due to excess calories ([facility]) E66.01    Skin ulcer of toe of right foot with fat layer exposed ([facility]) L97.512    Type 2 diabetes mellitus with right diabetic foot infection ([facility]) E11.628, L08.9    SOB (shortness of breath) R06.02    Acute on chronic respiratory failure with hypoxia and hypercapnia ([facility]) J96.21, J96.22    Acute bronchitis due to Haemophilus influenzae J20.1    MRSA infection A49.02    Acute laryngitis due to Haemophilus influenzae J04.0, B96.3    Poorly controlled diabetes mellitus ([facility]) E11.65    On supplemental oxygen by nasal cannula Z78.9       Isolation/Infection:   Isolation            Contact          Patient Infection Status       Infection Onset Added Last Indicated Last Indicated By Review Planned Expiration Resolved Resolved By    MRSA 24 Pneumonia Panel, Molecular        Resolved    Influenza 24 Pneumonia Panel, Molecular   24 Infection     Haemophilus influenza 24 Pneumonia Panel, Molecular   24 Infection                        Nurse Assessment:  Last Vital Signs: /71   Pulse 84   Temp 97.8 °F (36.6 °C)   Resp 18   Ht 1.6 m (5' 3\")   Wt 99.7 kg (219 lb 12.8 oz)   LMP  (Exact Date)   SpO2 94%   BMI 38.94 kg/m²     Last documented pain score (0-10 scale): Pain Level: 0  Last Weight:   Wt Readings from Last 1 Encounters:   24 99.7 kg (219 lb 12.8 oz)     Mental Status:  {IP PT MENTAL STATUS:}    IV Access:  { TORRIE IV ACCESS:812267242}    Nursing Mobility/ADLs:  Walking   {CHP DME ADLs:857010693}  Transfer  {CHP DME ADLs:376946657}  Bathing  {CHP DME ADLs:809555314}  Dressing  {CHP DME ADLs:394248894}  Toileting  {CHP DME ADLs:630387691}  Feeding  {CHP DME ADLs:565509712}  Med Admin  {P DME ADLs:567244804}  Med Delivery   {[facility] MED

## 2024-07-23 NOTE — PROGRESS NOTES
Message sent to MD Ahuja to alert to glucose of 354 w/ dinner check. MD provided order for additional 4 units of prandial Humalog to be administered in addition to sliding scale. Patient updated w/ plan of care; reports understanding & is agreeable. Medication administered w/o complication. See eMAR for documentation.     Patient resting in chair at this time w/o complaint. Patient remains on 5L of O2 via NC to maintain oxygen > 90%. Patient ambulates independently throughout the room w/o device. Patient denies further needs at this time. Will continue to monitor and assess.

## 2024-07-23 NOTE — PROGRESS NOTES
V2.0  [facility] Hospitalist Progress Note      Name:  Lacey Champagne /Age/Sex: 1970  (54 y.o. female)   MRN & CSN:  [id number] & [id number] Encounter Date/Time: 2024 8:33 AM EDT    Location:  [id number]-9243/3104-01 PCP: [place], [facility]       Hospital Day: 2    Assessment and Plan:   Lacey Champagne is a 54 y.o. female with history of COPD presenting with shortness of breath      Plan:  Acute on chronic hypercapnic respiratory failure  -2/2 COPD + untreated Sleep apnea  -Pulmonology was consulted  -Patient has previously refused outpatient sleep study  -After discussion with pulm patient would like to proceed with getting a chronic noninvasive ventilator  -Tolerated NIV for 6 hours overnight; blood gas with some improvement this morning  -per pulm patient can be discharged when home NIV is set up  Acute chronic hypoxic respiratory failure  -Baseline uses 2 to 3 L NC; up to 5 L this a.m.  -Due to COPD  -Home O2 evaluation prior to discharge  COPD, FEV1 48%  -Prednisone, DuoNebs, azithromycin  Cystitis  -Patient reporting itching with urination  -Urine culture positive for E. coli sensitivity pending  -Ceftriaxone  Polycythemia  -Likely due to chronic hypoxia/untreated sleep apnea    Ppx: Lovenox  Dispo: Home    Subjective:     Chief Complaint: Shortness of Breath, Hypertension, and Tachycardia     Interval history  Patient reports feeling breathing is not quite where it should be.  Also stating she has been getting itching with urination.  Denies dysuria, urinary frequency, fevers chills  Brief HPI  54 F PMH stroke, COPD, T2DM, HTN, PAD, hypothyroidism, HFpEF (recovered) P/W shortness of breath.  Patient accidentally took an extra dose of Coreg became very anxious and presented to the ED.  Noted to have increased work of breathing on arrival.  Reported cough and shortness of breath, but denied chest pain, fever, abdominal pain, urinary symptoms.  Laboratory workup notable for respiratory acidosis, and  mmol/L    Chloride 93 (L) 99 - 110 mmol/L    CO2 40 (H) 21 - 32 mmol/L    Anion Gap 6 3 - 16    Glucose 215 (H) 70 - 99 mg/dL    BUN 38 (H) 7 - 20 mg/dL    Creatinine 0.9 0.6 - 1.1 mg/dL    Est, Glom Filt Rate 75 >60    Calcium 9.3 8.3 - 10.6 mg/dL   Blood Gas, Venous    Collection Time: 07/23/24  5:46 AM   Result Value Ref Range    pH, Fodr 7.315 (L) 7.350 - 7.450    pCO2, Ford 90.7 (H) 40.0 - 50.0 mmHg    PO2, Ford 34 Not Established mmHg    HCO3, Venous 46 (H) 23 - 29 mmol/L    Base Excess, Ford 14.4 Not Established mmol/L    O2 Sat, Ford 59 Not Established %    Carboxyhemoglobin 1.7 %    MetHgb, Ford 0.4 <1.5 %    TC02 (Calc), Ford 49 Not Established mmol/L    O2 Therapy Unknown    POCT Glucose    Collection Time: 07/23/24  6:49 AM   Result Value Ref Range    POC Glucose 207 (H) 70 - 99 mg/dl    Performed on [facility]         Imaging/Diagnostics Last 24 Hours   XR CHEST PORTABLE    Result Date: 7/22/2024  EXAMINATION: ONE XRAY VIEW OF THE CHEST 7/22/2024 4:08 am COMPARISON: 05/11/2024 chest radiograph HISTORY: ORDERING SYSTEM PROVIDED HISTORY: Shortness of Breath TECHNOLOGIST PROVIDED HISTORY: Reason for exam:->Shortness of Breath Reason for Exam: Shortness of Breath Additional signs and symptoms: Shortness of Breath; Hypertension; Tachycardia - Accidental overdose of bp med. Relevant Medical/Surgical History: Former smoker.  Hx of anemia, asthma, CHF, COPD, diabetes, HLD, HTN, & obesity.  No hx of any relevant surgeries. FINDINGS: The cardiomediastinal silhouette is normal in size and contour.  No focal airspace disease.  No pleural effusion or pneumothorax.  No evidence of acute osseous abnormality.     No evidence of acute cardiopulmonary disease.       Electronically signed by Emir Ahuja MD on 7/23/2024 at 8:33 AM

## 2024-07-23 NOTE — PROGRESS NOTES
Patient dinner glucose 354. Message sent to MD Ahuja to alert to critical value. Order placed by MD for 4 units prandial Humalog. Medication administered per orders. See eMAR for documentation. Patient aware of change in dosing and is agreeable.

## 2024-07-23 NOTE — PROGRESS NOTES
This RN removed dinner tray from patient room. Patient complained that the meal delivered was not what she ordered and she would like 2 turkey sandwiches instead. Requested items provided to patient at that time.     Now patient is asking for additional snacks before being placed on bipap this evening. Educated patient on low carb diet as she was requesting two more turkey sandwiches. Patient requested blood sugar be checked and if improved from earlier she would like sandwiches. Blood glucose now 335, up from night time glucose of 303. Provided patient with a sugar free snack at this time.     Patient states she will call when ready to be placed on bipap and denies further needs at this time.

## 2024-07-23 NOTE — PROGRESS NOTES
Patient reporting increased L eye drainage / redness this morning, requesting eye drops PRN to help manage symptom. Message sent to MD Seymour bernardo/ request. Waiting for response

## 2024-07-23 NOTE — PROGRESS NOTES
Pulmonary Progress Note    Date of Admission: 7/22/2024   LOS: 1 day       CC:  Chief Complaint   Patient presents with    Shortness of Breath    Hypertension    Tachycardia        Subjective:  Used NIV lat night for 6 hours     ROS:   No nausea  No Vomiting  No chest pain      Assessment:     Chronic hypercapnia  Chronic hypoxemic respiratory failure with baseline 2-3 L nasal cannula  Chart history received  Obesity  Polycythemia    Plan:     This note may have been transcribed using Dragon Dictation software. Please disregard any translational errors.       Hospital Day: 1     Acute hypoxemic respiratory failure saturation less than 90% on room air  Chronic hypoxic respiratory failure baseline   COPD FEV1 48%  Obesity  Acute on chronic hypercapnic respiratory failure pH 7.27, pCO2 97  Patient has had numerous admissions.  Unfortunately, she has not followed up with pulmonary after discharge.  Used NIV for 6 hours over night.  Despite uses, has not corrected with pco2 90.    Home o2 eval ordered and d/w RT.    Patient can likely be d/c as soon as home NIV set up      Prior to admission, patient was using Stiolto with as needed albuterol versus DuoNebs      Future Appointments   Date Time Provider Department Center   7/29/2024  1:00 PM Narayan Ojeda MD  PULM [facility]                Data:        PHYSICAL EXAM:   Blood pressure 137/71, pulse 84, temperature 97.8 °F (36.6 °C), resp. rate 18, height 1.6 m (5' 3\"), weight 99.7 kg (219 lb 12.8 oz), SpO2 94 %.'  Body mass index is 38.94 kg/m².   Gen: No distress.    ENT:   Resp: No accessory muscle use. No crackles. No wheezes. No rhonchi.    CV: Regular rate. Regular rhythm. No murmur or rub. No edema.   Skin: Warm, dry, normal texture and turgor. No nodule on exposed extremities.   M/S: No cyanosis. No clubbing. No joint deformity.  Psych: Oriented x 3. No anxiety.  Awake. Alert. Intact judgement and insight. Good Mood / Affect.  Memory appears in tact

## 2024-07-23 NOTE — PROGRESS NOTES
[facility]    Respiratory Therapy   Home Oxygen Evaluation        Name: Lacey Champagne  Medical Record Number: [id number]  Age: 54 y.o.  Gender:  female   : 1970  Today's date: 2024  Room: O7Z-0479/3104-01      Assessment        /71   Pulse 80   Temp 98.6 °F (37 °C) (Oral)   Resp 18   Ht 1.6 m (5' 3\")   Wt 99.7 kg (219 lb 12.8 oz)   LMP  (Exact Date)   SpO2 90%   BMI 38.94 kg/m²     Patient Active Problem List   Diagnosis    Vision disturbance following cerebrovascular accident    DMII (diabetes mellitus, type 2) (HCC)    HTN (hypertension), benign    Cellulitis of buttock    Cellulitis and abscess of buttock    Pressure ulcer of right buttock, stage 3 (HCC)    Skin ulcer of left lower leg, limited to breakdown of skin (HCC)    Skin ulcer of right lower leg, limited to breakdown of skin (HCC)    Diabetic ulcer of toe of left foot associated with type 2 diabetes mellitus, with fat layer exposed (HCC)    Leg swelling    PAD (peripheral artery disease) (HCC)    Chronic venous hypertension (idiopathic) with ulcer of bilateral lower extremity (HCC)    Edema    Localized edema    Metabolic encephalopathy    Acute hypoxemic respiratory failure (HCC)    COPD exacerbation (HCC)    Chronic respiratory failure with hypercapnia (HCC)    Cellulitis of right foot    Morbid obesity due to excess calories (HCC)    Skin ulcer of toe of right foot with fat layer exposed (HCC)    Type 2 diabetes mellitus with right diabetic foot infection (HCC)    SOB (shortness of breath)    Acute on chronic respiratory failure with hypoxia and hypercapnia (HCC)    Acute bronchitis due to Haemophilus influenzae    MRSA infection    Acute laryngitis due to Haemophilus influenzae    Poorly controlled diabetes mellitus (HCC)    On supplemental oxygen by nasal cannula       Social History:  Social History     Tobacco Use    Smoking status: Former     Current packs/day: 0.00     Average packs/day: 1

## 2024-07-24 VITALS
WEIGHT: 219.8 LBS | OXYGEN SATURATION: 93 % | BODY MASS INDEX: 38.95 KG/M2 | TEMPERATURE: 98.5 F | DIASTOLIC BLOOD PRESSURE: 72 MMHG | RESPIRATION RATE: 16 BRPM | HEIGHT: 63 IN | SYSTOLIC BLOOD PRESSURE: 157 MMHG | HEART RATE: 73 BPM

## 2024-07-24 LAB
ANION GAP SERPL CALCULATED.3IONS-SCNC: 7 MMOL/L (ref 3–16)
BACTERIA UR CULT: ABNORMAL
BASE EXCESS BLDV CALC-SCNC: 9.4 MMOL/L
BUN SERPL-MCNC: 45 MG/DL (ref 7–20)
CALCIUM SERPL-MCNC: 9 MG/DL (ref 8.3–10.6)
CHLORIDE SERPL-SCNC: 92 MMOL/L (ref 99–110)
CO2 BLDV-SCNC: 41 MMOL/L
CO2 SERPL-SCNC: 34 MMOL/L (ref 21–32)
COHGB MFR BLDV: 1.6 %
CREAT SERPL-MCNC: 1 MG/DL (ref 0.6–1.1)
GFR SERPLBLD CREATININE-BSD FMLA CKD-EPI: 66 ML/MIN/{1.73_M2}
GLUCOSE BLD-MCNC: 153 MG/DL (ref 70–99)
GLUCOSE BLD-MCNC: 255 MG/DL (ref 70–99)
GLUCOSE BLD-MCNC: 271 MG/DL (ref 70–99)
GLUCOSE BLD-MCNC: 366 MG/DL (ref 70–99)
GLUCOSE BLD-MCNC: 415 MG/DL (ref 70–99)
GLUCOSE SERPL-MCNC: 509 MG/DL (ref 70–99)
HCO3 BLDV-SCNC: 39 MMOL/L (ref 23–29)
METHGB MFR BLDV: 0.1 %
O2 THERAPY: ABNORMAL
ORGANISM: ABNORMAL
PCO2 BLDV: 70.3 MMHG (ref 40–50)
PERFORMED ON: ABNORMAL
PH BLDV: 7.35 [PH] (ref 7.35–7.45)
PO2 BLDV: 61 MMHG
POTASSIUM SERPL-SCNC: 5.6 MMOL/L (ref 3.5–5.1)
SAO2 % BLDV: 90 %
SODIUM SERPL-SCNC: 133 MMOL/L (ref 136–145)

## 2024-07-24 PROCEDURE — 2580000003 HC RX 258: Performed by: STUDENT IN AN ORGANIZED HEALTH CARE EDUCATION/TRAINING PROGRAM

## 2024-07-24 PROCEDURE — 94660 CPAP INITIATION&MGMT: CPT

## 2024-07-24 PROCEDURE — 80048 BASIC METABOLIC PNL TOTAL CA: CPT

## 2024-07-24 PROCEDURE — 6360000002 HC RX W HCPCS: Performed by: HOSPITALIST

## 2024-07-24 PROCEDURE — 6370000000 HC RX 637 (ALT 250 FOR IP): Performed by: HOSPITALIST

## 2024-07-24 PROCEDURE — 6360000002 HC RX W HCPCS: Performed by: STUDENT IN AN ORGANIZED HEALTH CARE EDUCATION/TRAINING PROGRAM

## 2024-07-24 PROCEDURE — 99232 SBSQ HOSP IP/OBS MODERATE 35: CPT | Performed by: INTERNAL MEDICINE

## 2024-07-24 PROCEDURE — 2700000000 HC OXYGEN THERAPY PER DAY

## 2024-07-24 PROCEDURE — 36415 COLL VENOUS BLD VENIPUNCTURE: CPT

## 2024-07-24 PROCEDURE — 94640 AIRWAY INHALATION TREATMENT: CPT

## 2024-07-24 PROCEDURE — 94761 N-INVAS EAR/PLS OXIMETRY MLT: CPT

## 2024-07-24 PROCEDURE — 82803 BLOOD GASES ANY COMBINATION: CPT

## 2024-07-24 PROCEDURE — 6370000000 HC RX 637 (ALT 250 FOR IP): Performed by: STUDENT IN AN ORGANIZED HEALTH CARE EDUCATION/TRAINING PROGRAM

## 2024-07-24 PROCEDURE — 2580000003 HC RX 258: Performed by: HOSPITALIST

## 2024-07-24 RX ORDER — GLUCOSAMINE HCL/CHONDROITIN SU 500-400 MG
CAPSULE ORAL
Qty: 200 STRIP | Refills: 3 | Status: SHIPPED | OUTPATIENT
Start: 2024-07-24 | End: 2024-08-23

## 2024-07-24 RX ORDER — BLOOD-GLUCOSE SENSOR
1 EACH MISCELLANEOUS
Qty: 2 EACH | Refills: 3 | Status: SHIPPED | OUTPATIENT
Start: 2024-07-24

## 2024-07-24 RX ORDER — INSULIN LISPRO 100 [IU]/ML
8 INJECTION, SOLUTION INTRAVENOUS; SUBCUTANEOUS
Status: DISCONTINUED | OUTPATIENT
Start: 2024-07-24 | End: 2024-07-24 | Stop reason: HOSPADM

## 2024-07-24 RX ORDER — CEFDINIR 300 MG/1
300 CAPSULE ORAL 2 TIMES DAILY
Qty: 10 CAPSULE | Refills: 0 | Status: SHIPPED | OUTPATIENT
Start: 2024-07-24 | End: 2024-07-29

## 2024-07-24 RX ORDER — IPRATROPIUM BROMIDE AND ALBUTEROL SULFATE 2.5; .5 MG/3ML; MG/3ML
1 SOLUTION RESPIRATORY (INHALATION) EVERY 6 HOURS PRN
Qty: 360 ML | Refills: 0 | Status: SHIPPED | OUTPATIENT
Start: 2024-07-24

## 2024-07-24 RX ORDER — INSULIN LISPRO 100 [IU]/ML
10 INJECTION, SOLUTION INTRAVENOUS; SUBCUTANEOUS ONCE
Status: COMPLETED | OUTPATIENT
Start: 2024-07-24 | End: 2024-07-24

## 2024-07-24 RX ORDER — PREDNISONE 20 MG/1
40 TABLET ORAL DAILY
Qty: 6 TABLET | Refills: 0 | Status: SHIPPED | OUTPATIENT
Start: 2024-07-25 | End: 2024-07-28

## 2024-07-24 RX ORDER — INSULIN LISPRO 100 [IU]/ML
0-4 INJECTION, SOLUTION INTRAVENOUS; SUBCUTANEOUS NIGHTLY
Status: DISCONTINUED | OUTPATIENT
Start: 2024-07-24 | End: 2024-07-24 | Stop reason: HOSPADM

## 2024-07-24 RX ORDER — INSULIN LISPRO 100 [IU]/ML
0-8 INJECTION, SOLUTION INTRAVENOUS; SUBCUTANEOUS
Status: DISCONTINUED | OUTPATIENT
Start: 2024-07-24 | End: 2024-07-24 | Stop reason: HOSPADM

## 2024-07-24 RX ORDER — DIPHENHYDRAMINE HYDROCHLORIDE 25 MG/1
1 CAPSULE, LIQUID FILLED ORAL
Qty: 1 KIT | Refills: 0 | Status: SHIPPED | OUTPATIENT
Start: 2024-07-24

## 2024-07-24 RX ORDER — KETOROLAC TROMETHAMINE 30 MG/ML
1 INJECTION, SOLUTION INTRAMUSCULAR; INTRAVENOUS CONTINUOUS
Qty: 1 EACH | Refills: 3 | Status: SHIPPED | OUTPATIENT
Start: 2024-07-24

## 2024-07-24 RX ADMIN — LEVOTHYROXINE SODIUM 50 MCG: 0.05 TABLET ORAL at 08:39

## 2024-07-24 RX ADMIN — KETOROLAC TROMETHAMINE 30 MG: 30 INJECTION, SOLUTION INTRAMUSCULAR at 12:00

## 2024-07-24 RX ADMIN — LOSARTAN POTASSIUM 50 MG: 50 TABLET, FILM COATED ORAL at 08:39

## 2024-07-24 RX ADMIN — SODIUM CHLORIDE, PRESERVATIVE FREE 10 ML: 5 INJECTION INTRAVENOUS at 15:23

## 2024-07-24 RX ADMIN — INSULIN LISPRO 2 UNITS: 100 INJECTION, SOLUTION INTRAVENOUS; SUBCUTANEOUS at 11:59

## 2024-07-24 RX ADMIN — AZITHROMYCIN 500 MG: 500 TABLET, FILM COATED ORAL at 08:39

## 2024-07-24 RX ADMIN — INSULIN LISPRO 4 UNITS: 100 INJECTION, SOLUTION INTRAVENOUS; SUBCUTANEOUS at 11:59

## 2024-07-24 RX ADMIN — Medication 1 DROP: at 14:02

## 2024-07-24 RX ADMIN — INSULIN LISPRO 4 UNITS: 100 INJECTION, SOLUTION INTRAVENOUS; SUBCUTANEOUS at 08:39

## 2024-07-24 RX ADMIN — INSULIN GLARGINE 20 UNITS: 100 INJECTION, SOLUTION SUBCUTANEOUS at 08:39

## 2024-07-24 RX ADMIN — SODIUM CHLORIDE, PRESERVATIVE FREE 10 ML: 5 INJECTION INTRAVENOUS at 08:40

## 2024-07-24 RX ADMIN — INSULIN LISPRO 8 UNITS: 100 INJECTION, SOLUTION INTRAVENOUS; SUBCUTANEOUS at 17:10

## 2024-07-24 RX ADMIN — PREDNISONE 40 MG: 20 TABLET ORAL at 08:39

## 2024-07-24 RX ADMIN — WATER 1000 MG: 1 INJECTION INTRAMUSCULAR; INTRAVENOUS; SUBCUTANEOUS at 15:22

## 2024-07-24 RX ADMIN — INSULIN LISPRO 10 UNITS: 100 INJECTION, SOLUTION INTRAVENOUS; SUBCUTANEOUS at 14:16

## 2024-07-24 RX ADMIN — HYDROCHLOROTHIAZIDE 12.5 MG: 25 TABLET ORAL at 08:39

## 2024-07-24 RX ADMIN — CARVEDILOL 6.25 MG: 6.25 TABLET, FILM COATED ORAL at 17:10

## 2024-07-24 RX ADMIN — CETIRIZINE HYDROCHLORIDE 10 MG: 10 TABLET, FILM COATED ORAL at 08:39

## 2024-07-24 RX ADMIN — Medication 1 DROP: at 08:39

## 2024-07-24 RX ADMIN — IPRATROPIUM BROMIDE AND ALBUTEROL SULFATE 1 DOSE: 2.5; .5 SOLUTION RESPIRATORY (INHALATION) at 09:05

## 2024-07-24 RX ADMIN — IPRATROPIUM BROMIDE AND ALBUTEROL SULFATE 1 DOSE: 2.5; .5 SOLUTION RESPIRATORY (INHALATION) at 16:29

## 2024-07-24 ASSESSMENT — PAIN DESCRIPTION - ONSET
ONSET: ON-GOING
ONSET: ON-GOING

## 2024-07-24 ASSESSMENT — PAIN SCALES - GENERAL
PAINLEVEL_OUTOF10: 0
PAINLEVEL_OUTOF10: 3
PAINLEVEL_OUTOF10: 5

## 2024-07-24 ASSESSMENT — PAIN DESCRIPTION - FREQUENCY
FREQUENCY: CONTINUOUS
FREQUENCY: CONTINUOUS

## 2024-07-24 ASSESSMENT — PAIN DESCRIPTION - DESCRIPTORS
DESCRIPTORS: ACHING
DESCRIPTORS: ACHING

## 2024-07-24 ASSESSMENT — PAIN DESCRIPTION - LOCATION
LOCATION: KNEE
LOCATION: KNEE

## 2024-07-24 ASSESSMENT — PAIN DESCRIPTION - ORIENTATION
ORIENTATION: RIGHT;LEFT
ORIENTATION: RIGHT;LEFT

## 2024-07-24 ASSESSMENT — PAIN DESCRIPTION - PAIN TYPE
TYPE: CHRONIC PAIN
TYPE: CHRONIC PAIN

## 2024-07-24 NOTE — PROGRESS NOTES
Patient O2 increased from 2L to 4.5L via NC to maintain oxygen saturation > 90% at rest. Patient denies SOB at this time.     Discharge order noted. Patient states that she needs home O2 set up as her current portable tank is unable to accommodate the required 4.5L of oxygen required to maintain O2 saturation > 90%. This RN to speak w/ SW to see when this can be set up. Medications to be delivered to unit from retail pharmacy at approximately 1400 for discharge.

## 2024-07-24 NOTE — PROGRESS NOTES
Call placed to Lauren, diabetic educator, to ensure that consult was received to discuss CGMs prior to d/c with patient. Lauren states that she is aware and will be by to see patient shortly.

## 2024-07-24 NOTE — PROGRESS NOTES
POC glucose recheck 366 after 10 additional units of Humalog. Lab results in from BMP & VBG. MD Ahuja made aware, waiting for response

## 2024-07-24 NOTE — PROGRESS NOTES
Pt agreeable to wearing NIV. Pt declined skin barrier for bridge of nose. Continuous pulse ox placed while pt on NIV. Will continue to monitor.

## 2024-07-24 NOTE — PROGRESS NOTES
MD states that patient is okay for d/c tonight as glucose has improved & labs have been reviewed. Patient made aware, patient agreeable to d/c at this time. States that she will call family to transport her.     This RN confirmed w/ SW Suzan that patient will have a concentrator delivered to her home tonight. Patient has 2 portable oxygen tanks in the room for discharge at this time.     Patient denies further needs. Will continue to monitor and assess.

## 2024-07-24 NOTE — PROGRESS NOTES
Patient up to chair this morning, denies pain. Scheduled morning medications administered per orders, see eMAR for documentation. Patient tolerated PO medications whole w/ diet starry.     Head to toe assessment completed and charted. Patient remains on 5L of O2 via NC to maintain oxygen saturation > 90%. Will attempt to wean as patient tolerates throughout this shift. Patient reports that she is coughing up thick clear stringy sputum this morning. Patient believes this is r/t Bipap use. Bipap removed at approximately 0730 when patient awoke for the day.     Patient denies future physical/emotional needs at this time. Call light, telephone, and bed side table are within reach. Fall precautions in place. Will continue to monitor and assess.

## 2024-07-24 NOTE — PROGRESS NOTES
[facility]  Diabetes Education   Progress Note       NAME:  Lacey Champagne  MEDICAL RECORD NUMBER:  [id number]  AGE: 54 y.o.   GENDER: female  : 1970  TODAY'S DATE:  2024    Subjective   Reason for Diabetic Education Evaluation and Assessment: glucose monitoring     Lacey is planning discharge to home. She expresses frustration with her current CGM system - sensors falling off too soon.  She is interested in other CGM options.  She does not have fingerstick supplies at home.      Visit Type: evaluation      Lacey Champagne is a 54 y.o. female referred by:     [x] Physician  [] Nursing  [] Chart Review   [] Other:     PAST MEDICAL HISTORY        Diagnosis Date    Anemia     Asthma     Cerebral artery occlusion with cerebral infarction (HCC)     CHF (congestive heart failure) (HCC)     Chronic venous hypertension (idiopathic) with ulcer of bilateral lower extremity ([facility]) 2023    Has wounds bilateral lower legs    COPD (chronic obstructive pulmonary disease) (HCC)     Diabetes (HCC)     Edema     Edema     Foot ulcer (HCC)     HTN (hypertension), benign 2013    Hyperlipidemia     PAD (peripheral artery disease) ([facility])     Pressure ulcer of right buttock, stage 3 (HCC) 2022    Thyroid disease        PAST SURGICAL HISTORY    Past Surgical History:   Procedure Laterality Date    HERNIA REPAIR      TUBAL LIGATION      VASCULAR SURGERY Left     left arterial stent       FAMILY HISTORY    Family History   Problem Relation Age of Onset    COPD Mother     Early Death Brother     Cancer Maternal Aunt     Cancer Maternal Grandmother        SOCIAL HISTORY    Social History     Tobacco Use    Smoking status: Former     Current packs/day: 0.00     Average packs/day: 1 pack/day for 25.0 years (25.0 ttl pk-yrs)     Types: Cigarettes     Start date:      Quit date: 2017     Years since quittin.5    Smokeless tobacco: Never    Tobacco comments:     quit 3 weeks ago   Vaping

## 2024-07-24 NOTE — DISCHARGE SUMMARY
V2.0  Discharge Summary    Name:  Lacey Champagne /Age/Sex: 1970 (54 y.o. female)   Admit Date: 2024  Discharge Date: 24    MRN & CSN:  [id number] & [id number] Encounter Date and Time 24 2:08 PM EDT    Attending:  Emir Ahuja MD Discharging Provider: Emir Ahuja MD       Hospital Course:     Brief HPI: Lacey Champagne is a 54 y.o. female who presented with shortness of breath    Brief Problem Based Course:   COPD exacerbation: Patient presented to the hospital after feeling very anxious having accidentally taken an extra dose of her home carvedilol.  In ED patient was noted to have increased work of breathing and workup demonstrated respiratory acidosis and hypoxia.  Patient was started on steroids and BiPAP with rapid improvement of symptoms.  Pulmonology was consulted and felt patient would benefit from having noninvasive ventilation at home.  Patient was set up to have NIV, O2 concentrator, and nebulizer at home for discharge.  Acute on chronic hypoxic and hypercapnic respiratory failure: Patient baseline wears 2 L at home.  Initially required BiPAP, but was quickly able to be weaned to nasal cannula at 5 L.  Patient was weaned to 2 to 3 L NC at time of discharge.  As noted above patient was set up for NIV and home oxygen concentrator prior to discharge  Cystitis: Patient reported itching with urination.  Urine culture positive for E. coli.  Patient was treated initially with ceftriaxone and transition to oral medication for discharge      The patient expressed appropriate understanding of, and agreement with the discharge recommendations, medications, and plan.     Consults this admission:  IP CONSULT TO SPIRITUAL SERVICES  IP CONSULT TO PULMONOLOGY  IP CONSULT TO DIABETES EDUCATOR    Discharge Diagnosis:   COPD exacerbation (HCC)  Acute on chronic hypoxic and hypercapnic respiratory failure  Simple cystitis    Discharge Instruction:   Follow up appointments:   Primary care

## 2024-07-24 NOTE — PROGRESS NOTES
CLINICAL PHARMACY NOTE: MEDS TO BEDS    Total # of Prescriptions Filled: 8   The following medications were delivered to the patient:  Current Discharge Medication List        START taking these medications    Details   predniSONE (DELTASONE) 20 MG tablet Take 2 tablets by mouth daily for 3 doses  Qty: 6 tablet, Refills: 0      cefdinir (OMNICEF) 300 MG capsule Take 1 capsule by mouth 2 times daily for 5 days  Qty: 10 capsule, Refills: 0      Continuous Glucose Sensor (FREESTYLE ANNABELLE 3 SENSOR) MISC 1 Device by Does not apply route every 14 days  Qty: 2 each, Refills: 3    Associated Diagnoses: Type 2 diabetes mellitus with right diabetic foot infection (HCC)      Continuous Glucose  (FREESTYLE ANNABELLE 3 READER) JAI 1 Device by Does not apply route continuous  Qty: 1 each, Refills: 3    Associated Diagnoses: Type 2 diabetes mellitus with right diabetic foot infection (HCC)      Blood Glucose Monitoring Suppl (BLOOD GLUCOSE MONITOR SYSTEM) w/Device KIT 1 Device by Does not apply route 4 times daily (before meals and nightly) Pharmacist to identify preferred meter and strips.  Qty: 1 kit, Refills: 0    Associated Diagnoses: Type 2 diabetes mellitus with right diabetic foot infection (HCC)      blood glucose monitor strips by Other route 4 times daily (before meals and nightly) Test 4 times a day & as needed for symptoms of irregular blood glucose. Dispense sufficient amount for indicated testing frequency plus additional to accommodate PRN testing needs. Pharmacist to identify preferred brand.  Qty: 200 strip, Refills: 3    Associated Diagnoses: Type 2 diabetes mellitus with right diabetic foot infection (HCC)      Lancets 30G MISC 1 each by Does not apply route 4 times daily (before meals and nightly) Test 4 times a day & as needed for symptoms of irregular blood glucose. Dispense sufficient amount for indicated testing frequency plus additional to accommodate PRN testing needs. Pharmacist to identify

## 2024-07-24 NOTE — PROGRESS NOTES
Patient called out reporting that she felt unwell; patient reports feeling hot / flush, has a headache, and is experiencing urinary frequency. Glucose checked and noted to be 415. Message sent to MD Seymour bernardo/ change in patient condition. Orders placed by MD for BMP, increase sliding scale dose, administer 10 units of lispro now, and recheck glucose in 2 hours. MD states to leave d/c order in place at this time as patient may be able to leave later pending labs / glucose. Patient updated & reports understanding. States she does not feel ready for d/c home today.     Retail pharmacy alerted, prescriptions to be delivered later should patient discharge.

## 2024-07-24 NOTE — PROGRESS NOTES
Patient with an elevated blood glucose this evening, glucose 351. Perfect serve message sent to on call NP, Ashley Berman, making her aware. See new orders. Patient updated.

## 2024-07-24 NOTE — PROGRESS NOTES
Patient reports that her ride will be here to pick her up at approximately 2000. Discharge paperwork reviewed, IV removed and dressing applied. Patient denies further needs.     Prescriptions, DME nebulizer and NIV & portable O2 tanks at the bedside for patient to discharge home with.

## 2024-07-24 NOTE — CARE COORDINATION
Case Management Discharge Note          Date / Time of Note: 7/24/2024 11:46 AM                  Patient Name: Lacey Champagne   YOB: 1970  Diagnosis: Chronic cough [R05.3]  COPD exacerbation (HCC) [J44.1]  Acute hypoxic on chronic hypercapnic respiratory failure (HCC) [J96.01, J96.12]   Date / Time: 7/22/2024  3:53 AM    Financial:  Payor: [facility] / Plan: [facility] MEDICAID / Product Type: *No Product type* /      Pharmacy:    [facility] [id number] - [place], [place] - [address] - P [phone] - F [phone]  [address] [place] [address]  Phone: [phone] Fax: [phone]      Assistance purchasing medications?: Potential Assistance Purchasing Medications: No  Assistance provided by Case Management: None at this time    DISCHARGE Disposition: Home- No Services Needed      Home Oxygen and Respiratory Equipment:  Oxygen needed at discharge?: Yes  Home Oxygen Company: [employer] Phone: [phone]   Portable tank available for discharge?: Yes- Concentrator  Patient home with NIV, concentrator and nebulizer.  Confirmed availability and delivery with Mary from [employer].  Home delivery of new concentrator scheduled for this evening.  Tanks, NIV and nebulizer available at the bedside.    Transportation:  Transportation PLAN for discharge: family   Mode of Transport: Private Car  Time of Transport: when ready    Transport form completed: Not Indicated    IMM Completed:   Not Indicated    Additional CM Notes: Discharge order noted, returning home, independent.  Home O2 through [employer] prior to hospitalization.  Increased O2 requirement, new NIV and nebulizer during hospitalization.  [employer] providing concentrator that will go up to 10LPM.  NIV and tanks at the bedside.  Family will transport home.      The Plan for Transition of Care is related to the following treatment goals of Chronic cough [R05.3]  COPD exacerbation (HCC) [J44.1]  Acute hypoxic on chronic  hypercapnic respiratory failure (HCC) [J96.01, J96.12]    The Patient and/or patient representative Lacey and her family were provided with a choice of provider and agrees with the discharge plan Yes    Freedom of choice list was provided with basic dialogue that supports the patient's individualized plan of care/goals and shares the quality data associated with the providers. Yes    Bibi David RN  [facility]   Case Management Department  Ph: [phone]

## 2024-07-24 NOTE — PROGRESS NOTES
Pulmonary Progress Note    Date of Admission: 7/22/2024   LOS: 2 days       CC:  Chief Complaint   Patient presents with    Shortness of Breath    Hypertension    Tachycardia        Subjective:  Using NIV   Feeling better.   Planning to use at home       Assessment:     Chronic hypercapnia  Chronic hypoxemic respiratory failure with baseline 2-3 L nasal cannula  Chart history received  Obesity  Polycythemia    Plan:     This note may have been transcribed using Dragon Dictation software. Please disregard any translational errors.       Hospital Day: 2     Acute hypoxemic respiratory failure saturation less than 90% on room air  Chronic hypoxic respiratory failure baseline   COPD FEV1 48%  Obesity  Acute on chronic hypercapnic respiratory failure pH 7.27, pCO2 97  Set up with NIV and oxygen.    COPD controlled. Duoneb's .  Prednisone   Ok to d/c from pulm pov. Short term f/u to ensure using NIV / address issues.   UTI  - CTX          Future Appointments   Date Time Provider Department Center   7/29/2024  1:00 PM Narayan Ojeda MD  PULM [facility]                Data:        PHYSICAL EXAM:   Blood pressure 122/68, pulse 72, temperature 98.2 °F (36.8 °C), temperature source Oral, resp. rate 17, height 1.6 m (5' 3\"), weight 99.7 kg (219 lb 12.8 oz), SpO2 97 %.'  Body mass index is 38.94 kg/m².   Gen: No distress.    ENT:   Resp: No accessory muscle use. No crackles. No wheezes. No rhonchi.    CV: Regular rate. Regular rhythm. No murmur or rub. No edema.   Skin: Warm, dry, normal texture and turgor. No nodule on exposed extremities.   M/S: No cyanosis. No clubbing. No joint deformity.  Psych: Oriented x 3. No anxiety.  Awake. Alert. Intact judgement and insight. Good Mood / Affect.  Memory appears in tact       Medications:    Scheduled Meds:   cefTRIAXone (ROCEPHIN) IV  1,000 mg IntraVENous Q24H    insulin lispro  4 Units SubCUTAneous TID WC    sodium chloride flush  5-40 mL IntraVENous 2 times per day    enoxaparin

## 2024-07-24 NOTE — PROGRESS NOTES
This RN spoke face to face w/ GUILLERMO Mares regarding discharge need: DME for bipap & nebulizer, home O2, and CGM w/ [employer]. Suzan states she will pass this information along to GUILLERMO Mtz to arrange for d/c.

## 2024-07-25 NOTE — PROGRESS NOTES
Pt discharged to home with daughter on 5L O2 via NC through private vehicle. AVS, meds, portable O2 given. No further questions upon time of discharge.

## 2025-03-02 ENCOUNTER — HOSPITAL ENCOUNTER (EMERGENCY)
Age: 55
Discharge: HOME | End: 2025-03-02
Attending: EMERGENCY MEDICINE
Payer: COMMERCIAL

## 2025-03-02 ENCOUNTER — APPOINTMENT (OUTPATIENT)
Dept: GENERAL RADIOLOGY | Age: 55
End: 2025-03-02
Payer: COMMERCIAL

## 2025-03-02 VITALS
DIASTOLIC BLOOD PRESSURE: 71 MMHG | OXYGEN SATURATION: 95 % | HEIGHT: 63 IN | RESPIRATION RATE: 20 BRPM | WEIGHT: 229.94 LBS | SYSTOLIC BLOOD PRESSURE: 158 MMHG | HEART RATE: 94 BPM | TEMPERATURE: 98.4 F | BODY MASS INDEX: 40.74 KG/M2

## 2025-03-02 DIAGNOSIS — J44.1 COPD EXACERBATION (HCC): ICD-10-CM

## 2025-03-02 DIAGNOSIS — J10.1 INFLUENZA A: Primary | ICD-10-CM

## 2025-03-02 LAB
ALBUMIN SERPL-MCNC: 3.5 G/DL (ref 3.4–5)
ALBUMIN/GLOB SERPL: 0.9 {RATIO} (ref 1.1–2.2)
ALP SERPL-CCNC: 77 U/L (ref 40–129)
ALT SERPL-CCNC: 21 U/L (ref 10–40)
ANION GAP SERPL CALCULATED.3IONS-SCNC: 15 MMOL/L (ref 3–16)
AST SERPL-CCNC: 30 U/L (ref 15–37)
BASE EXCESS BLDV CALC-SCNC: 10.5 MMOL/L
BASOPHILS # BLD: 0 K/UL (ref 0–0.2)
BASOPHILS NFR BLD: 0 %
BILIRUB SERPL-MCNC: 0.7 MG/DL (ref 0–1)
BUN SERPL-MCNC: 24 MG/DL (ref 7–20)
CALCIUM SERPL-MCNC: 9.2 MG/DL (ref 8.3–10.6)
CHLORIDE SERPL-SCNC: 88 MMOL/L (ref 99–110)
CO2 BLDV-SCNC: 40 MMOL/L
CO2 SERPL-SCNC: 29 MMOL/L (ref 21–32)
COHGB MFR BLDV: 1.6 %
CREAT SERPL-MCNC: 0.9 MG/DL (ref 0.6–1.1)
DEPRECATED RDW RBC AUTO: 13 % (ref 12.4–15.4)
EKG ATRIAL RATE: 97 BPM
EKG DIAGNOSIS: NORMAL
EKG P AXIS: 66 DEGREES
EKG P-R INTERVAL: 158 MS
EKG Q-T INTERVAL: 368 MS
EKG QRS DURATION: 98 MS
EKG QTC CALCULATION (BAZETT): 467 MS
EKG R AXIS: -24 DEGREES
EKG T AXIS: 45 DEGREES
EKG VENTRICULAR RATE: 97 BPM
EOSINOPHIL # BLD: 0.1 K/UL (ref 0–0.6)
EOSINOPHIL NFR BLD: 2.1 %
FLUAV + FLUBV AG NOSE IA.RAPID: DETECTED
FLUAV + FLUBV AG NOSE IA.RAPID: NOT DETECTED
GFR SERPLBLD CREATININE-BSD FMLA CKD-EPI: 75 ML/MIN/{1.73_M2}
GLUCOSE SERPL-MCNC: 278 MG/DL (ref 70–99)
HCO3 BLDV-SCNC: 38 MMOL/L (ref 23–29)
HCT VFR BLD AUTO: 37.3 % (ref 36–48)
HGB BLD-MCNC: 12.2 G/DL (ref 12–16)
LACTATE BLDV-SCNC: 1.1 MMOL/L (ref 0.4–1.9)
LYMPHOCYTES # BLD: 0.6 K/UL (ref 1–5.1)
LYMPHOCYTES NFR BLD: 11.3 %
MCH RBC QN AUTO: 28.7 PG (ref 26–34)
MCHC RBC AUTO-ENTMCNC: 32.7 G/DL (ref 31–36)
MCV RBC AUTO: 87.7 FL (ref 80–100)
METHGB MFR BLDV: 0.7 %
MONOCYTES # BLD: 0.4 K/UL (ref 0–1.3)
MONOCYTES NFR BLD: 8.4 %
NEUTROPHILS # BLD: 3.9 K/UL (ref 1.7–7.7)
NEUTROPHILS NFR BLD: 78.2 %
NT-PROBNP SERPL-MCNC: 42 PG/ML (ref 0–124)
O2 THERAPY: ABNORMAL
PCO2 BLDV: 65 MMHG (ref 40–50)
PH BLDV: 7.38 [PH] (ref 7.35–7.45)
PLATELET # BLD AUTO: 95 K/UL (ref 135–450)
PLATELET BLD QL SMEAR: ABNORMAL
PMV BLD AUTO: 7 FL (ref 5–10.5)
PO2 BLDV: <30 MMHG
POTASSIUM SERPL-SCNC: 4.6 MMOL/L (ref 3.5–5.1)
PROT SERPL-MCNC: 7.2 G/DL (ref 6.4–8.2)
RBC # BLD AUTO: 4.25 M/UL (ref 4–5.2)
RBC MORPH BLD: NORMAL
SAO2 % BLDV: 56 %
SARS-COV-2 RDRP RESP QL NAA+PROBE: NOT DETECTED
SLIDE REVIEW: ABNORMAL
SODIUM SERPL-SCNC: 132 MMOL/L (ref 136–145)
TROPONIN, HIGH SENSITIVITY: 7 NG/L (ref 0–14)
TROPONIN, HIGH SENSITIVITY: 7 NG/L (ref 0–14)
WBC # BLD AUTO: 5 K/UL (ref 4–11)

## 2025-03-02 PROCEDURE — 94640 AIRWAY INHALATION TREATMENT: CPT

## 2025-03-02 PROCEDURE — 93005 ELECTROCARDIOGRAM TRACING: CPT | Performed by: PHYSICIAN ASSISTANT

## 2025-03-02 PROCEDURE — 93010 ELECTROCARDIOGRAM REPORT: CPT | Performed by: INTERNAL MEDICINE

## 2025-03-02 PROCEDURE — 6370000000 HC RX 637 (ALT 250 FOR IP): Performed by: PHYSICIAN ASSISTANT

## 2025-03-02 PROCEDURE — 80053 COMPREHEN METABOLIC PANEL: CPT

## 2025-03-02 PROCEDURE — 85025 COMPLETE CBC W/AUTO DIFF WBC: CPT

## 2025-03-02 PROCEDURE — 6360000002 HC RX W HCPCS: Performed by: EMERGENCY MEDICINE

## 2025-03-02 PROCEDURE — 84484 ASSAY OF TROPONIN QUANT: CPT

## 2025-03-02 PROCEDURE — 71045 X-RAY EXAM CHEST 1 VIEW: CPT

## 2025-03-02 PROCEDURE — 83880 ASSAY OF NATRIURETIC PEPTIDE: CPT

## 2025-03-02 PROCEDURE — 2700000000 HC OXYGEN THERAPY PER DAY

## 2025-03-02 PROCEDURE — 6360000002 HC RX W HCPCS: Performed by: PHYSICIAN ASSISTANT

## 2025-03-02 PROCEDURE — 82803 BLOOD GASES ANY COMBINATION: CPT

## 2025-03-02 PROCEDURE — 87502 INFLUENZA DNA AMP PROBE: CPT

## 2025-03-02 PROCEDURE — 83605 ASSAY OF LACTIC ACID: CPT

## 2025-03-02 PROCEDURE — 94761 N-INVAS EAR/PLS OXIMETRY MLT: CPT

## 2025-03-02 PROCEDURE — 96375 TX/PRO/DX INJ NEW DRUG ADDON: CPT

## 2025-03-02 PROCEDURE — 99285 EMERGENCY DEPT VISIT HI MDM: CPT

## 2025-03-02 PROCEDURE — 96374 THER/PROPH/DIAG INJ IV PUSH: CPT

## 2025-03-02 PROCEDURE — 87635 SARS-COV-2 COVID-19 AMP PRB: CPT

## 2025-03-02 RX ORDER — GUAIFENESIN/DEXTROMETHORPHAN 100-10MG/5
5 SYRUP ORAL 3 TIMES DAILY PRN
Qty: 120 ML | Refills: 0 | Status: SHIPPED | OUTPATIENT
Start: 2025-03-02 | End: 2025-03-12

## 2025-03-02 RX ORDER — ONDANSETRON 2 MG/ML
4 INJECTION INTRAMUSCULAR; INTRAVENOUS ONCE
Status: COMPLETED | OUTPATIENT
Start: 2025-03-02 | End: 2025-03-02

## 2025-03-02 RX ORDER — PROMETHAZINE HYDROCHLORIDE 25 MG/1
25 TABLET ORAL 4 TIMES DAILY PRN
Qty: 20 TABLET | Refills: 0 | Status: SHIPPED | OUTPATIENT
Start: 2025-03-02 | End: 2025-03-09

## 2025-03-02 RX ORDER — IPRATROPIUM BROMIDE AND ALBUTEROL SULFATE 2.5; .5 MG/3ML; MG/3ML
1 SOLUTION RESPIRATORY (INHALATION) ONCE
Status: COMPLETED | OUTPATIENT
Start: 2025-03-02 | End: 2025-03-02

## 2025-03-02 RX ORDER — ACETAMINOPHEN 500 MG
1000 TABLET ORAL ONCE
Status: DISCONTINUED | OUTPATIENT
Start: 2025-03-02 | End: 2025-03-02

## 2025-03-02 RX ORDER — ACETAMINOPHEN 325 MG/1
650 TABLET ORAL ONCE
Status: COMPLETED | OUTPATIENT
Start: 2025-03-02 | End: 2025-03-02

## 2025-03-02 RX ORDER — DEXAMETHASONE SODIUM PHOSPHATE 4 MG/ML
8 INJECTION, SOLUTION INTRA-ARTICULAR; INTRALESIONAL; INTRAMUSCULAR; INTRAVENOUS; SOFT TISSUE ONCE
Status: COMPLETED | OUTPATIENT
Start: 2025-03-02 | End: 2025-03-02

## 2025-03-02 RX ORDER — PREDNISONE 10 MG/1
60 TABLET ORAL DAILY
Qty: 30 TABLET | Refills: 0 | Status: SHIPPED | OUTPATIENT
Start: 2025-03-02 | End: 2025-03-07

## 2025-03-02 RX ORDER — AZITHROMYCIN 250 MG/1
TABLET, FILM COATED ORAL
Qty: 6 TABLET | Refills: 0 | Status: SHIPPED | OUTPATIENT
Start: 2025-03-02 | End: 2025-03-12

## 2025-03-02 RX ORDER — HYDROCODONE BITARTRATE AND ACETAMINOPHEN 5; 325 MG/1; MG/1
1 TABLET ORAL ONCE
Status: COMPLETED | OUTPATIENT
Start: 2025-03-02 | End: 2025-03-02

## 2025-03-02 RX ADMIN — DEXAMETHASONE SODIUM PHOSPHATE 8 MG: 4 INJECTION INTRA-ARTICULAR; INTRALESIONAL; INTRAMUSCULAR; INTRAVENOUS; SOFT TISSUE at 03:10

## 2025-03-02 RX ADMIN — HYDROCODONE BITARTRATE AND ACETAMINOPHEN 1 TABLET: 5; 325 TABLET ORAL at 04:34

## 2025-03-02 RX ADMIN — ONDANSETRON 4 MG: 2 INJECTION, SOLUTION INTRAMUSCULAR; INTRAVENOUS at 04:35

## 2025-03-02 RX ADMIN — IPRATROPIUM BROMIDE AND ALBUTEROL SULFATE 1 DOSE: .5; 2.5 SOLUTION RESPIRATORY (INHALATION) at 02:35

## 2025-03-02 RX ADMIN — ACETAMINOPHEN 650 MG: 325 TABLET ORAL at 04:34

## 2025-03-02 ASSESSMENT — PAIN DESCRIPTION - DESCRIPTORS: DESCRIPTORS: PATIENT UNABLE TO DESCRIBE

## 2025-03-02 ASSESSMENT — PAIN - FUNCTIONAL ASSESSMENT: PAIN_FUNCTIONAL_ASSESSMENT: PREVENTS OR INTERFERES SOME ACTIVE ACTIVITIES AND ADLS

## 2025-03-02 ASSESSMENT — PAIN DESCRIPTION - LOCATION: LOCATION: BACK

## 2025-03-02 ASSESSMENT — PAIN SCALES - GENERAL: PAINLEVEL_OUTOF10: 8

## 2025-03-02 NOTE — ED NOTES
Pt requested something to eat d/t her not eating for over a week. Pt was given a turkey sandwich with water.

## 2025-03-02 NOTE — ED NOTES
D/C: Order noted for d/c. Pt confirmed d/c paperwork has correct name. Discharge and education instructions reviewed with patient. Teach-back successful.  Pt verbalized understanding and denied questions at this time. No acute distress noted. Patient instructed to follow-up as noted - return to emergency department if symptoms worsen. Patient verbalized understanding. Discharged per EDMD with discharge instructions. Pt discharged to private vehicle. Patient stable upon departure. Thanked patient for [facility] for care. Provider aware of patient pain at time of discharge.     Pt refused repeat vital signs prior to discharging. MD made aware.

## 2025-03-02 NOTE — ED NOTES
Pt is d/c'd. Pt was removed from the monitor and BP, and pulse oz. Pt is wanting to sit in the chair until she can find a ride.

## 2025-03-04 NOTE — ED PROVIDER NOTES
the emergency department for new or worsening symptoms.    EKG demonstrates sinus rhythm ventricular 97 bpm.  AR interval and QTc interval within normal limits.  Has a normal axis.  There are no significant ST elevations or depressions EKG is nondiagnostic for ACS as interpreted by me..  Compared to EKG from 7/22/2024 did not appreciate significant change.     Do not appreciate any acute cardiopulmonary disease.  No obvious abnormality osseous structures as interpreted by me.    Disposition:  1. Influenza A    2. COPD exacerbation (HCC)          Viraj Matthews MD  Attending Emergency Physician        Viraj Matthews MD  03/03/25 2181    
patient tolerated their visit well.  The patient and / or the family were informed of the results of any tests, a time was given to answer questions, a plan was proposed and they agreed with plan.    I am the Primary Clinician of Record.  FINAL IMPRESSION      1. Influenza A    2. COPD exacerbation (HCC)          DISPOSITION/PLAN     DISPOSITION Decision To Discharge 03/02/2025 04:30:48 AM   DISPOSITION CONDITION Stable           PATIENT REFERRED TO:  [facility]  [address] [place] [id number]    Schedule an appointment as soon as possible for a visit in 1 day  for reevaluation    [facility] Emergency Department  [address] [facility] [id number] [phone]  Go to   As needed, If symptoms worsen      DISCHARGE MEDICATIONS:  Discharge Medication List as of 3/2/2025  7:51 AM        START taking these medications    Details   promethazine (PHENERGAN) 25 MG tablet Take 1 tablet by mouth 4 times daily as needed for Nausea, Disp-20 tablet, R-0Normal      guaiFENesin-dextromethorphan (ROBITUSSIN DM) 100-10 MG/5ML syrup Take 5 mLs by mouth 3 times daily as needed for Cough, Disp-120 mL, R-0Normal      benzocaine-Menthol (CEPACOL INSTAMAX) 15-20 MG lozenge Take 1 lozenge by mouth every 2 hours as needed for Sore Throat, Disp-16 lozenge, R-0Normal      predniSONE (DELTASONE) 10 MG tablet Take 6 tablets by mouth daily for 5 doses, Disp-30 tablet, R-0Normal      azithromycin (ZITHROMAX) 250 MG tablet 500mg on day 1 followed by 250mg on days 2 - 5, Disp-6 tablet, R-0Normal             DISCONTINUED MEDICATIONS:  Discharge Medication List as of 3/2/2025  7:51 AM                 (Please note that portions of this note were completed with a voice recognition program.  Efforts were made to edit the dictations but occasionally words are mis-transcribed.)    Maria Del Carmen Hinton PA-C (electronically signed)       Maria Del Carmen Hinton PA-C  03/04/25 [id number]